# Patient Record
Sex: FEMALE | Race: BLACK OR AFRICAN AMERICAN | NOT HISPANIC OR LATINO | URBAN - METROPOLITAN AREA
[De-identification: names, ages, dates, MRNs, and addresses within clinical notes are randomized per-mention and may not be internally consistent; named-entity substitution may affect disease eponyms.]

---

## 2022-01-13 ENCOUNTER — EMERGENCY (EMERGENCY)
Facility: HOSPITAL | Age: 52
LOS: 1 days | Discharge: ANOTHER HOSPITAL | End: 2022-01-13
Attending: EMERGENCY MEDICINE | Admitting: EMERGENCY MEDICINE
Payer: COMMERCIAL

## 2022-01-13 ENCOUNTER — INPATIENT (INPATIENT)
Facility: HOSPITAL | Age: 52
LOS: 3 days | Discharge: HOME | DRG: 250 | End: 2022-01-17
Attending: HOSPITALIST | Admitting: INTERNAL MEDICINE
Payer: COMMERCIAL

## 2022-01-13 VITALS
HEART RATE: 89 BPM | WEIGHT: 259.93 LBS | DIASTOLIC BLOOD PRESSURE: 90 MMHG | SYSTOLIC BLOOD PRESSURE: 171 MMHG | TEMPERATURE: 98 F | HEIGHT: 64 IN | OXYGEN SATURATION: 99 % | RESPIRATION RATE: 16 BRPM

## 2022-01-13 VITALS
DIASTOLIC BLOOD PRESSURE: 110 MMHG | RESPIRATION RATE: 18 BRPM | SYSTOLIC BLOOD PRESSURE: 159 MMHG | TEMPERATURE: 98 F | HEART RATE: 87 BPM | OXYGEN SATURATION: 100 %

## 2022-01-13 VITALS
RESPIRATION RATE: 16 BRPM | DIASTOLIC BLOOD PRESSURE: 86 MMHG | HEIGHT: 66 IN | HEART RATE: 83 BPM | WEIGHT: 258.6 LBS | SYSTOLIC BLOOD PRESSURE: 138 MMHG | TEMPERATURE: 99 F

## 2022-01-13 DIAGNOSIS — I21.3 ST ELEVATION (STEMI) MYOCARDIAL INFARCTION OF UNSPECIFIED SITE: ICD-10-CM

## 2022-01-13 LAB
ALBUMIN SERPL ELPH-MCNC: 3.6 G/DL — SIGNIFICANT CHANGE UP (ref 3.3–5)
ALBUMIN SERPL ELPH-MCNC: 4.2 G/DL — SIGNIFICANT CHANGE UP (ref 3.3–5)
ALP SERPL-CCNC: 63 U/L — SIGNIFICANT CHANGE UP (ref 40–120)
ALP SERPL-CCNC: 64 U/L — SIGNIFICANT CHANGE UP (ref 40–120)
ALT FLD-CCNC: 25 U/L — SIGNIFICANT CHANGE UP (ref 12–78)
ALT FLD-CCNC: 42 U/L — SIGNIFICANT CHANGE UP (ref 10–45)
ANION GAP SERPL CALC-SCNC: 17 MMOL/L — SIGNIFICANT CHANGE UP (ref 5–17)
ANION GAP SERPL CALC-SCNC: 9 MMOL/L — SIGNIFICANT CHANGE UP (ref 5–17)
APTT BLD: 30.7 SEC — SIGNIFICANT CHANGE UP (ref 27.5–35.5)
APTT BLD: >200 SEC — CRITICAL HIGH (ref 27.5–35.5)
AST SERPL-CCNC: 220 U/L — HIGH (ref 10–40)
AST SERPL-CCNC: 34 U/L — SIGNIFICANT CHANGE UP (ref 15–37)
BASOPHILS # BLD AUTO: 0.02 K/UL — SIGNIFICANT CHANGE UP (ref 0–0.2)
BASOPHILS NFR BLD AUTO: 0.3 % — SIGNIFICANT CHANGE UP (ref 0–2)
BILIRUB SERPL-MCNC: 0.5 MG/DL — SIGNIFICANT CHANGE UP (ref 0.2–1.2)
BILIRUB SERPL-MCNC: 0.6 MG/DL — SIGNIFICANT CHANGE UP (ref 0.2–1.2)
BLD GP AB SCN SERPL QL: NEGATIVE — SIGNIFICANT CHANGE UP
BUN SERPL-MCNC: 6 MG/DL — LOW (ref 7–23)
BUN SERPL-MCNC: 6 MG/DL — LOW (ref 7–23)
CALCIUM SERPL-MCNC: 9.1 MG/DL — SIGNIFICANT CHANGE UP (ref 8.5–10.1)
CALCIUM SERPL-MCNC: 9.6 MG/DL — SIGNIFICANT CHANGE UP (ref 8.4–10.5)
CHLORIDE SERPL-SCNC: 105 MMOL/L — SIGNIFICANT CHANGE UP (ref 96–108)
CHLORIDE SERPL-SCNC: 99 MMOL/L — SIGNIFICANT CHANGE UP (ref 96–108)
CK MB BLD-MCNC: 8.8 % — HIGH (ref 0–3.5)
CK MB CFR SERPL CALC: 276.1 NG/ML — HIGH (ref 0–3.8)
CK SERPL-CCNC: 3140 U/L — HIGH (ref 25–170)
CO2 SERPL-SCNC: 20 MMOL/L — LOW (ref 22–31)
CO2 SERPL-SCNC: 26 MMOL/L — SIGNIFICANT CHANGE UP (ref 22–31)
CREAT SERPL-MCNC: 0.64 MG/DL — SIGNIFICANT CHANGE UP (ref 0.5–1.3)
CREAT SERPL-MCNC: 0.92 MG/DL — SIGNIFICANT CHANGE UP (ref 0.5–1.3)
EOSINOPHIL # BLD AUTO: 0 K/UL — SIGNIFICANT CHANGE UP (ref 0–0.5)
EOSINOPHIL NFR BLD AUTO: 0 % — SIGNIFICANT CHANGE UP (ref 0–6)
GLUCOSE SERPL-MCNC: 120 MG/DL — HIGH (ref 70–99)
GLUCOSE SERPL-MCNC: 132 MG/DL — HIGH (ref 70–99)
HCT VFR BLD CALC: 38.8 % — SIGNIFICANT CHANGE UP (ref 34.5–45)
HCT VFR BLD CALC: 39.9 % — SIGNIFICANT CHANGE UP (ref 34.5–45)
HGB BLD-MCNC: 12.3 G/DL — SIGNIFICANT CHANGE UP (ref 11.5–15.5)
HGB BLD-MCNC: 12.3 G/DL — SIGNIFICANT CHANGE UP (ref 11.5–15.5)
IMM GRANULOCYTES NFR BLD AUTO: 0.6 % — SIGNIFICANT CHANGE UP (ref 0–1.5)
INR BLD: 1.12 RATIO — SIGNIFICANT CHANGE UP (ref 0.88–1.16)
LYMPHOCYTES # BLD AUTO: 1.66 K/UL — SIGNIFICANT CHANGE UP (ref 1–3.3)
LYMPHOCYTES # BLD AUTO: 24.3 % — SIGNIFICANT CHANGE UP (ref 13–44)
MAGNESIUM SERPL-MCNC: 1.6 MG/DL — SIGNIFICANT CHANGE UP (ref 1.6–2.6)
MCHC RBC-ENTMCNC: 26.5 PG — LOW (ref 27–34)
MCHC RBC-ENTMCNC: 26.9 PG — LOW (ref 27–34)
MCHC RBC-ENTMCNC: 30.8 GM/DL — LOW (ref 32–36)
MCHC RBC-ENTMCNC: 31.7 GM/DL — LOW (ref 32–36)
MCV RBC AUTO: 84.9 FL — SIGNIFICANT CHANGE UP (ref 80–100)
MCV RBC AUTO: 86 FL — SIGNIFICANT CHANGE UP (ref 80–100)
MONOCYTES # BLD AUTO: 0.39 K/UL — SIGNIFICANT CHANGE UP (ref 0–0.9)
MONOCYTES NFR BLD AUTO: 5.7 % — SIGNIFICANT CHANGE UP (ref 2–14)
NEUTROPHILS # BLD AUTO: 4.73 K/UL — SIGNIFICANT CHANGE UP (ref 1.8–7.4)
NEUTROPHILS NFR BLD AUTO: 69.1 % — SIGNIFICANT CHANGE UP (ref 43–77)
NRBC # BLD: 0 /100 WBCS — SIGNIFICANT CHANGE UP (ref 0–0)
NRBC # BLD: 0 /100 WBCS — SIGNIFICANT CHANGE UP (ref 0–0)
PHOSPHATE SERPL-MCNC: 3.1 MG/DL — SIGNIFICANT CHANGE UP (ref 2.5–4.5)
PLATELET # BLD AUTO: 327 K/UL — SIGNIFICANT CHANGE UP (ref 150–400)
PLATELET # BLD AUTO: 362 K/UL — SIGNIFICANT CHANGE UP (ref 150–400)
POTASSIUM SERPL-MCNC: 3.7 MMOL/L — SIGNIFICANT CHANGE UP (ref 3.5–5.3)
POTASSIUM SERPL-MCNC: 3.9 MMOL/L — SIGNIFICANT CHANGE UP (ref 3.5–5.3)
POTASSIUM SERPL-SCNC: 3.7 MMOL/L — SIGNIFICANT CHANGE UP (ref 3.5–5.3)
POTASSIUM SERPL-SCNC: 3.9 MMOL/L — SIGNIFICANT CHANGE UP (ref 3.5–5.3)
PROT SERPL-MCNC: 8 G/DL — SIGNIFICANT CHANGE UP (ref 6–8.3)
PROT SERPL-MCNC: 8.6 G/DL — HIGH (ref 6–8.3)
PROTHROM AB SERPL-ACNC: 13 SEC — SIGNIFICANT CHANGE UP (ref 10.6–13.6)
RBC # BLD: 4.57 M/UL — SIGNIFICANT CHANGE UP (ref 3.8–5.2)
RBC # BLD: 4.64 M/UL — SIGNIFICANT CHANGE UP (ref 3.8–5.2)
RBC # FLD: 14.7 % — HIGH (ref 10.3–14.5)
RBC # FLD: 14.9 % — HIGH (ref 10.3–14.5)
RH IG SCN BLD-IMP: POSITIVE — SIGNIFICANT CHANGE UP
SARS-COV-2 RNA SPEC QL NAA+PROBE: DETECTED
SODIUM SERPL-SCNC: 136 MMOL/L — SIGNIFICANT CHANGE UP (ref 135–145)
SODIUM SERPL-SCNC: 140 MMOL/L — SIGNIFICANT CHANGE UP (ref 135–145)
TROPONIN I, HIGH SENSITIVITY RESULT: 1590.4 NG/L — HIGH
TROPONIN T, HIGH SENSITIVITY RESULT: 3085 NG/L — HIGH (ref 0–51)
WBC # BLD: 11.42 K/UL — HIGH (ref 3.8–10.5)
WBC # BLD: 6.84 K/UL — SIGNIFICANT CHANGE UP (ref 3.8–10.5)
WBC # FLD AUTO: 11.42 K/UL — HIGH (ref 3.8–10.5)
WBC # FLD AUTO: 6.84 K/UL — SIGNIFICANT CHANGE UP (ref 3.8–10.5)

## 2022-01-13 PROCEDURE — 87635 SARS-COV-2 COVID-19 AMP PRB: CPT

## 2022-01-13 PROCEDURE — 71045 X-RAY EXAM CHEST 1 VIEW: CPT | Mod: 26

## 2022-01-13 PROCEDURE — 99222 1ST HOSP IP/OBS MODERATE 55: CPT

## 2022-01-13 PROCEDURE — 85610 PROTHROMBIN TIME: CPT

## 2022-01-13 PROCEDURE — 99291 CRITICAL CARE FIRST HOUR: CPT

## 2022-01-13 PROCEDURE — 84484 ASSAY OF TROPONIN QUANT: CPT

## 2022-01-13 PROCEDURE — 92941 PRQ TRLML REVSC TOT OCCL AMI: CPT | Mod: LC

## 2022-01-13 PROCEDURE — 82553 CREATINE MB FRACTION: CPT

## 2022-01-13 PROCEDURE — 99285 EMERGENCY DEPT VISIT HI MDM: CPT

## 2022-01-13 PROCEDURE — 99152 MOD SED SAME PHYS/QHP 5/>YRS: CPT

## 2022-01-13 PROCEDURE — 93454 CORONARY ARTERY ANGIO S&I: CPT | Mod: 26,59

## 2022-01-13 PROCEDURE — 71045 X-RAY EXAM CHEST 1 VIEW: CPT

## 2022-01-13 PROCEDURE — 85730 THROMBOPLASTIN TIME PARTIAL: CPT

## 2022-01-13 PROCEDURE — 36415 COLL VENOUS BLD VENIPUNCTURE: CPT

## 2022-01-13 PROCEDURE — 93010 ELECTROCARDIOGRAM REPORT: CPT

## 2022-01-13 PROCEDURE — 93005 ELECTROCARDIOGRAM TRACING: CPT

## 2022-01-13 PROCEDURE — 96374 THER/PROPH/DIAG INJ IV PUSH: CPT

## 2022-01-13 PROCEDURE — 96375 TX/PRO/DX INJ NEW DRUG ADDON: CPT

## 2022-01-13 PROCEDURE — 99285 EMERGENCY DEPT VISIT HI MDM: CPT | Mod: 25

## 2022-01-13 PROCEDURE — 85025 COMPLETE CBC W/AUTO DIFF WBC: CPT

## 2022-01-13 PROCEDURE — 82550 ASSAY OF CK (CPK): CPT

## 2022-01-13 PROCEDURE — 80053 COMPREHEN METABOLIC PANEL: CPT

## 2022-01-13 RX ORDER — HEPARIN SODIUM 5000 [USP'U]/ML
6000 INJECTION INTRAVENOUS; SUBCUTANEOUS EVERY 6 HOURS
Refills: 0 | Status: DISCONTINUED | OUTPATIENT
Start: 2022-01-13 | End: 2022-01-16

## 2022-01-13 RX ORDER — TICAGRELOR 90 MG/1
90 TABLET ORAL EVERY 12 HOURS
Refills: 0 | Status: DISCONTINUED | OUTPATIENT
Start: 2022-01-14 | End: 2022-01-14

## 2022-01-13 RX ORDER — INFLUENZA VIRUS VACCINE 15; 15; 15; 15 UG/.5ML; UG/.5ML; UG/.5ML; UG/.5ML
0.5 SUSPENSION INTRAMUSCULAR ONCE
Refills: 0 | Status: DISCONTINUED | OUTPATIENT
Start: 2022-01-13 | End: 2022-01-17

## 2022-01-13 RX ORDER — MAGNESIUM SULFATE 500 MG/ML
2 VIAL (ML) INJECTION ONCE
Refills: 0 | Status: COMPLETED | OUTPATIENT
Start: 2022-01-13 | End: 2022-01-13

## 2022-01-13 RX ORDER — NITROGLYCERIN 6.5 MG
0.4 CAPSULE, EXTENDED RELEASE ORAL ONCE
Refills: 0 | Status: COMPLETED | OUTPATIENT
Start: 2022-01-13 | End: 2022-01-13

## 2022-01-13 RX ORDER — EPTIFIBATIDE 2 MG/ML
2.02 INJECTION, SOLUTION INTRAVENOUS
Qty: 75 | Refills: 0 | Status: DISCONTINUED | OUTPATIENT
Start: 2022-01-13 | End: 2022-01-14

## 2022-01-13 RX ORDER — ATORVASTATIN CALCIUM 80 MG/1
80 TABLET, FILM COATED ORAL AT BEDTIME
Refills: 0 | Status: DISCONTINUED | OUTPATIENT
Start: 2022-01-13 | End: 2022-01-14

## 2022-01-13 RX ORDER — ASPIRIN/CALCIUM CARB/MAGNESIUM 324 MG
81 TABLET ORAL ONCE
Refills: 0 | Status: COMPLETED | OUTPATIENT
Start: 2022-01-13 | End: 2022-01-13

## 2022-01-13 RX ORDER — POTASSIUM CHLORIDE 20 MEQ
10 PACKET (EA) ORAL ONCE
Refills: 0 | Status: DISCONTINUED | OUTPATIENT
Start: 2022-01-13 | End: 2022-01-13

## 2022-01-13 RX ORDER — HEPARIN SODIUM 5000 [USP'U]/ML
5000 INJECTION INTRAVENOUS; SUBCUTANEOUS ONCE
Refills: 0 | Status: COMPLETED | OUTPATIENT
Start: 2022-01-13 | End: 2022-01-13

## 2022-01-13 RX ORDER — CHLORHEXIDINE GLUCONATE 213 G/1000ML
1 SOLUTION TOPICAL
Refills: 0 | Status: DISCONTINUED | OUTPATIENT
Start: 2022-01-13 | End: 2022-01-16

## 2022-01-13 RX ORDER — LEVOTHYROXINE SODIUM 125 MCG
150 TABLET ORAL DAILY
Refills: 0 | Status: DISCONTINUED | OUTPATIENT
Start: 2022-01-13 | End: 2022-01-17

## 2022-01-13 RX ORDER — LEVOTHYROXINE SODIUM 125 MCG
150 TABLET ORAL DAILY
Refills: 0 | Status: DISCONTINUED | OUTPATIENT
Start: 2022-01-13 | End: 2022-01-16

## 2022-01-13 RX ORDER — ONDANSETRON 8 MG/1
4 TABLET, FILM COATED ORAL ONCE
Refills: 0 | Status: COMPLETED | OUTPATIENT
Start: 2022-01-13 | End: 2022-01-13

## 2022-01-13 RX ORDER — HEPARIN SODIUM 5000 [USP'U]/ML
5000 INJECTION INTRAVENOUS; SUBCUTANEOUS EVERY 8 HOURS
Refills: 0 | Status: DISCONTINUED | OUTPATIENT
Start: 2022-01-14 | End: 2022-01-17

## 2022-01-13 RX ORDER — TICAGRELOR 90 MG/1
180 TABLET ORAL ONCE
Refills: 0 | Status: COMPLETED | OUTPATIENT
Start: 2022-01-13 | End: 2022-01-13

## 2022-01-13 RX ORDER — MORPHINE SULFATE 50 MG/1
2 CAPSULE, EXTENDED RELEASE ORAL ONCE
Refills: 0 | Status: DISCONTINUED | OUTPATIENT
Start: 2022-01-13 | End: 2022-01-13

## 2022-01-13 RX ORDER — LIDOCAINE 4 G/100G
1 CREAM TOPICAL EVERY 24 HOURS
Refills: 0 | Status: DISCONTINUED | OUTPATIENT
Start: 2022-01-13 | End: 2022-01-17

## 2022-01-13 RX ORDER — ASPIRIN/CALCIUM CARB/MAGNESIUM 324 MG
81 TABLET ORAL DAILY
Refills: 0 | Status: DISCONTINUED | OUTPATIENT
Start: 2022-01-14 | End: 2022-01-14

## 2022-01-13 RX ORDER — ONDANSETRON 8 MG/1
4 TABLET, FILM COATED ORAL ONCE
Refills: 0 | Status: DISCONTINUED | OUTPATIENT
Start: 2022-01-13 | End: 2022-01-13

## 2022-01-13 RX ORDER — POTASSIUM CHLORIDE 20 MEQ
40 PACKET (EA) ORAL ONCE
Refills: 0 | Status: DISCONTINUED | OUTPATIENT
Start: 2022-01-13 | End: 2022-01-13

## 2022-01-13 RX ORDER — HEPARIN SODIUM 5000 [USP'U]/ML
INJECTION INTRAVENOUS; SUBCUTANEOUS
Qty: 25000 | Refills: 0 | Status: DISCONTINUED | OUTPATIENT
Start: 2022-01-13 | End: 2022-01-16

## 2022-01-13 RX ORDER — POTASSIUM CHLORIDE 20 MEQ
20 PACKET (EA) ORAL ONCE
Refills: 0 | Status: COMPLETED | OUTPATIENT
Start: 2022-01-13 | End: 2022-01-13

## 2022-01-13 RX ADMIN — Medication 25 GRAM(S): at 18:33

## 2022-01-13 RX ADMIN — EPTIFIBATIDE 19 MICROGRAM(S)/KG/MIN: 2 INJECTION, SOLUTION INTRAVENOUS at 21:34

## 2022-01-13 RX ADMIN — TICAGRELOR 180 MILLIGRAM(S): 90 TABLET ORAL at 13:28

## 2022-01-13 RX ADMIN — Medication 0.4 MILLIGRAM(S): at 11:53

## 2022-01-13 RX ADMIN — MORPHINE SULFATE 2 MILLIGRAM(S): 50 CAPSULE, EXTENDED RELEASE ORAL at 13:27

## 2022-01-13 RX ADMIN — MORPHINE SULFATE 2 MILLIGRAM(S): 50 CAPSULE, EXTENDED RELEASE ORAL at 13:42

## 2022-01-13 RX ADMIN — Medication 150 MICROGRAM(S): at 23:19

## 2022-01-13 RX ADMIN — Medication 81 MILLIGRAM(S): at 11:53

## 2022-01-13 RX ADMIN — ONDANSETRON 4 MILLIGRAM(S): 8 TABLET, FILM COATED ORAL at 17:15

## 2022-01-13 RX ADMIN — EPTIFIBATIDE 19 MICROGRAM(S)/KG/MIN: 2 INJECTION, SOLUTION INTRAVENOUS at 17:16

## 2022-01-13 RX ADMIN — HEPARIN SODIUM 5000 UNIT(S): 5000 INJECTION INTRAVENOUS; SUBCUTANEOUS at 13:27

## 2022-01-13 RX ADMIN — LIDOCAINE 1 PATCH: 4 CREAM TOPICAL at 23:46

## 2022-01-13 RX ADMIN — Medication 20 MILLIEQUIVALENT(S): at 23:19

## 2022-01-13 RX ADMIN — HEPARIN SODIUM 1000 UNIT(S)/HR: 5000 INJECTION INTRAVENOUS; SUBCUTANEOUS at 13:28

## 2022-01-13 RX ADMIN — ATORVASTATIN CALCIUM 80 MILLIGRAM(S): 80 TABLET, FILM COATED ORAL at 21:35

## 2022-01-13 NOTE — ED ADULT NURSE NOTE - OBJECTIVE STATEMENT
Pt. received alert and oriented x3 with chief complaint of sudden onset of chest pain when waking up this morning w/ diaphoresis. Pt. placed on continuous cardiac monitor with continuous pulse ox. EKG performed at bedside upon arrival.

## 2022-01-13 NOTE — H&P ADULT - NSHPLABSRESULTS_GEN_ALL_CORE
LABS:      The Labs were reviewed by me   The Radiology was reviewed by me    EKG tracing reviewed by me    01-13    140  |  105  |  6<L>  ----------------------------<  120<H>  3.9   |  26  |  0.92    Ca    9.1      13 Jan 2022 12:06    TPro  8.6<H>  /  Alb  3.6  /  TBili  0.5  /  DBili  x   /  AST  34  /  ALT  25  /  AlkPhos  63  01-13          PT/INR - ( 13 Jan 2022 13:12 )   PT: 13.0 sec;   INR: 1.12 ratio         PTT - ( 13 Jan 2022 13:12 )  PTT:30.7 sec                                        12.3   6.84  )-----------( 327      ( 13 Jan 2022 12:06 )             39.9     CAPILLARY BLOOD GLUCOSE            IMAGING: LABS:      The Labs were reviewed by me   The Radiology was reviewed by me    EKG tracing reviewed by me    01-13    140  |  105  |  6<L>  ----------------------------<  120<H>  3.9   |  26  |  0.92    Ca    9.1      13 Jan 2022 12:06    TPro  8.6<H>  /  Alb  3.6  /  TBili  0.5  /  DBili  x   /  AST  34  /  ALT  25  /  AlkPhos  63  01-13          PT/INR - ( 13 Jan 2022 13:12 )   PT: 13.0 sec;   INR: 1.12 ratio         PTT - ( 13 Jan 2022 13:12 )  PTT:30.7 sec                                        12.3   6.84  )-----------( 327      ( 13 Jan 2022 12:06 )             39.9     CAPILLARY BLOOD GLUCOSE

## 2022-01-13 NOTE — CONSULT NOTE ADULT - ATTENDING COMMENTS
Reviewed    Patient seen and examined    Agree with plan as outlined above    51 year old female with PMH of hypothyroidism presents today with chest discomfort. Cardio consulted. Her EKG is showing Sinus rhythm with fusion complexes, ST depressions in V1-V3, TWI in I and AVl and her 1st set of troponin was elevated to 1500. She was also incidentally covid positive.  No evidence of volume overload on exam. Her BP is elevated here which is likely acute on chronic. Her EKG and positive CE are suggestive of NSTEMI.    Recommendations    - STAT ASA, brillinta, heparin gtt  - consider 2mg IVP morphine for chest discomfort  - Repeat EKG now  - Repeat full set CE now and trend q4 hrs  - For transfer to [facility] for cardiac cath  - Conservative management for COVID. Consider ID consult  - monitor and replete lytes, keep K>4, Mg>2  - Other cardiovascular workup will depend on clinical course.  - All other workup per primary team  - Will follow

## 2022-01-13 NOTE — H&P ADULT - NSHPPHYSICALEXAM_GEN_ALL_CORE
T(C): 36.7 (01-13-22 @ 13:59), Max: 36.7 (01-13-22 @ 11:17)  HR: 87 (01-13-22 @ 13:59) (72 - 89)  BP: 159/110 (01-13-22 @ 13:59) (159/110 - 171/90)  RR: 18 (01-13-22 @ 13:59) (16 - 18)  SpO2: 100% (01-13-22 @ 13:59) (97% - 100%)  Wt(kg): --Vital Signs Last 24 Hrs  T(C): 36.7 (13 Jan 2022 13:59), Max: 36.7 (13 Jan 2022 11:17)  T(F): 98 (13 Jan 2022 13:59), Max: 98 (13 Jan 2022 11:17)  HR: 87 (13 Jan 2022 13:59) (72 - 89)  BP: 159/110 (13 Jan 2022 13:59) (159/110 - 171/90)  BP(mean): --  RR: 18 (13 Jan 2022 13:59) (16 - 18)  SpO2: 100% (13 Jan 2022 13:59) (97% - 100%)    PHYSICAL EXAM:  Constitutional: NAD, obese  HEENT NC/AT, moist mucous membranes  Pulmonary: Non-labored, breath sounds are clear bilaterally, no wheezing, rales or rhonchi  Cardiovascular: +S1, S2, RRR, no murmur  Gastrointestinal: Soft, nontender, nondistended, normoactive bowel sounds  Extremities: No peripheral edema   Neurological: Alert, strength and sensitivity are grossly intact  Skin: No obvious lesions/rashes  Psych: Mood & affect appropriate    Consultant(s) Notes Reviewed:  [x ] YES  [ ] NO  Care Discussed with Consultants/Other Providers [ x] YES  [ ] NO

## 2022-01-13 NOTE — H&P ADULT - ATTENDING COMMENTS
50 yo woman with HTN not on meds, hypothyroidism, fibroids, who presented with ACS to OSH s/p PCI to the     Continue Integrilin drip, check CBC for plts   DAPT  check TTE  trend CE   check TSH, lipids and HbA1C

## 2022-01-13 NOTE — PROGRESS NOTE ADULT - SUBJECTIVE AND OBJECTIVE BOX
YU RED  MRN-[id number]  Patient is a 51y old  Female who presents with a chief complaint of NSTEMI (13 Jan 2022 15:11)    HPI:  HPI: 51 year old female with PMH of hypothyroidism presents today with chest discomfort, which started at 7:30am. This morning patient woke up feeling very diaphoretic with burning sensation in throat. She then developed substernal chest pressure, 5/10 chest pain, and nausea. Denies dyspnea, PND, orthopnea, lower extremity swelling. She was brought to ED for evaluation. Prior to today. She has never had chest pain. Never had ischemic workup. She recently established care with Cardiologist Dr. Amador Gupta for HTN. She is a former smoker of 15 years.     ED Course:   VS: Afebrile, /90, HR 89, RR 16, SpO2 99% on RA  Labs: trop I 1590, , CKMB 22, COVID+, CXR possible interstitial infiltrates  hep gtt, ticagrelor 180 mg x 1   (13 Jan 2022 15:11)      Hospital Course:  1/13 Transferred to CCU for further management     24 HOUR EVENTS:    REVIEW OF SYSTEMS:  CONSTITUTIONAL: No fever, weight loss, or fatigue, (+) diaphoresis  EYES: No eye pain, visual disturbances, or discharge  ENT:  No difficulty hearing, tinnitus, vertigo; No sinus or throat pain  NECK: No pain or stiffness  BREASTS: No pain, masses, or nipple discharge  RESPIRATORY: No cough, wheezing, chills or hemoptysis; No shortness of breath  CARDIOVASCULAR: (+) chest pain. No palpitations, dizziness, or leg swelling  GASTROINTESTINAL: No abdominal or epigastric pain. (+) nausea. No vomiting, or hematemesis; No diarrhea or constipation. No melena or hematochezia.  GENITOURINARY: No dysuria, frequency, hematuria, or incontinence  NEUROLOGICAL: No headaches, memory loss, loss of strength, numbness, or tremors  SKIN: No itching, burning, rashes, or lesions   LYMPH NODES: No enlarged glands  ENDOCRINE: No heat or cold intolerance; No hair loss  MUSCULOSKELETAL: No joint pain or swelling; No muscle, back, or extremity pain  PSYCHIATRIC: No depression, anxiety, mood swings, or difficulty sleeping  HEME/LYMPH: No easy bruising, or bleeding gums  ALLERGY AND IMMUNOLOGIC: No hives or eczema      ICU Vital Signs Last 24 Hrs  T(C): 37.1 (13 Jan 2022 16:30), Max: 37.1 (13 Jan 2022 16:30)  T(F): 98.7 (13 Jan 2022 16:30), Max: 98.7 (13 Jan 2022 16:30)  HR: 75 (13 Jan 2022 18:30) (72 - 97)  BP: 100/65 (13 Jan 2022 18:30) (100/64 - 171/90)  BP(mean): 78 (13 Jan 2022 18:30) (74 - 106)  ABP: --  ABP(mean): --  RR: 20 (13 Jan 2022 18:30) (15 - 23)  SpO2: 100% (13 Jan 2022 18:30) (97% - 100%)      CVP(mm Hg): --  CO: --  CI: --  PA: --  PA(mean): --  PA(direct): --  PCWP: --  LA: --  RA: --  SVR: --  SVRI: --  PVR: --  PVRI: --  I&O's Summary    13 Jan 2022 07:01  -  13 Jan 2022 19:49  --------------------------------------------------------  IN: 366 mL / OUT: 400 mL / NET: -34 mL        CAPILLARY BLOOD GLUCOSE    CAPILLARY BLOOD GLUCOSE          PHYSICAL EXAM:  Constitutional: NAD, obese  HEENT NC/AT, moist mucous membranes  Pulmonary: Non-labored, breath sounds are clear bilaterally, no wheezing, rales or rhonchi  Cardiovascular: +S1, S2, RRR, no murmur  Gastrointestinal: Soft, nontender, nondistended, normoactive bowel sounds  Extremities: No peripheral edema   Neurological: Alert, strength and sensitivity are grossly intact  Skin: No obvious lesions/rashes  Psych: Mood & affect appropriate      ============================I/O===========================   I&O's Detail    13 Jan 2022 07:01  -  13 Jan 2022 19:49  --------------------------------------------------------  IN:    Eptifbatide: 76 mL    IV PiggyBack: 50 mL    Oral Fluid: 240 mL  Total IN: 366 mL    OUT:    Voided (mL): 400 mL  Total OUT: 400 mL    Total NET: -34 mL        ============================ LABS =========================                        12.3   11.42 )-----------( 362      ( 13 Jan 2022 17:11 )             38.8     01-13    136  |  99  |  6<L>  ----------------------------<  132<H>  3.7   |  20<L>  |  0.64    Ca    9.6      13 Jan 2022 17:11  Phos  3.1     01-13  Mg     1.6     01-13    TPro  8.0  /  Alb  4.2  /  TBili  0.6  /  DBili  x   /  AST  220<H>  /  ALT  42  /  AlkPhos  64  01-13    Troponin T, High Sensitivity Result: 3085 ng/L (01-13-22 @ 17:11)    CKMB Units: 276.1 ng/mL (01-13-22 @ 17:11)  CKMB Units: 22.0 ng/mL (01-13-22 @ 12:06)    Creatine Kinase, Serum: 3140 U/L (01-13-22 @ 17:11)  Creatine Kinase, Serum: 283 U/L (01-13-22 @ 12:06)    CPK Mass Assay %: 8.8 % (01-13-22 @ 17:11)  CPK Mass Assay %: 7.8 % (01-13-22 @ 12:06)        LIVER FUNCTIONS - ( 13 Jan 2022 17:11 )  Alb: 4.2 g/dL / Pro: 8.0 g/dL / ALK PHOS: 64 U/L / ALT: 42 U/L / AST: 220 U/L / GGT: x           PT/INR - ( 13 Jan 2022 13:12 )   PT: 13.0 sec;   INR: 1.12 ratio         PTT - ( 13 Jan 2022 17:11 )  PTT:>200.0 sec        ======================Micro/Rad/Cardio=================  Telemtry: Reviewed   EKG: Reviewed  CXR: Reviewed  Culture: Reviewed   Echo:   Cath:   ======================================================  PAST MEDICAL & SURGICAL HISTORY:  Hypothyroidism    No significant past surgical history      ====================ASSESSMENT ==============  NSTEMI  COVID-19  Hypothyroid           Plan:  ====================== NEUROLOGY=====================  - A&Ox3, no active issues      ==================== RESPIRATORY======================  Covid-19  - Satting well on RA, no active issues at this time       ====================CARDIOVASCULAR==================  NSTEMI  -s/p aspirin 81 mg, brillinta 180 mg, integralin gtt x18 hours post-cath  - s/p morphine 2 mg IVP x 1 for chest pain; pain/nausea control PRN  - Repeat EKG  - c/w lipitor   - Trend cardiac enzymes Q6h to peak  - Cardiac cath (1/13/22) s/p balloon angioplasty to OM1 and placed on integrilin drip with plans to return to cath lab to reassess clot burden tomorrow         - considering SCAD vs microvascular disease vs CAD  - Monitor and replete lytes, keep K>4 and Mg >2  - f/u lipid panel and A1c  - f/u TTE      atorvastatin 80 milliGRAM(s) Oral at bedtime    ===================HEMATOLOGIC/ONC ===================  - Trend H/H  - A/C: integralin gtt    eptifibatide Infusion 75 mg/100 mL 2.025 MICROgram(s)/kG/Min (19 mL/Hr) IV Continuous <Continuous>    ===================== RENAL =========================  - Cr 0.92  - trend BMP  -Continue monitoring urine output    ==================== GASTROINTESTINAL===================  - Tolerating PO DASH/TLC diet.     potassium chloride    Tablet ER 40 milliEquivalent(s) Oral once    =======================    ENDOCRINE  =====================  - f/u HbA1C, Lipids  - monitor glucose  - Hypothyroidism: patient takes levothyroxine 150mcg at home (skipped day of admission)    levothyroxine 150 MICROGram(s) Oral daily    ========================INFECTIOUS DISEASE================  COVID-19  - on RA; infection discovered incidentally  - holding on treatment with dexamethasone and remdesivir  - Stable WBC, afebrile  - observe off abx    Patient requires continuous monitoring with bedside rhythm monitoring, pulse ox monitoring, and intermittent blood gas analysis. Care plan discussed with ICU care team. Patient remained critical and at risk for life threatening decompensation.  Patient seen, examined and plan discussed with CCU team during rounds.     I have personally provided ____ minutes of critical care time excluding time spent on separate procedures, in addition to initial critical care time provided by the CICU Attending, Dr. Laguna.     By signing my name below, I, Yris Chavarria, attest that this documentation has been prepared under the direction and in the presence of THONY Mauricio  Electronically signed: David Sloan, 01-13-22 @ 19:49    I, THONY Mauricio, personally performed the services described in this documentation. all medical record entries made by the scribe were at my direction and in my presence. I have reviewed the chart and agree that the record reflects my personal performance and is accurate and complete  Electronically signed: THONY Mauricio        YU RED  MRN-[id number]  Patient is a 51y old  Female who presents with a chief complaint of NSTEMI (13 Jan 2022 15:11)    HPI:  HPI: 51 year old female with PMH of hypothyroidism presents today with chest discomfort, which started at 7:30am. This morning patient woke up feeling very diaphoretic with burning sensation in throat. She then developed substernal chest pressure, 5/10 chest pain, and nausea. Denies dyspnea, PND, orthopnea, lower extremity swelling. She was brought to ED for evaluation. Prior to today. She has never had chest pain. Never had ischemic workup. She recently established care with Cardiologist Dr. Amador Gupta for HTN. She is a former smoker of 15 years.     ED Course:   VS: Afebrile, /90, HR 89, RR 16, SpO2 99% on RA  Labs: trop I 1590, , CKMB 22, COVID+, CXR possible interstitial infiltrates  hep gtt, ticagrelor 180 mg x 1   (13 Jan 2022 15:11)      Hospital Course:  1/13 Transferred to CCU for further management s/p LHC w/ POBA to OM1     24 HOUR EVENTS:    REVIEW OF SYSTEMS:  CONSTITUTIONAL: No fever, weight loss, or fatigue, (+) diaphoresis  EYES: No eye pain, visual disturbances, or discharge  ENT:  No difficulty hearing, tinnitus, vertigo; No sinus or throat pain  NECK: No pain or stiffness  BREASTS: No pain, masses, or nipple discharge  RESPIRATORY: No cough, wheezing, chills or hemoptysis; No shortness of breath  CARDIOVASCULAR: (+) chest pain. No palpitations, dizziness, or leg swelling  GASTROINTESTINAL: No abdominal or epigastric pain. (+) nausea. No vomiting, or hematemesis; No diarrhea or constipation. No melena or hematochezia.  GENITOURINARY: No dysuria, frequency, hematuria, or incontinence  NEUROLOGICAL: No headaches, memory loss, loss of strength, numbness, or tremors  SKIN: No itching, burning, rashes, or lesions   LYMPH NODES: No enlarged glands  ENDOCRINE: No heat or cold intolerance; No hair loss  MUSCULOSKELETAL: No joint pain or swelling; No muscle, back, or extremity pain  PSYCHIATRIC: No depression, anxiety, mood swings, or difficulty sleeping  HEME/LYMPH: No easy bruising, or bleeding gums  ALLERGY AND IMMUNOLOGIC: No hives or eczema      ICU Vital Signs Last 24 Hrs  T(C): 37.1 (13 Jan 2022 16:30), Max: 37.1 (13 Jan 2022 16:30)  T(F): 98.7 (13 Jan 2022 16:30), Max: 98.7 (13 Jan 2022 16:30)  HR: 75 (13 Jan 2022 18:30) (72 - 97)  BP: 100/65 (13 Jan 2022 18:30) (100/64 - 171/90)  BP(mean): 78 (13 Jan 2022 18:30) (74 - 106)  ABP: --  ABP(mean): --  RR: 20 (13 Jan 2022 18:30) (15 - 23)  SpO2: 100% (13 Jan 2022 18:30) (97% - 100%)      CVP(mm Hg): --  CO: --  CI: --  PA: --  PA(mean): --  PA(direct): --  PCWP: --  LA: --  RA: --  SVR: --  SVRI: --  PVR: --  PVRI: --  I&O's Summary    13 Jan 2022 07:01  -  13 Jan 2022 19:49  --------------------------------------------------------  IN: 366 mL / OUT: 400 mL / NET: -34 mL        CAPILLARY BLOOD GLUCOSE    CAPILLARY BLOOD GLUCOSE          PHYSICAL EXAM:  Constitutional: NAD, obese  HEENT NC/AT, moist mucous membranes  Pulmonary: Non-labored, breath sounds are clear bilaterally, no wheezing, rales or rhonchi  Cardiovascular: +S1, S2, RRR, no murmur  Gastrointestinal: Soft, nontender, nondistended, normoactive bowel sounds  Extremities: No peripheral edema. L anterior/lateral wrist w/ hematoma. compressible no paresthesias, normal cap refill   Neurological: Alert, strength and sensitivity are grossly intact  Skin: No obvious lesions/rashes  Psych: Mood & affect appropriate      ============================I/O===========================   I&O's Detail    13 Jan 2022 07:01  -  13 Jan 2022 19:49  --------------------------------------------------------  IN:    Eptifbatide: 76 mL    IV PiggyBack: 50 mL    Oral Fluid: 240 mL  Total IN: 366 mL    OUT:    Voided (mL): 400 mL  Total OUT: 400 mL    Total NET: -34 mL        ============================ LABS =========================                        12.3   11.42 )-----------( 362      ( 13 Jan 2022 17:11 )             38.8     01-13    136  |  99  |  6<L>  ----------------------------<  132<H>  3.7   |  20<L>  |  0.64    Ca    9.6      13 Jan 2022 17:11  Phos  3.1     01-13  Mg     1.6     01-13    TPro  8.0  /  Alb  4.2  /  TBili  0.6  /  DBili  x   /  AST  220<H>  /  ALT  42  /  AlkPhos  64  01-13    Troponin T, High Sensitivity Result: 3085 ng/L (01-13-22 @ 17:11)    CKMB Units: 276.1 ng/mL (01-13-22 @ 17:11)  CKMB Units: 22.0 ng/mL (01-13-22 @ 12:06)    Creatine Kinase, Serum: 3140 U/L (01-13-22 @ 17:11)  Creatine Kinase, Serum: 283 U/L (01-13-22 @ 12:06)    CPK Mass Assay %: 8.8 % (01-13-22 @ 17:11)  CPK Mass Assay %: 7.8 % (01-13-22 @ 12:06)        LIVER FUNCTIONS - ( 13 Jan 2022 17:11 )  Alb: 4.2 g/dL / Pro: 8.0 g/dL / ALK PHOS: 64 U/L / ALT: 42 U/L / AST: 220 U/L / GGT: x           PT/INR - ( 13 Jan 2022 13:12 )   PT: 13.0 sec;   INR: 1.12 ratio         PTT - ( 13 Jan 2022 17:11 )  PTT:>200.0 sec        ======================Micro/Rad/Cardio=================  Telemtry: Reviewed   EKG: Reviewed  CXR: Reviewed  Culture: Reviewed   Echo:   Cath:   ======================================================  PAST MEDICAL & SURGICAL HISTORY:  Hypothyroidism    No significant past surgical history      ====================ASSESSMENT ==============  NSTEMI  COVID-19  Hypothyroid           Plan:  ====================== NEUROLOGY=====================  - A&Ox3, no active issues      ==================== RESPIRATORY======================  Covid-19  - Satting well on RA, no active issues at this time       ====================CARDIOVASCULAR==================  NSTEMI  -s/p aspirin 81 mg, brillinta 180 mg, integralin gtt x18 hours post-cath (d/c 10am 1/14)   - s/p morphine 2 mg IVP x 1 for chest pain; pain/nausea control PRN  - c/w ASA/Brilinta for DAPT   - c/w lipitor 80 high intenstiy statin   - Trend cardiac enzymes to peak  - Cardiac cath (1/13/22) s/p balloon angioplasty to OM1 and placed on integrilin drip with plans to return to cath lab to reassess clot burden tomorrow         - considering SCAD vs microvascular disease vs CAD  - GDMT as tolerated   - f/u lipid panel and A1c  - f/u TTE      atorvastatin 80 milliGRAM(s) Oral at bedtime    ===================HEMATOLOGIC/ONC ===================  -CBC Stable     - HSQ in AM for VTE ppx     eptifibatide Infusion 75 mg/100 mL 2.025 MICROgram(s)/kG/Min (19 mL/Hr) IV Continuous <Continuous>    ===================== RENAL =========================  - Cr 0.92  - trend BMP  -Continue monitoring urine output    ==================== GASTROINTESTINAL===================  - Tolerating PO DASH/TLC diet.     potassium chloride    Tablet ER 40 milliEquivalent(s) Oral once    =======================    ENDOCRINE  =====================  Hypothyroidism  - home does Synthroid 150 mcg daily, will continue  - f/u TSH      - f/u HbA1C, Lipids  - monitor glucose    levothyroxine 150 MICROGram(s) Oral daily    ========================INFECTIOUS DISEASE================  COVID-19  - on RA; infection discovered incidentally  - holding on treatment with dexamethasone and remdesivir  - Stable WBC, afebrile  - observe off abx    Patient requires continuous monitoring with bedside rhythm monitoring, pulse ox monitoring, and intermittent blood gas analysis. Care plan discussed with ICU care team. Patient remained critical and at risk for life threatening decompensation.  Patient seen, examined and plan discussed with CCU team during rounds.     I have personally provided __35__ minutes of critical care time excluding time spent on separate procedures, in addition to initial critical care time provided by the CICU Attending, Dr. Laguna.     By signing my name below, I, Yris Chavarria, attest that this documentation has been prepared under the direction and in the presence of THONY Mauricio  Electronically signed: David Sloan, 01-13-22 @ 19:49    I, THONY Mauricio, personally performed the services described in this documentation. all medical record entries made by the scribe were at my direction and in my presence. I have reviewed the chart and agree that the record reflects my personal performance and is accurate and complete  Electronically signed: THONY Mauricio

## 2022-01-13 NOTE — PATIENT PROFILE ADULT - FALL HARM RISK - UNIVERSAL INTERVENTIONS
Bed in lowest position, wheels locked, appropriate side rails in place/Call bell, personal items and telephone in reach/Instruct patient to call for assistance before getting out of bed or chair/Non-slip footwear when patient is out of bed/[place] to call system/Physically safe environment - no spills, clutter or unnecessary equipment/Purposeful Proactive Rounding/Room/bathroom lighting operational, light cord in reach

## 2022-01-13 NOTE — ED PROVIDER NOTE - CARDIAC, MLM
Routing refill request to provider for review/approval because:  Drug not on the [facility] refill protocol       Requested Prescriptions   Pending Prescriptions Disp Refills     clobetasol (TEMOVATE) 0.05 % external ointment [Pharmacy Med Name: Clobetasol Propionate External Ointment 0.05 %] 15 g 0     Sig:  Apply thin layer to effected area twice daily       There is no refill protocol information for this order          
Normal rate, regular rhythm.  Heart sounds S1, S2.  No murmurs, rubs or gallops.

## 2022-01-13 NOTE — CONSULT NOTE ADULT - ASSESSMENT
51 year old female with PMH of hypothyroidism presents today with chest discomfort. Cardio consulted. Her EKG is showing Sinus rhythm with fusion complexes, ST depressions in V1-V3, TWI in I and AVl and her 1st set of troponin was elevated to 1500. She was also incidentally covid positive.  No evidence of volume overload on exam. Her BP is elevated here which is likely acute on chronic. Her EKG and positive CE are suggestive of NSTEMI.    Recommendations    - STAT ASA, brillinta, heparin gtt  - consider 2mg IVP morphine for chest discomfort  - Repeat EKG now  - Repeat full set CE now and trend q4 hrs  - For transfer to [facility] for cardiac cath  - Conservative management for COVID. Consider ID consult  - monitor and replete lytes, keep K>4, Mg>2  - Other cardiovascular workup will depend on clinical course.  - All other workup per primary team  - Will follow

## 2022-01-13 NOTE — H&P ADULT - NSHPREVIEWOFSYSTEMS_GEN_ALL_CORE
REVIEW OF SYSTEMS:  CONSTITUTIONAL: No fever, weight loss, or fatigue, (+) diaphoresis  EYES: No eye pain, visual disturbances, or discharge  ENT:  No difficulty hearing, tinnitus, vertigo; No sinus or throat pain  NECK: No pain or stiffness  BREASTS: No pain, masses, or nipple discharge  RESPIRATORY: No cough, wheezing, chills or hemoptysis; No shortness of breath  CARDIOVASCULAR: (+) chest pain. No palpitations, dizziness, or leg swelling  GASTROINTESTINAL: No abdominal or epigastric pain. (+) nausea. No vomiting, or hematemesis; No diarrhea or constipation. No melena or hematochezia.  GENITOURINARY: No dysuria, frequency, hematuria, or incontinence  NEUROLOGICAL: No headaches, memory loss, loss of strength, numbness, or tremors  SKIN: No itching, burning, rashes, or lesions   LYMPH NODES: No enlarged glands  ENDOCRINE: No heat or cold intolerance; No hair loss  MUSCULOSKELETAL: No joint pain or swelling; No muscle, back, or extremity pain  PSYCHIATRIC: No depression, anxiety, mood swings, or difficulty sleeping  HEME/LYMPH: No easy bruising, or bleeding gums  ALLERGY AND IMMUNOLOGIC: No hives or eczema

## 2022-01-13 NOTE — ED PROVIDER NOTE - PROGRESS NOTE DETAILS
Seen here by Cardiology Dr. Santos and will be transferred to [facility] cath by Dr. Atkins.

## 2022-01-13 NOTE — H&P ADULT - HISTORY OF PRESENT ILLNESS
***************************************************************  Eunice Montano, PGY2  Internal Medicine   pager: NS: [phone] [facility]: [phone]  ***************************************************************    TEOFILO, YU  51y  Female      Patient is a 51y old  Female who presents with a chief complaint of Chest pain    HPI:  HPI: 51 year old female with PMH of hypothyroidism presents today with chest discomfort, which started at 7:30am. This morning patient woke up feeling very diaphoretic with burning sensation in throat. She then developed substernal chest pressure, 5/10 chest pain, and nausea. Denies dyspnea, PND, orthopnea, lower extremity swelling. She was brought to ED for evaluation. Prior to today. She has never had chest pain. Never had ischemic workup. She recently established care with Cardiologist Dr. Amador Gupta for HTN. She is a former smoker of 15 years.     ED Course:   hep gtt, ticagrelor 180 mg x 1   ***************************************************************  Eunice Montano, PGY2  Internal Medicine   pager: NS: [phone] [facility]: [phone]  ***************************************************************    YU RED  51y  Female      Patient is a 51y old  Female who presents with a chief complaint of Chest pain    HPI:  HPI: 51 year old female with PMH of hypothyroidism presents today with chest discomfort, which started at 7:30am. This morning patient woke up feeling very diaphoretic with burning sensation in throat. She then developed substernal chest pressure, 5/10 chest pain, and nausea. Denies dyspnea, PND, orthopnea, lower extremity swelling. She was brought to ED for evaluation. Prior to today. She has never had chest pain. Never had ischemic workup. She recently established care with Cardiologist Dr. Amador Gupta for HTN. She is a former smoker of 15 years.     ED Course:   VS: Afebrile, /90, HR 89, RR 16, SpO2 99% on RA  Labs: trop I 1590, , CKMB 22, COVID+, CXR possible interstitial infiltrates  hep gtt, ticagrelor 180 mg x 1

## 2022-01-13 NOTE — H&P ADULT - ASSESSMENT
51 year old female with PMH of hypothyroidism presents today with chest discomfort. Cardio consulted. Her EKG is showing Sinus rhythm with fusion complexes, ST depressions in V1-V3, TWI in I and AVl and her 1st set of troponin was elevated to 1500. She was also incidentally covid positive.  No evidence of volume overload on exam. Her BP is elevated here which is likely acute on chronic. Her EKG and positive CE are suggestive of NSTEMI.    ==============NEURO=============  A&Ox3, no active issues    =========CARDIOVASCULAR=========    # NSTEMI  -s/p aspirin 81 mg, brillinta 180 mg, heparin gtt  - s/p morphine 2 mg IVP x 1 for chest pain  - Repeat EKG  - Trend cardiac enzymes Q4h  - Cardiac cath (1/13/22)  - Monitor and replete lytes, keep K>4 and Mg >2  - f/u lipid panel and A1c      ============RESPIRATORY==========  - Satting well on RA, no active issues at this time    ============RENAL==============  -   - trend BMP    ============GI===============  - Diet: NPO while in cath lab    ============ENDO============  - f/u HbA1C, TSH, Lipids  - monitor glucose    ===========HEME/ONC===========  - Trend H/H  - A/C: heparin gtt    =============ID=============  - Stable WBC, afebrile  - observe off abx    # COVID-19  - dexamethasone and remdesivir    ===========Lines/Tubes/Calderón===========  - peripherals in tact  - GOC:     ***************************************************************  Eunice Montano, PGY2  Internal Medicine   pager: NS: [phone] [facility]: [phone]  ***************************************************************     51 year old female with PMH of hypothyroidism presents today with chest discomfort. Cardio consulted. Her EKG is showing Sinus rhythm with fusion complexes, ST depressions in V1-V3, TWI in I and AVl and her 1st set of troponin was elevated to 1500. She was also incidentally covid positive.  No evidence of volume overload on exam. Her BP is elevated here which is likely acute on chronic. Her EKG and positive CE are suggestive of NSTEMI.    ==============NEURO=============  A&Ox3, no active issues    =========CARDIOVASCULAR=========    # NSTEMI  -s/p aspirin 81 mg, brillinta 180 mg, heparin gtt  - s/p morphine 2 mg IVP x 1 for chest pain  - Repeat EKG  - Trend cardiac enzymes Q4h  - Cardiac cath (1/13/22)  - Monitor and replete lytes, keep K>4 and Mg >2  - f/u lipid panel and A1c      ============RESPIRATORY==========  - Satting well on RA, no active issues at this time    ============RENAL==============  -   - trend BMP    ============GI===============  - Diet: NPO while in cath lab    ============ENDO============  - f/u HbA1C, TSH, Lipids  - monitor glucose    ===========HEME/ONC===========  - Trend H/H  - A/C: heparin gtt    =============ID=============  - Stable WBC, afebrile  - observe off abx    # COVID-19  - dexamethasone and remdesivir    ===========Lines/Tubes/Calderón===========  - peripherals in tact  - GOC: FULL CODE    ***************************************************************  Eunice Montano, PGY2  Internal Medicine   pager: NS: [phone] [facility]: [phone]  ***************************************************************     51 year old female with PMH of hypothyroidism presents today with chest discomfort. Cardio consulted. Her EKG is showing Sinus rhythm with fusion complexes, ST depressions in V1-V3, TWI in I and AVl and her 1st set of troponin was elevated to 1500. She was also incidentally covid positive.  No evidence of volume overload on exam. Her BP is elevated here which is likely acute on chronic. Her EKG and positive cardiac enzymes are suggestive of NSTEMI.    ==============NEURO=============  A&Ox3, no active issues    =========CARDIOVASCULAR=========    # NSTEMI  -s/p aspirin 81 mg, brillinta 180 mg, heparin gtt  - s/p morphine 2 mg IVP x 1 for chest pain  - Repeat EKG  - Trend cardiac enzymes Q4h  - Cardiac cath (1/13/22)  - Monitor and replete lytes, keep K>4 and Mg >2  - f/u lipid panel and A1c      ============RESPIRATORY==========  - Satting well on RA, no active issues at this time    ============RENAL==============  -   - trend BMP    ============GI===============  - Diet: NPO while in cath lab    ============ENDO============  - f/u HbA1C, TSH, Lipids  - monitor glucose    ===========HEME/ONC===========  - Trend H/H  - A/C: heparin gtt    =============ID=============  - Stable WBC, afebrile  - observe off abx    # COVID-19  - dexamethasone and remdesivir    ===========Lines/Tubes/Calderón===========  - peripherals in tact  - GOC: FULL CODE    ***************************************************************  Eunice Charmaine, PGY2  Internal Medicine   pager: NS: [phone] [facility]: [phone]  ***************************************************************     51 year old female with PMH of hypothyroidism presents today with chest discomfort. Cardio consulted. Her EKG is showing Sinus rhythm with fusion complexes, ST depressions in V1-V3, TWI in I and AVl and her 1st set of troponin was elevated to 1500. She was also incidentally covid positive.  No evidence of volume overload on exam. Her BP is elevated here which is likely acute on chronic. Her EKG and positive cardiac enzymes are suggestive of NSTEMI.    ==============NEURO=============  A&Ox3, no active issues    =========CARDIOVASCULAR=========    # NSTEMI  -s/p aspirin 81 mg, brillinta 180 mg, heparin gtt  - s/p morphine 2 mg IVP x 1 for chest pain  - Repeat EKG  - Trend cardiac enzymes Q4h  - Cardiac cath (1/13/22) s/p balloon angioplasty and placed on integrilin drip with plans to return to cath lab to reassess clot burden tomorrow  - Monitor and replete lytes, keep K>4 and Mg >2  - f/u lipid panel and A1c      ============RESPIRATORY==========  - Satting well on RA, no active issues at this time    ============RENAL==============  -   - trend BMP    ============GI===============  - Diet: NPO while in cath lab    ============ENDO============  - f/u HbA1C, TSH, Lipids  - monitor glucose    ===========HEME/ONC===========  - Trend H/H  - A/C: heparin gtt    =============ID=============  - Stable WBC, afebrile  - observe off abx    # COVID-19  - dexamethasone and remdesivir    ===========Lines/Tubes/Calderón===========  - peripherals in tact  - GO: FULL CODE    ***************************************************************  Eunice Montano, PGY2  Internal Medicine   pager: NS: [phone] [facility]: [phone]  ***************************************************************     51 year old female with PMH of hypothyroidism presents today with chest discomfort. Cardio consulted. Her EKG is showing Sinus rhythm with fusion complexes, ST depressions in V1-V3, TWI in I and AVl and her 1st set of troponin was elevated to 1500. She was also incidentally covid positive.  No evidence of volume overload on exam. Her BP is elevated here which is likely acute on chronic. Her EKG and positive cardiac enzymes are suggestive of NSTEMI.    ==============NEURO=============  A&Ox3, no active issues    =========CARDIOVASCULAR=========    # NSTEMI  -s/p aspirin 81 mg, brillinta 180 mg, integralin gtt x18 hours post-cath  - s/p morphine 2 mg IVP x 1 for chest pain  - Repeat EKG  - Trend cardiac enzymes Q6h to peak  - Cardiac cath (1/13/22) s/p balloon angioplasty to OM1 and placed on integrilin drip with plans to return to cath lab to reassess clot burden tomorrow         - considering SCAD vs microvascular disease vs CAD  - Monitor and replete lytes, keep K>4 and Mg >2  - f/u lipid panel and A1c      ============RESPIRATORY==========  - Satting well on RA, no active issues at this time    ============RENAL==============  - Cr 0.92  - trend BMP    ============GI===============  - Diet: NPO while in cath lab    ============ENDO============  - f/u HbA1C, TSH, Lipids  - monitor glucose    ===========HEME/ONC===========  - Trend H/H  - A/C: integralin gtt    =============ID=============  - Stable WBC, afebrile  - observe off abx    # COVID-19  - on RA; infection discovered incidentally  - holding on treatment with dexamethasone and remdesivir    ===========Lines/Tubes/Calderón===========  - peripherals intact  - GOC: FULL CODE       51 year old female with PMH of hypothyroidism presents today with chest discomfort. Cardio consulted. Her EKG is showing Sinus rhythm with fusion complexes, ST depressions in V1-V3, TWI in I and AVl and her 1st set of troponin was elevated to 1500. She was also incidentally covid positive.  No evidence of volume overload on exam. Her BP is elevated here which is likely acute on chronic. Her EKG and positive cardiac enzymes are suggestive of NSTEMI.    ==============NEURO=============  A&Ox3, no active issues    =========CARDIOVASCULAR=========    # NSTEMI  -s/p aspirin 81 mg, brillinta 180 mg, integralin gtt x18 hours post-cath  - s/p morphine 2 mg IVP x 1 for chest pain; pain/nausea control PRN  - Repeat EKG  - Trend cardiac enzymes Q6h to peak  - Cardiac cath (1/13/22) s/p balloon angioplasty to OM1 and placed on integrilin drip with plans to return to cath lab to reassess clot burden tomorrow         - considering SCAD vs microvascular disease vs CAD  - Monitor and replete lytes, keep K>4 and Mg >2  - f/u lipid panel and A1c  - f/u TTE      ============RESPIRATORY==========  - Satting well on RA, no active issues at this time    ============RENAL==============  - Cr 0.92  - trend BMP    ============GI===============  - Diet: NPO while in cath lab    ============ENDO============  - f/u HbA1C, Lipids  - monitor glucose  - Hypothyroidism: patient takes levothyroxine 150mcg at home (skipped day of admission)    ===========HEME/ONC===========  - Trend H/H  - A/C: integralin gtt    =============ID=============  - Stable WBC, afebrile  - observe off abx    # COVID-19  - on RA; infection discovered incidentally  - holding on treatment with dexamethasone and remdesivir    ===========Lines/Tubes/Calderón===========  - peripherals intact  - GOC: FULL CODE

## 2022-01-13 NOTE — ED PROVIDER NOTE - OBJECTIVE STATEMENT
50 yo black female with H/O Hypothyroidism who was well up until this morning at 0730 when she noted the onset of burning anterior neck and chest pain with marked diaphoresis and mild nausea. No fever, chills, cough, SOB or abdominal pains. Never had chest pains before.

## 2022-01-13 NOTE — CONSULT NOTE ADULT - SUBJECTIVE AND OBJECTIVE BOX
[facility]         Liz Dockery, Danielle, Albert, Radha, Kit, Frankie        [phone] (office)    Reason for Consult: Chest pain     HPI: 51 year old female with PMH of hypothyroidism presents today with chest discomfort. This morning patient woke up 7:30 am feeling very diaphoretic with burning sensation in throat. She then developed substernal chest pressure and nausea. Denies dyspnea, PND, orthopnea, le swelling. She was brought to ED for evaluation. Prior to today. She has never had chest pain. Never had ischemic workup. She recently established care with Cardiologist Dr. Amador Gupta for HTN. She is a former smoker of 15 years.       PAST MEDICAL & SURGICAL HISTORY:  Hypothyroidism    No significant past surgical history        SOCIAL HISTORY: No active tobacco, alcohol or illicit drug use. Former smoker of 12 years.    FAMILY HISTORY: Mom HTN, HLD, DM.       Home Medications:      MEDICATIONS  (STANDING):  heparin   Injectable 5000 Unit(s) IV Push once  heparin  Infusion.  Unit(s)/Hr (10 mL/Hr) IV Continuous <Continuous>  ticagrelor 180 milliGRAM(s) Oral Once    MEDICATIONS  (PRN):  heparin   Injectable 6000 Unit(s) IV Push every 6 hours PRN For aPTT less than 40      Allergies    No Known Allergies    Intolerances        REVIEW OF SYSTEMS: Negative except as per HPI.    VITAL SIGNS:   Vital Signs Last 24 Hrs  T(C): 36.7 (13 Jan 2022 11:17), Max: 36.7 (13 Jan 2022 11:17)  T(F): 98 (13 Jan 2022 11:17), Max: 98 (13 Jan 2022 11:17)  HR: 72 (13 Jan 2022 11:54) (72 - 89)  BP: 162/92 (13 Jan 2022 11:54) (162/92 - 171/90)  BP(mean): --  RR: 17 (13 Jan 2022 11:54) (16 - 17)  SpO2: 97% (13 Jan 2022 11:54) (97% - 99%)    I&O's Summary      PHYSICAL EXAM:  Constitutional: NAD, obese  HEENT NC/AT, moist mucous membranes  Pulmonary: Non-labored, breath sounds are clear bilaterally, no wheezing, rales or rhonchi  Cardiovascular: +S1, S2, RRR, no murmur  Gastrointestinal: Soft, nontender, nondistended, normoactive bowel sounds  Extremities: No peripheral edema   Neurological: Alert, strength and sensitivity are grossly intact  Skin: No obvious lesions/rashes  Psych: Mood & affect appropriate    LABS: All Labs Reviewed:                        12.3   6.84  )-----------( 327      ( 13 Jan 2022 12:06 )             39.9     13 Jan 2022 12:06    140    |  105    |  6      ----------------------------<  120    3.9     |  26     |  0.92     Ca    9.1        13 Jan 2022 12:06    TPro  8.6    /  Alb  3.6    /  TBili  0.5    /  DBili  x      /  AST  34     /  ALT  25     /  AlkPhos  63     13 Jan 2022 12:06          Blood Culture:         EKG: Sinus rhythm with fusion complexes, ST depressions in V1-V3, TWI in I and AVl    RADIOLOGY:    CXR: lungs clear (wet read)

## 2022-01-14 LAB
A1C WITH ESTIMATED AVERAGE GLUCOSE RESULT: 5.9 % — HIGH (ref 4–5.6)
ALBUMIN SERPL ELPH-MCNC: 4.2 G/DL — SIGNIFICANT CHANGE UP (ref 3.3–5)
ALP SERPL-CCNC: 59 U/L — SIGNIFICANT CHANGE UP (ref 40–120)
ALT FLD-CCNC: 82 U/L — HIGH (ref 10–45)
ANION GAP SERPL CALC-SCNC: 17 MMOL/L — SIGNIFICANT CHANGE UP (ref 5–17)
APTT BLD: 25.3 SEC — LOW (ref 27.5–35.5)
AST SERPL-CCNC: 483 U/L — HIGH (ref 10–40)
BILIRUB SERPL-MCNC: 0.7 MG/DL — SIGNIFICANT CHANGE UP (ref 0.2–1.2)
BUN SERPL-MCNC: 8 MG/DL — SIGNIFICANT CHANGE UP (ref 7–23)
CALCIUM SERPL-MCNC: 9.8 MG/DL — SIGNIFICANT CHANGE UP (ref 8.4–10.5)
CHLORIDE SERPL-SCNC: 100 MMOL/L — SIGNIFICANT CHANGE UP (ref 96–108)
CHOLEST SERPL-MCNC: 159 MG/DL — SIGNIFICANT CHANGE UP
CK MB BLD-MCNC: 6.4 % — HIGH (ref 0–3.5)
CK MB BLD-MCNC: 7.7 % — HIGH (ref 0–3.5)
CK MB CFR SERPL CALC: 290.1 NG/ML — HIGH (ref 0–3.8)
CK MB CFR SERPL CALC: 372.6 NG/ML — HIGH (ref 0–3.8)
CK SERPL-CCNC: 4567 U/L — HIGH (ref 25–170)
CK SERPL-CCNC: 4849 U/L — HIGH (ref 25–170)
CO2 SERPL-SCNC: 22 MMOL/L — SIGNIFICANT CHANGE UP (ref 22–31)
CREAT SERPL-MCNC: 0.73 MG/DL — SIGNIFICANT CHANGE UP (ref 0.5–1.3)
ESTIMATED AVERAGE GLUCOSE: 123 MG/DL — HIGH (ref 68–114)
GLUCOSE SERPL-MCNC: 143 MG/DL — HIGH (ref 70–99)
HCT VFR BLD CALC: 36.9 % — SIGNIFICANT CHANGE UP (ref 34.5–45)
HDLC SERPL-MCNC: 58 MG/DL — SIGNIFICANT CHANGE UP
HGB BLD-MCNC: 11.4 G/DL — LOW (ref 11.5–15.5)
LIPID PNL WITH DIRECT LDL SERPL: 89 MG/DL — SIGNIFICANT CHANGE UP
MAGNESIUM SERPL-MCNC: 2 MG/DL — SIGNIFICANT CHANGE UP (ref 1.6–2.6)
MCHC RBC-ENTMCNC: 26.8 PG — LOW (ref 27–34)
MCHC RBC-ENTMCNC: 30.9 GM/DL — LOW (ref 32–36)
MCV RBC AUTO: 86.8 FL — SIGNIFICANT CHANGE UP (ref 80–100)
NON HDL CHOLESTEROL: 101 MG/DL — SIGNIFICANT CHANGE UP
NRBC # BLD: 0 /100 WBCS — SIGNIFICANT CHANGE UP (ref 0–0)
PHOSPHATE SERPL-MCNC: 4.1 MG/DL — SIGNIFICANT CHANGE UP (ref 2.5–4.5)
PLATELET # BLD AUTO: 317 K/UL — SIGNIFICANT CHANGE UP (ref 150–400)
POTASSIUM SERPL-MCNC: 3.8 MMOL/L — SIGNIFICANT CHANGE UP (ref 3.5–5.3)
POTASSIUM SERPL-SCNC: 3.8 MMOL/L — SIGNIFICANT CHANGE UP (ref 3.5–5.3)
PROT SERPL-MCNC: 7.6 G/DL — SIGNIFICANT CHANGE UP (ref 6–8.3)
RBC # BLD: 4.25 M/UL — SIGNIFICANT CHANGE UP (ref 3.8–5.2)
RBC # FLD: 14.9 % — HIGH (ref 10.3–14.5)
SODIUM SERPL-SCNC: 139 MMOL/L — SIGNIFICANT CHANGE UP (ref 135–145)
TRIGL SERPL-MCNC: 61 MG/DL — SIGNIFICANT CHANGE UP
TROPONIN T, HIGH SENSITIVITY RESULT: 3973 NG/L — HIGH (ref 0–51)
TSH SERPL-MCNC: 0.27 UIU/ML — SIGNIFICANT CHANGE UP (ref 0.27–4.2)
WBC # BLD: 12.9 K/UL — HIGH (ref 3.8–10.5)
WBC # FLD AUTO: 12.9 K/UL — HIGH (ref 3.8–10.5)

## 2022-01-14 PROCEDURE — 99232 SBSQ HOSP IP/OBS MODERATE 35: CPT

## 2022-01-14 PROCEDURE — 93454 CORONARY ARTERY ANGIO S&I: CPT | Mod: 26

## 2022-01-14 PROCEDURE — 99291 CRITICAL CARE FIRST HOUR: CPT

## 2022-01-14 PROCEDURE — 93010 ELECTROCARDIOGRAM REPORT: CPT

## 2022-01-14 PROCEDURE — 93306 TTE W/DOPPLER COMPLETE: CPT | Mod: 26

## 2022-01-14 PROCEDURE — 99152 MOD SED SAME PHYS/QHP 5/>YRS: CPT

## 2022-01-14 RX ORDER — POTASSIUM CHLORIDE 20 MEQ
20 PACKET (EA) ORAL ONCE
Refills: 0 | Status: COMPLETED | OUTPATIENT
Start: 2022-01-14 | End: 2022-01-14

## 2022-01-14 RX ORDER — METOPROLOL TARTRATE 50 MG
12.5 TABLET ORAL EVERY 12 HOURS
Refills: 0 | Status: DISCONTINUED | OUTPATIENT
Start: 2022-01-14 | End: 2022-01-14

## 2022-01-14 RX ORDER — ASPIRIN/CALCIUM CARB/MAGNESIUM 324 MG
81 TABLET ORAL DAILY
Refills: 0 | Status: DISCONTINUED | OUTPATIENT
Start: 2022-01-14 | End: 2022-01-17

## 2022-01-14 RX ORDER — METOPROLOL TARTRATE 50 MG
25 TABLET ORAL DAILY
Refills: 0 | Status: DISCONTINUED | OUTPATIENT
Start: 2022-01-15 | End: 2022-01-17

## 2022-01-14 RX ORDER — ACETAMINOPHEN 500 MG
650 TABLET ORAL ONCE
Refills: 0 | Status: COMPLETED | OUTPATIENT
Start: 2022-01-14 | End: 2022-01-14

## 2022-01-14 RX ADMIN — Medication 150 MICROGRAM(S): at 07:45

## 2022-01-14 RX ADMIN — LIDOCAINE 1 PATCH: 4 CREAM TOPICAL at 12:25

## 2022-01-14 RX ADMIN — Medication 12.5 MILLIGRAM(S): at 17:16

## 2022-01-14 RX ADMIN — TICAGRELOR 90 MILLIGRAM(S): 90 TABLET ORAL at 05:11

## 2022-01-14 RX ADMIN — Medication 20 MILLIEQUIVALENT(S): at 05:10

## 2022-01-14 RX ADMIN — LIDOCAINE 1 PATCH: 4 CREAM TOPICAL at 22:31

## 2022-01-14 RX ADMIN — Medication 650 MILLIGRAM(S): at 20:43

## 2022-01-14 RX ADMIN — LIDOCAINE 1 PATCH: 4 CREAM TOPICAL at 07:29

## 2022-01-14 RX ADMIN — HEPARIN SODIUM 5000 UNIT(S): 5000 INJECTION INTRAVENOUS; SUBCUTANEOUS at 21:09

## 2022-01-14 RX ADMIN — HEPARIN SODIUM 5000 UNIT(S): 5000 INJECTION INTRAVENOUS; SUBCUTANEOUS at 14:28

## 2022-01-14 RX ADMIN — Medication 650 MILLIGRAM(S): at 21:42

## 2022-01-14 RX ADMIN — Medication 12.5 MILLIGRAM(S): at 05:11

## 2022-01-14 RX ADMIN — Medication 81 MILLIGRAM(S): at 11:10

## 2022-01-14 RX ADMIN — HEPARIN SODIUM 5000 UNIT(S): 5000 INJECTION INTRAVENOUS; SUBCUTANEOUS at 05:11

## 2022-01-14 NOTE — PROGRESS NOTE ADULT - SUBJECTIVE AND OBJECTIVE BOX
YU RED  MRN-[id number]  Patient is a 51y old  Female who presents with a chief complaint of NSTEMI (13 Jan 2022 19:49)    HPI:  ***************************************************************  Eunice Montano, PGY2  Internal Medicine   pager: NS: [phone] [facility]: [id number]  ***************************************************************    YU RED  51y  Female      Patient is a 51y old  Female who presents with a chief complaint of Chest pain    HPI:  HPI: 51 year old female with PMH of hypothyroidism presents today with chest discomfort, which started at 7:30am. This morning patient woke up feeling very diaphoretic with burning sensation in throat. She then developed substernal chest pressure, 5/10 chest pain, and nausea. Denies dyspnea, PND, orthopnea, lower extremity swelling. She was brought to ED for evaluation. Prior to today. She has never had chest pain. Never had ischemic workup. She recently established care with Cardiologist Dr. Amador Gupta for HTN. She is a former smoker of 15 years.     ED Course:   VS: Afebrile, /90, HR 89, RR 16, SpO2 99% on RA  Labs: trop I 1590, , CKMB 22, COVID+, CXR possible interstitial infiltrates  hep gtt, ticagrelor 180 mg x 1   (13 Jan 2022 15:11)      24 HOUR EVENTS:    REVIEW OF SYSTEMS:    CONSTITUTIONAL: No weakness, fevers or chills  EYES/ENT: No visual changes;  No vertigo or throat pain   NECK: No pain or stiffness  RESPIRATORY: No cough, wheezing, hemoptysis; No shortness of breath  CARDIOVASCULAR: No chest pain or palpitations  GASTROINTESTINAL: No abdominal or epigastric pain. No nausea, vomiting, or hematemesis; No diarrhea or constipation. No melena or hematochezia.  GENITOURINARY: No dysuria, frequency or hematuria  NEUROLOGICAL: No numbness or weakness  SKIN: No itching, rashes      ICU Vital Signs Last 24 Hrs  T(C): 37.3 (14 Jan 2022 04:00), Max: 37.3 (14 Jan 2022 04:00)  T(F): 99.1 (14 Jan 2022 04:00), Max: 99.1 (14 Jan 2022 04:00)  HR: 91 (14 Jan 2022 06:00) (72 - 98)  BP: 105/65 (14 Jan 2022 06:00) (100/64 - 171/90)  BP(mean): 78 (14 Jan 2022 06:00) (74 - 106)  ABP: --  ABP(mean): --  RR: 14 (14 Jan 2022 05:00) (12 - 23)  SpO2: 97% (14 Jan 2022 06:00) (94% - 100%)      CVP(mm Hg): --  CO: --  CI: --  PA: --  PA(mean): --  PA(direct): --  PCWP: --  LA: --  RA: --  SVR: --  SVRI: --  PVR: --  PVRI: --  I&O's Summary    13 Jan 2022 07:01  -  14 Jan 2022 07:00  --------------------------------------------------------  IN: 815 mL / OUT: 550 mL / NET: 265 mL        CAPILLARY BLOOD GLUCOSE    CAPILLARY BLOOD GLUCOSE          PHYSICAL EXAM:  GENERAL: No acute distress, well-developed  HEAD:  Atraumatic, Normocephalic  EYES: EOMI, PERRLA, conjunctiva and sclera clear  NECK: Supple, no lymphadenopathy, no JVD  CHEST/LUNG: CTAB; No wheezes, rales, or rhonchi  HEART: Regular rate and rhythm. Normal S1/S2. No murmurs, rubs, or gallops  ABDOMEN: Soft, non-tender, non-distended; normal bowel sounds, no organomegaly  EXTREMITIES:  2+ peripheral pulses b/l, No clubbing, cyanosis, or edema  NEUROLOGY: A&O x 3, no focal deficits  SKIN: No rashes or lesions    ============================I/O===========================   I&O's Detail    13 Jan 2022 07:01  -  14 Jan 2022 07:00  --------------------------------------------------------  IN:    Eptifbatide: 285 mL    IV PiggyBack: 50 mL    Oral Fluid: 480 mL  Total IN: 815 mL    OUT:    Voided (mL): 550 mL  Total OUT: 550 mL    Total NET: 265 mL        ============================ LABS =========================                        11.4   12.90 )-----------( 317      ( 14 Jan 2022 01:23 )             36.9     01-14    139  |  100  |  8   ----------------------------<  143<H>  3.8   |  22  |  0.73    Ca    9.8      14 Jan 2022 01:23  Phos  4.1     01-14  Mg     2.0     01-14    TPro  7.6  /  Alb  4.2  /  TBili  0.7  /  DBili  x   /  AST  483<H>  /  ALT  82<H>  /  AlkPhos  59  01-14    Troponin T, High Sensitivity Result: 3973 ng/L (01-14-22 @ 01:23)  Troponin T, High Sensitivity Result: 3085 ng/L (01-13-22 @ 17:11)    CKMB Units: 372.6 ng/mL (01-14-22 @ 01:23)  CKMB Units: 276.1 ng/mL (01-13-22 @ 17:11)  CKMB Units: 22.0 ng/mL (01-13-22 @ 12:06)    Creatine Kinase, Serum: 4849 U/L (01-14-22 @ 01:23)  Creatine Kinase, Serum: 3140 U/L (01-13-22 @ 17:11)  Creatine Kinase, Serum: 283 U/L (01-13-22 @ 12:06)    CPK Mass Assay %: 7.7 % (01-14-22 @ 01:23)  CPK Mass Assay %: 8.8 % (01-13-22 @ 17:11)  CPK Mass Assay %: 7.8 % (01-13-22 @ 12:06)        LIVER FUNCTIONS - ( 14 Jan 2022 01:23 )  Alb: 4.2 g/dL / Pro: 7.6 g/dL / ALK PHOS: 59 U/L / ALT: 82 U/L / AST: 483 U/L / GGT: x           PT/INR - ( 13 Jan 2022 13:12 )   PT: 13.0 sec;   INR: 1.12 ratio         PTT - ( 14 Jan 2022 01:24 )  PTT:25.3 sec        ======================Micro/Rad/Cardio=================  Telemetry: Reviewed   EKG: Reviewed  CXR: Reviewed  Culture: Reviewed   Echo:   Cath:   ======================================================  PAST MEDICAL & SURGICAL HISTORY:  Hypothyroidism    No significant past surgical history     YU RED  MRN-[id number]  Patient is a 51y old  Female who presents with a chief complaint of NSTEMI (13 Jan 2022 19:49)    HPI:  ***************************************************************  Eunice Montano, PGY2  Internal Medicine   pager: NS: [phone] [facility]: [phone]  ***************************************************************    YU RED  51y  Female      Patient is a 51y old  Female who presents with a chief complaint of Chest pain    HPI:  HPI: 51 year old female with PMH of hypothyroidism presents today with chest discomfort, which started at 7:30am. This morning patient woke up feeling very diaphoretic with burning sensation in throat. She then developed substernal chest pressure, 5/10 chest pain, and nausea. Denies dyspnea, PND, orthopnea, lower extremity swelling. She was brought to ED for evaluation. Prior to today. She has never had chest pain. Never had ischemic workup. She recently established care with Cardiologist Dr. Amador Gupta for HTN. She is a former smoker of 15 years.     ED Course:   VS: Afebrile, /90, HR 89, RR 16, SpO2 99% on RA  Labs: trop I 1590, , CKMB 22, COVID+, CXR possible interstitial infiltrates  hep gtt, ticagrelor 180 mg x 1   (13 Jan 2022 15:11)      24 HOUR EVENTS:  Patient overnight had no acute events. She continues on ticagrelor 90 mg BID after being loaded yesterday and aspirin 81 mg qd. A lidocaine patch was placed for pain. She has pain at the right groin site and was uncomfortable generally but denies CP, SOB, headache, abdominal pain, edema. She is also continued on integrilin gtt @19 cc/hr. Lopressor 12.5 mg PO BID was started overnight. The angiogram was performed yesterday and 100% thrombosis seen with MABEL flow 0 in the second obtuse marginal artery. Balloon angioplasty was performed and there was 30% residual stenosis after intervention.     REVIEW OF SYSTEMS:    CONSTITUTIONAL: No weakness, fevers or chills  EYES/ENT: No visual changes;  No vertigo or throat pain   NECK: No pain or stiffness  RESPIRATORY: No cough, wheezing, hemoptysis; No shortness of breath  CARDIOVASCULAR: No chest pain or palpitations  GASTROINTESTINAL: No abdominal or epigastric pain. No nausea, vomiting, or hematemesis; No diarrhea or constipation. No melena or hematochezia.  GENITOURINARY: No dysuria, frequency or hematuria  NEUROLOGICAL: No numbness or weakness  SKIN: No itching, rashes (+) pain at Right groin site      ICU Vital Signs Last 24 Hrs  T(C): 37.3 (14 Jan 2022 04:00), Max: 37.3 (14 Jan 2022 04:00)  T(F): 99.1 (14 Jan 2022 04:00), Max: 99.1 (14 Jan 2022 04:00)  HR: 91 (14 Jan 2022 06:00) (72 - 98)  BP: 105/65 (14 Jan 2022 06:00) (100/64 - 171/90)  BP(mean): 78 (14 Jan 2022 06:00) (74 - 106)  ABP: --  ABP(mean): --  RR: 14 (14 Jan 2022 05:00) (12 - 23)  SpO2: 97% (14 Jan 2022 06:00) (94% - 100%)      CVP(mm Hg): --  CO: --  CI: --  PA: --  PA(mean): --  PA(direct): --  PCWP: --  LA: --  RA: --  SVR: --  SVRI: --  PVR: --  PVRI: --  I&O's Summary    13 Jan 2022 07:01  -  14 Jan 2022 07:00  --------------------------------------------------------  IN: 815 mL / OUT: 550 mL / NET: 265 mL        CAPILLARY BLOOD GLUCOSE    CAPILLARY BLOOD GLUCOSE          PHYSICAL EXAM:  GENERAL: No acute distress, well-developed  HEAD:  Atraumatic, Normocephalic  EYES: EOMI, PERRLA, conjunctiva and sclera clear  NECK: Supple, no lymphadenopathy, no JVD  CHEST/LUNG: CTAB; No wheezes, rales, or rhonchi  HEART: Regular rate and rhythm. Normal S1/S2. No murmurs, rubs, or gallops  ABDOMEN: Soft, non-tender, non-distended; normal bowel sounds, no organomegaly  EXTREMITIES:  2+ peripheral pulses b/l, No clubbing, cyanosis, or edema  NEUROLOGY: A&O x 3, no focal deficits  SKIN: No rashes or lesions    ============================I/O===========================   I&O's Detail    13 Jan 2022 07:01  -  14 Jan 2022 07:00  --------------------------------------------------------  IN:    Eptifbatide: 285 mL    IV PiggyBack: 50 mL    Oral Fluid: 480 mL  Total IN: 815 mL    OUT:    Voided (mL): 550 mL  Total OUT: 550 mL    Total NET: 265 mL        ============================ LABS =========================                        11.4   12.90 )-----------( 317      ( 14 Jan 2022 01:23 )             36.9     01-14    139  |  100  |  8   ----------------------------<  143<H>  3.8   |  22  |  0.73    Ca    9.8      14 Jan 2022 01:23  Phos  4.1     01-14  Mg     2.0     01-14    TPro  7.6  /  Alb  4.2  /  TBili  0.7  /  DBili  x   /  AST  483<H>  /  ALT  82<H>  /  AlkPhos  59  01-14    Troponin T, High Sensitivity Result: 3973 ng/L (01-14-22 @ 01:23)  Troponin T, High Sensitivity Result: 3085 ng/L (01-13-22 @ 17:11)    CKMB Units: 372.6 ng/mL (01-14-22 @ 01:23)  CKMB Units: 276.1 ng/mL (01-13-22 @ 17:11)  CKMB Units: 22.0 ng/mL (01-13-22 @ 12:06)    Creatine Kinase, Serum: 4849 U/L (01-14-22 @ 01:23)  Creatine Kinase, Serum: 3140 U/L (01-13-22 @ 17:11)  Creatine Kinase, Serum: 283 U/L (01-13-22 @ 12:06)    CPK Mass Assay %: 7.7 % (01-14-22 @ 01:23)  CPK Mass Assay %: 8.8 % (01-13-22 @ 17:11)  CPK Mass Assay %: 7.8 % (01-13-22 @ 12:06)        LIVER FUNCTIONS - ( 14 Jan 2022 01:23 )  Alb: 4.2 g/dL / Pro: 7.6 g/dL / ALK PHOS: 59 U/L / ALT: 82 U/L / AST: 483 U/L / GGT: x           PT/INR - ( 13 Jan 2022 13:12 )   PT: 13.0 sec;   INR: 1.12 ratio         PTT - ( 14 Jan 2022 01:24 )  PTT:25.3 sec        ======================Micro/Rad/Cardio=================  Telemetry: Reviewed   EKG: Reviewed  CXR: Reviewed  Culture: Reviewed   Echo:   Cath:   ======================================================  PAST MEDICAL & SURGICAL HISTORY:  Hypothyroidism    No significant past surgical history

## 2022-01-14 NOTE — CONSULT NOTE ADULT - ASSESSMENT
51 year old female with PMH of hypothyroidism p/w NSTEMI with thrombotic lesion in OM1 s/p POBA    - Now with Integrilin gtt. will dc today  - plan for repeat angio today to assess if pt truly had SCAD or if there is a plaque possibly requiring PCI.  - Cont DAPT  - cont lipitor 80mg Qday  - started on metoprolol 12.5mg Q12  - check echo   - eventual ace/arb as bp tolerated  - Appears compensated from HF POV.   - COVID +. no signs of hypoxia. Supplemental o2 as needed.     - Further cardiac workup will depend on clinical course.   - Upon my evaluation, this patient is at high risk for imminent or life threatening deterioration due to NSTEMI  and other active medical issues which require my direct attention, intervention, and personal management.  I have personally spent >30 minutes  of critical care time exclusive of time spent on separate billing procedures. This includes review of laboratory data, radiology results, discussion with primary team\patient, and monitoring for potential decompensation. Interventions were performed as documented above.

## 2022-01-14 NOTE — CHART NOTE - NSCHARTNOTEFT_GEN_A_CORE
CICU Transfer Note    Transfer from: CICU    Transfer to: (  ) Medicine    ( x ) Telemetry     (   ) RCU        (    ) Palliative         (   ) Stroke Unit          (   ) __________________    Accepting Physician:      GENE/SUJIT COURSE:          ASSESSMENT & PLAN:     51 year old female with PMH of hypothyroidism presents today with chest discomfort, which started at 7:30am. This morning patient woke up feeling very diaphoretic with burning sensation in throat. She then developed substernal chest pressure, 5/10 chest pain, and nausea. Denies dyspnea, PND, orthopnea, lower extremity swelling. She was brought to ED for evaluation. Prior to today. She has never had chest pain. Never had ischemic workup. She recently established care with Cardiologist Dr. Amador Gupta for HTN. She is a former smoker of 15 years. In the ED, patient was afebrile, /90, HR 89, RR 16, SpO2 99% on RA. Trop 1590, , CKMB 22, COVID+, CXR possible interstitial infiltrates. She was placed on an integrilin gtt@ 19cc/hr and was started on aspirin and loaded with ticagrelor 180 mg x 1 and placed on standing ticagrelor 90 mg BID. She did not receive treatment for COVID-19 since she remained asymptomatic and was saturating well on RA. Integrilin gtt was stopped at 10am today. She is also continued on integrilin gtt @19 cc/hr. Lopressor 12.5 mg PO BID was started overnight. The angiogram was performed yesterday and 100% thrombosis seen with MABEL flow 0 in the second obtuse marginal artery. Balloon angioplasty was performed and there was 30% residual stenosis after intervention. Today, patient had another angiogram and the interventionalist found 100% stenosis in the first and second obtuse marginal artery.         FOR FOLLOW UP: CICU Transfer Note    Transfer from: CICU    Transfer to: (  ) Medicine    ( x ) Telemetry     (   ) RCU        (    ) Palliative         (   ) Stroke Unit          (   ) __________________    Accepting Physician:      GENE/CICU COURSE:    51 year old female with PMH of hypothyroidism presents today with chest discomfort, which started at 7:30am. This morning patient woke up feeling very diaphoretic with burning sensation in throat. She then developed substernal chest pressure, 5/10 chest pain, and nausea. Denies dyspnea, PND, orthopnea, lower extremity swelling. She was brought to ED for evaluation. Prior to today. She has never had chest pain. Never had ischemic workup. She recently established care with Cardiologist Dr. Amador Gupta for HTN. She is a former smoker of 15 years. In the ED, patient was afebrile, /90, HR 89, RR 16, SpO2 99% on RA. Trop 1590, , CKMB 22, COVID+, CXR possible interstitial infiltrates. She was placed on an integrilin gtt@ 19cc/hr and was started on aspirin and loaded with ticagrelor 180 mg x 1 and placed on standing ticagrelor 90 mg BID. She did not receive treatment for COVID-19 since she remained asymptomatic and was saturating well on RA.     Lopressor 12.5 mg PO BID was started overnight.  Integrilin gtt was stopped at 10am today. The angiogram was performed yesterday and 100% thrombosis seen with MABEL flow 0 in the second obtuse marginal artery. Balloon angioplasty was performed and there was 30% residual stenosis after intervention. Today, patient had another angiogram and the interventionalist found 100% stenosis in the first and second obtuse marginal artery. Ticagrelor was stopped and aspirin was continued. ASCVD 1% and statin was stopped. She is HD stable and ready to be transferred to the floors.      ASSESSMENT & PLAN:     51 year old female with PMH of hypothyroidism presents today with chest discomfort. Cardio consulted. Her EKG is showing Sinus rhythm with fusion complexes, ST depressions in V1-V3, TWI in I and AVl and her 1st set of troponin was elevated to 1500. She was also incidentally Covid positive.  No evidence of volume overload on exam. Her EKG and positive cardiac enzymes are suggestive of NSTEMI. Now, s/p balloon angioplasty after 100% stenosis second obtuse marginal artery, with continued stenosis 100% in first and second obtuse marginal artery.    ==============NEURO=============  A&Ox3, no active issues    =========CARDIOVASCULAR=========    # NSTEMI  -s/p aspirin 81 mg, brillinta 180 mg, integralin gtt x18 hours post-cath  - c/w aspirin 81 mg qd  - s/p morphine 2 mg IVP x 1 for chest pain; pain/nausea control PRN  - Trend cardiac enzymes Q6h to peak  - Cardiac cath (1/13/22) s/p balloon angioplasty to OM1 and then again taken for angiography 1/14/22 with no intervention and continued 100% stenosis first and second obtuse marginal artery.  - Monitor and replete lytes, keep K>4 and Mg >2  - A1c 5.9, lipid panel wnl  - f/u TTE      ============RESPIRATORY==========  - Satting well on RA, no active issues at this time    ============RENAL==============  - Cr 0.92  - trend BMP    ============GI===============  - Diet: DASH/TLC diet    ============ENDO============  - lipids wnl, A1c 6.9  - Diabetic teaching ordered   - monitor glucose  - Hypothyroidism: patient takes levothyroxine 150mcg at home (skipped day of admission)    ===========HEME/ONC===========  - Trend H/H  - A/C: s/p integralin gtt, ticagrelor, atorvastatin  - c/w aspirin  - DVT ppx: SQH    =============ID=============  - Stable WBC, afebrile  - observe off abx    # COVID-19  - on RA; infection discovered incidentally  - holding on treatment with dexamethasone and remdesivir    ===========Lines/Tubes/Calderón===========  - peripherals intact  - GOC: FULL CODE        FOR FOLLOW UP:  [] monitor on telemetry  [] cardiology recommendations  [] pain management  [] TTE ordered - please get in am CICU Transfer Note    Transfer from: CICU    Transfer to: (  ) Medicine    ( x ) Telemetry     (   ) RCU        (    ) Palliative         (   ) Stroke Unit          (   ) __________________    Accepting Physician: Dr. Atkins      [facility]/CICU COURSE:    51 year old female with PMH of hypothyroidism presents today with chest discomfort, which started at 7:30am. This morning patient woke up feeling very diaphoretic with burning sensation in throat. She then developed substernal chest pressure, 5/10 chest pain, and nausea. Denies dyspnea, PND, orthopnea, lower extremity swelling. She was brought to ED for evaluation. Prior to today. She has never had chest pain. Never had ischemic workup. She recently established care with Cardiologist Dr. Amador Gupta for HTN. She is a former smoker of 15 years. In the ED, patient was afebrile, /90, HR 89, RR 16, SpO2 99% on RA. Trop 1590, , CKMB 22, COVID+, CXR possible interstitial infiltrates. She was placed on an integrilin gtt@ 19cc/hr and was started on aspirin and loaded with ticagrelor 180 mg x 1 and placed on standing ticagrelor 90 mg BID. She did not receive treatment for COVID-19 since she remained asymptomatic and was saturating well on RA.     Lopressor 12.5 mg PO BID was started overnight.  Integrilin gtt was stopped at 10am today. The angiogram was performed yesterday and 100% thrombosis seen with MABEL flow 0 in the second obtuse marginal artery. Balloon angioplasty was performed and there was 30% residual stenosis after intervention. Today, patient had another angiogram and the interventionalist found 100% stenosis in the first and second obtuse marginal artery. Ticagrelor was stopped and aspirin was continued. ASCVD 1% and statin was stopped. She is HD stable and ready to be transferred to the floors.      ASSESSMENT & PLAN:     51 year old female with PMH of hypothyroidism presents today with chest discomfort. Cardio consulted. Her EKG is showing Sinus rhythm with fusion complexes, ST depressions in V1-V3, TWI in I and AVl and her 1st set of troponin was elevated to 1500. She was also incidentally Covid positive.  No evidence of volume overload on exam. Her EKG and positive cardiac enzymes are suggestive of NSTEMI. Now, s/p balloon angioplasty after 100% stenosis second obtuse marginal artery, with continued stenosis 100% in first and second obtuse marginal artery.    ==============NEURO=============  A&Ox3, no active issues    =========CARDIOVASCULAR=========    # NSTEMI  -s/p aspirin 81 mg, brillinta 180 mg, integralin gtt x18 hours post-cath  - c/w aspirin 81 mg qd  - s/p morphine 2 mg IVP x 1 for chest pain; pain/nausea control PRN  - Trend cardiac enzymes Q6h to peak  - Cardiac cath (1/13/22) s/p balloon angioplasty to OM1 and then again taken for angiography 1/14/22 with no intervention and continued 100% stenosis first and second obtuse marginal artery.  - Monitor and replete lytes, keep K>4 and Mg >2  - A1c 5.9, lipid panel wnl  - f/u TTE      ============RESPIRATORY==========  - Satting well on RA, no active issues at this time    ============RENAL==============  - Cr 0.92  - trend BMP    ============GI===============  - Diet: DASH/TLC diet    ============ENDO============  - lipids wnl, A1c 6.9  - Diabetic teaching ordered   - monitor glucose  - Hypothyroidism: patient takes levothyroxine 150mcg at home (skipped day of admission)    ===========HEME/ONC===========  - Trend H/H  - A/C: s/p integralin gtt, ticagrelor, atorvastatin  - c/w aspirin  - DVT ppx: SQH    =============ID=============  - Stable WBC, afebrile  - observe off abx    # COVID-19  - on RA; infection discovered incidentally  - holding on treatment with dexamethasone and remdesivir    ===========Lines/Tubes/Calderón===========  - peripherals intact  - GOC: FULL CODE        FOR FOLLOW UP:  [] monitor on telemetry  [] cardiology recommendations  [] pain management  [] TTE ordered - please get in am

## 2022-01-14 NOTE — PROGRESS NOTE ADULT - SUBJECTIVE AND OBJECTIVE BOX
====================  CCU MIDNIGHT ROUNDS  ====================    YU RED  [id number]  Patient is a 51y old  Female who presents with a chief complaint of NSTEMI (14 Jan 2022 10:44)      ====================  SUMMARY: 51 year old female with PMH of hypothyroidism presents today with chest discomfort, which started at 7:30am. This morning patient woke up feeling very diaphoretic with burning sensation in throat. She then developed substernal chest pressure, 5/10 chest pain, and nausea. Denies dyspnea, PND, orthopnea, lower extremity swelling. She was brought to ED for evaluation. Prior to today. She has never had chest pain. Never had ischemic workup. She recently established care with Cardiologist Dr. Amador Gupta for HTN. She is a former smoker of 15 years.     ED Course:   VS: Afebrile, /90, HR 89, RR 16, SpO2 99% on RA  Labs: trop I 1590, , CKMB 22, COVID+, CXR possible interstitial infiltrates  hep gtt, ticagrelor 180 mg x 1   (13 Jan 2022 15:11)      Hospital Course:  1/13 Transferred to CCU for further management s/p LHC w/ POBA to OM1       ====================  VITALS (Last 12 hrs):  ====================    T(C): 37.7 (01-14-22 @ 19:00), Max: 37.7 (01-14-22 @ 19:00)  T(F): 99.8 (01-14-22 @ 19:00), Max: 99.8 (01-14-22 @ 19:00)  HR: 92 (01-14-22 @ 21:00) (84 - 104)  BP: 104/62 (01-14-22 @ 21:00) (99/68 - 119/61)  BP(mean): 77 (01-14-22 @ 21:00) (77 - 91)  ABP: --  ABP(mean): --  RR: 24 (01-14-22 @ 21:00) (18 - 27)  SpO2: 100% (01-14-22 @ 21:00) (96% - 100%)  Wt(kg): --  CVP(mm Hg): --  CVP(cm H2O): --  CO: --  CI: --  PA: --  PA(mean): --  PCWP: --  SVR: --  PVR: --    I&O's Summary    13 Jan 2022 07:01  -  14 Jan 2022 07:00  --------------------------------------------------------  IN: 834 mL / OUT: 550 mL / NET: 284 mL    14 Jan 2022 07:01  -  14 Jan 2022 21:26  --------------------------------------------------------  IN: 427 mL / OUT: 400 mL / NET: 27 mL            ====================  NEW LABS:  ====================                          11.4   12.90 )-----------( 317      ( 14 Jan 2022 01:23 )             36.9     01-14    139  |  100  |  8   ----------------------------<  143<H>  3.8   |  22  |  0.73    Ca    9.8      14 Jan 2022 01:23  Phos  4.1     01-14  Mg     2.0     01-14    TPro  7.6  /  Alb  4.2  /  TBili  0.7  /  DBili  x   /  AST  483<H>  /  ALT  82<H>  /  AlkPhos  59  01-14    PT/INR - ( 13 Jan 2022 13:12 )   PT: 13.0 sec;   INR: 1.12 ratio         PTT - ( 14 Jan 2022 01:24 )  PTT:25.3 sec  Creatine Kinase, Serum: 4567 U/L *H* (01-14-22 @ 06:23)  Creatine Kinase, Serum: 4849 U/L *H* (01-14-22 @ 01:23)    CKMB Units: 290.1 ng/mL (01-14 @ 06:23)  CKMB Units: 372.6 ng/mL (01-14 @ 01:23)          ====================  PLAN:  ====================== NEUROLOGY=====================  - A&Ox3, no active issues      ==================== RESPIRATORY======================  Covid-19  - Satting well on RA, no active issues at this time       ====================CARDIOVASCULAR==================  NSTEMI  -s/p aspirin 81 mg, brillinta 180 mg, integralin gtt x18 hours post-cath (d/c 10am 1/14)   - c/w ASA/Brilinta for DAPT   - c/w lipitor 80 high intenstiy statin   - Cardiac cath (1/13/22) s/p balloon angioplasty to OM1 and placed on integrilin drip with plans to return to cath lab to reassess clot burden tomorrow         - considering SCAD vs microvascular disease vs CAD  - GDMT as tolerated: BB w/ Toprol 25 add ACEi in AM       atorvastatin 80 milliGRAM(s) Oral at bedtime    ===================HEMATOLOGIC/ONC ===================  -CBC Stable     - HSQ in AM for VTE ppx     ===================== RENAL =========================  - Cr 0.92  - trend BMP  - Continue monitoring urine output    ==================== GASTROINTESTINAL===================  - Tolerating PO DASH/TLC diet.     potassium chloride    Tablet ER 40 milliEquivalent(s) Oral once    =======================    ENDOCRINE  =====================  Hypothyroidism  - home does Synthroid 150 mcg daily, will continue  - f/u TSH      - f/u HbA1C, Lipids  - monitor glucose    levothyroxine 150 MICROGram(s) Oral daily    ========================INFECTIOUS DISEASE================  COVID-19  - on RA; infection discovered incidentally  - holding on treatment with dexamethasone and remdesivir  - Stable WBC, afebrile  - observe off abx      Kyle Munoz PA-C CICU  ====================  CCU MIDNIGHT ROUNDS  ====================    YU RED  [id number]  Patient is a 51y old  Female who presents with a chief complaint of NSTEMI (14 Jan 2022 10:44)      ====================  SUMMARY: 51 year old female with PMH of hypothyroidism presents today with chest discomfort, which started at 7:30am. This morning patient woke up feeling very diaphoretic with burning sensation in throat. She then developed substernal chest pressure, 5/10 chest pain, and nausea. Denies dyspnea, PND, orthopnea, lower extremity swelling. She was brought to ED for evaluation. Prior to today. She has never had chest pain. Never had ischemic workup. She recently established care with Cardiologist Dr. Amador Gupta for HTN. She is a former smoker of 15 years.     ED Course:   VS: Afebrile, /90, HR 89, RR 16, SpO2 99% on RA  Labs: trop I 1590, , CKMB 22, COVID+, CXR possible interstitial infiltrates  hep gtt, ticagrelor 180 mg x 1   (13 Jan 2022 15:11)      Hospital Course:  1/13 Transferred to CCU for further management s/p LHC w/ POBA to OM1       ====================  VITALS (Last 12 hrs):  ====================    T(C): 37.7 (01-14-22 @ 19:00), Max: 37.7 (01-14-22 @ 19:00)  T(F): 99.8 (01-14-22 @ 19:00), Max: 99.8 (01-14-22 @ 19:00)  HR: 92 (01-14-22 @ 21:00) (84 - 104)  BP: 104/62 (01-14-22 @ 21:00) (99/68 - 119/61)  BP(mean): 77 (01-14-22 @ 21:00) (77 - 91)  ABP: --  ABP(mean): --  RR: 24 (01-14-22 @ 21:00) (18 - 27)  SpO2: 100% (01-14-22 @ 21:00) (96% - 100%)  Wt(kg): --  CVP(mm Hg): --  CVP(cm H2O): --  CO: --  CI: --  PA: --  PA(mean): --  PCWP: --  SVR: --  PVR: --    I&O's Summary    13 Jan 2022 07:01  -  14 Jan 2022 07:00  --------------------------------------------------------  IN: 834 mL / OUT: 550 mL / NET: 284 mL    14 Jan 2022 07:01  -  14 Jan 2022 21:26  --------------------------------------------------------  IN: 427 mL / OUT: 400 mL / NET: 27 mL            ====================  NEW LABS:  ====================                          11.4   12.90 )-----------( 317      ( 14 Jan 2022 01:23 )             36.9     01-14    139  |  100  |  8   ----------------------------<  143<H>  3.8   |  22  |  0.73    Ca    9.8      14 Jan 2022 01:23  Phos  4.1     01-14  Mg     2.0     01-14    TPro  7.6  /  Alb  4.2  /  TBili  0.7  /  DBili  x   /  AST  483<H>  /  ALT  82<H>  /  AlkPhos  59  01-14    PT/INR - ( 13 Jan 2022 13:12 )   PT: 13.0 sec;   INR: 1.12 ratio         PTT - ( 14 Jan 2022 01:24 )  PTT:25.3 sec  Creatine Kinase, Serum: 4567 U/L *H* (01-14-22 @ 06:23)  Creatine Kinase, Serum: 4849 U/L *H* (01-14-22 @ 01:23)    CKMB Units: 290.1 ng/mL (01-14 @ 06:23)  CKMB Units: 372.6 ng/mL (01-14 @ 01:23)          ====================  PLAN:  ====================== NEUROLOGY=====================  - A&Ox3, no active issues      ==================== RESPIRATORY======================  Covid-19  - Satting well on RA, no active issues at this time       ====================CARDIOVASCULAR==================  NSTEMI  - Cardiac cath (1/13/22) s/p balloon angioplasty to OM1 and placed on integrilin drip   - Staged PCI 1/14 displaying resolution of clot burden, clean cors assuming SCAD   -s/p aspirin 81 mg, brillinta 180 mg, integralin gtt x18 hours post-cath (d/c 10am 1/14)   - Holding DAPT, continued on ASA  - unclear benefit of beta blocker without CAD and AMI     atorvastatin 80 milliGRAM(s) Oral at bedtime    ===================HEMATOLOGIC/ONC ===================  -CBC Stable     - HSQ in AM for VTE ppx     ===================== RENAL =========================  - Cr 0.92  - trend BMP  - Continue monitoring urine output    ==================== GASTROINTESTINAL===================  - Tolerating PO DASH/TLC diet.     potassium chloride    Tablet ER 40 milliEquivalent(s) Oral once    =======================    ENDOCRINE  =====================  Hypothyroidism  - home does Synthroid 150 mcg daily, will continue  - f/u TSH      - f/u HbA1C, Lipids  - monitor glucose    levothyroxine 150 MICROGram(s) Oral daily    ========================INFECTIOUS DISEASE================  COVID-19  - on RA; infection discovered incidentally  - holding on treatment with dexamethasone and remdesivir  - Stable WBC, afebrile  - observe off abx      Kyle Munoz PA-C CICU

## 2022-01-14 NOTE — PROGRESS NOTE ADULT - ASSESSMENT
51 year old female with PMH of hypothyroidism presents today with chest discomfort. Cardio consulted. Her EKG is showing Sinus rhythm with fusion complexes, ST depressions in V1-V3, TWI in I and AVl and her 1st set of troponin was elevated to 1500. She was also incidentally covid positive.  No evidence of volume overload on exam. Her BP is elevated here which is likely acute on chronic. Her EKG and positive cardiac enzymes are suggestive of NSTEMI.    ==============NEURO=============  A&Ox3, no active issues    =========CARDIOVASCULAR=========    # NSTEMI  -s/p aspirin 81 mg, brillinta 180 mg, integralin gtt x18 hours post-cath  - s/p morphine 2 mg IVP x 1 for chest pain; pain/nausea control PRN  - Repeat EKG  - Trend cardiac enzymes Q6h to peak  - Cardiac cath (1/13/22) s/p balloon angioplasty to OM1 and placed on integrilin drip with plans to return to cath lab to reassess clot burden tomorrow         - considering SCAD vs microvascular disease vs CAD  - Monitor and replete lytes, keep K>4 and Mg >2  - f/u lipid panel and A1c  - f/u TTE      ============RESPIRATORY==========  - Satting well on RA, no active issues at this time    ============RENAL==============  - Cr 0.92  - trend BMP    ============GI===============  - Diet: NPO while in cath lab    ============ENDO============  - f/u HbA1C, Lipids  - monitor glucose  - Hypothyroidism: patient takes levothyroxine 150mcg at home (skipped day of admission)    ===========HEME/ONC===========  - Trend H/H  - A/C: integralin gtt    =============ID=============  - Stable WBC, afebrile  - observe off abx    # COVID-19  - on RA; infection discovered incidentally  - holding on treatment with dexamethasone and remdesivir    ===========Lines/Tubes/Calderón===========  - peripherals intact  - GOC: FULL CODE           51 year old female with PMH of hypothyroidism presents today with chest discomfort. Cardio consulted. Her EKG is showing Sinus rhythm with fusion complexes, ST depressions in V1-V3, TWI in I and AVl and her 1st set of troponin was elevated to 1500. She was also incidentally covid positive.  No evidence of volume overload on exam. Her BP is elevated here which is likely acute on chronic. Her EKG and positive cardiac enzymes are suggestive of NSTEMI. Now, s/p balloon angioplasty after 100% stenosis second obtuse marginal artery, with 30% residual stenosis, pending cath lab today.    ==============NEURO=============  A&Ox3, no active issues    =========CARDIOVASCULAR=========    # NSTEMI  -s/p aspirin 81 mg, brillinta 180 mg, integralin gtt x18 hours post-cath  - s/p morphine 2 mg IVP x 1 for chest pain; pain/nausea control PRN  - Repeat EKG  - Trend cardiac enzymes Q6h to peak  - Cardiac cath (1/13/22) s/p balloon angioplasty to OM1 and placed on integrilin drip with plans to return to cath lab to reassess clot burden tomorrow         - considering SCAD vs microvascular disease vs CAD  - Monitor and replete lytes, keep K>4 and Mg >2  - f/u lipid panel and A1c  - f/u TTE      ============RESPIRATORY==========  - Satting well on RA, no active issues at this time    ============RENAL==============  - Cr 0.92  - trend BMP    ============GI===============  - Diet: DASH/TLC diet    ============ENDO============  - f/u HbA1C, Lipids  - monitor glucose  - Hypothyroidism: patient takes levothyroxine 150mcg at home (skipped day of admission)    ===========HEME/ONC===========  - Trend H/H  - A/C: s/p integralin gtt  - c/w aspirin, ticagrelor and atorvastatin  - DVT ppx: SQH    =============ID=============  - Stable WBC, afebrile  - observe off abx    # COVID-19  - on RA; infection discovered incidentally  - holding on treatment with dexamethasone and remdesivir    ===========Lines/Tubes/Calderón===========  - peripherals intact  - GOC: FULL CODE           51 year old female with PMH of hypothyroidism presents today with chest discomfort. Cardio consulted. Her EKG is showing Sinus rhythm with fusion complexes, ST depressions in V1-V3, TWI in I and AVl and her 1st set of troponin was elevated to 1500. She was also incidentally covid positive.  No evidence of volume overload on exam. Her BP is elevated here which is likely acute on chronic. Her EKG and positive cardiac enzymes are suggestive of NSTEMI. Now, s/p balloon angioplasty after 100% stenosis second obtuse marginal artery, with 30% residual stenosis, pending cath lab today.    ==============NEURO=============  A&Ox3, no active issues    =========CARDIOVASCULAR=========    # NSTEMI  -s/p aspirin 81 mg, brillinta 180 mg, integralin gtt x18 hours post-cath  - s/p morphine 2 mg IVP x 1 for chest pain; pain/nausea control PRN  - Repeat EKG  - Trend cardiac enzymes Q6h to peak  - Cardiac cath (1/13/22) s/p balloon angioplasty to OM1 and placed on integrilin drip with plans to return to cath lab to reassess clot burden tomorrow         - considering SCAD vs microvascular disease vs CAD  - Monitor and replete lytes, keep K>4 and Mg >2  - A1c 5.9, lipid panel wnl  - f/u TTE      ============RESPIRATORY==========  - Satting well on RA, no active issues at this time    ============RENAL==============  - Cr 0.92  - trend BMP    ============GI===============  - Diet: DASH/TLC diet    ============ENDO============  - lipids wnl, A1c 6.9  - Diabetic teaching ordered   - monitor glucose  - Hypothyroidism: patient takes levothyroxine 150mcg at home (skipped day of admission)    ===========HEME/ONC===========  - Trend H/H  - A/C: s/p integralin gtt  - c/w aspirin, ticagrelor and atorvastatin  - DVT ppx: SQH    =============ID=============  - Stable WBC, afebrile  - observe off abx    # COVID-19  - on RA; infection discovered incidentally  - holding on treatment with dexamethasone and remdesivir    ===========Lines/Tubes/Calderón===========  - peripherals intact  - GOC: FULL CODE           51 year old female with PMH of hypothyroidism presents today with chest discomfort. Cardio consulted. Her EKG is showing Sinus rhythm with fusion complexes, ST depressions in V1-V3, TWI in I and AVl and her 1st set of troponin was elevated to 1500. She was also incidentally covid positive.  No evidence of volume overload on exam. Her BP is elevated here which is likely acute on chronic. Her EKG and positive cardiac enzymes are suggestive of NSTEMI. Now, s/p balloon angioplasty after 100% stenosis second obtuse marginal artery, with 30% residual stenosis, pending cath lab today.    ==============NEURO=============  A&Ox3, no active issues    =========CARDIOVASCULAR=========    # NSTEMI  -s/p aspirin 81 mg, brillinta 180 mg, integralin gtt x18 hours post-cath  - s/p morphine 2 mg IVP x 1 for chest pain; pain/nausea control PRN  - Trend cardiac enzymes Q6h to peak  - Cardiac cath (1/13/22) s/p balloon angioplasty to OM1 and placed on integrilin drip with plan to repeat cath 1/14  - Monitor and replete lytes, keep K>4 and Mg >2  - A1c 5.9, lipid panel wnl  - f/u TTE      ============RESPIRATORY==========  - Satting well on RA, no active issues at this time    ============RENAL==============  - Cr 0.92  - trend BMP    ============GI===============  - Diet: DASH/TLC diet    ============ENDO============  - lipids wnl, A1c 6.9  - Diabetic teaching ordered   - monitor glucose  - Hypothyroidism: patient takes levothyroxine 150mcg at home (skipped day of admission)    ===========HEME/ONC===========  - Trend H/H  - A/C: s/p integralin gtt  - c/w aspirin, ticagrelor and atorvastatin  - DVT ppx: SQH    =============ID=============  - Stable WBC, afebrile  - observe off abx    # COVID-19  - on RA; infection discovered incidentally  - holding on treatment with dexamethasone and remdesivir    ===========Lines/Tubes/Calderón===========  - peripherals intact  - GOC: FULL CODE

## 2022-01-14 NOTE — CHART NOTE - NSCHARTNOTEFT_GEN_A_CORE
Removal of Radial Band    Pulses in the right upper extremity are palpable. The patient was placed in the supine position. The insertion site was identified and the band deflated per protocol. The radial band was removed slowly. Direct pressure was applied for  ___0___ minutes    Monitoring of the right wrists and both upper extremities including neuro-vascular checks and vital signs every 15 minutes x 4.    Complications: None    Kyle Munoz PA-C CICU

## 2022-01-14 NOTE — PROGRESS NOTE ADULT - ATTENDING COMMENTS
52 yo woman with HTN not on meds, hypothyroidism, fibroids, who presented with ACS to OSH s/p PCI to the OM    s/p Integrilin drip for repeat cath today  DAPT  check TTE  trend CE   DVTY ppx  H/H stable   pre DM, needs diabetic education

## 2022-01-14 NOTE — CONSULT NOTE ADULT - SUBJECTIVE AND OBJECTIVE BOX
CHIEF COMPLAINT: Patient is a 51y old  Female who presents with a chief complaint of NSTEMI (14 Jan 2022 07:04)    Patient is a 51y old  Female who presents with a chief complaint of Chest pain    HPI:  HPI: 51 year old female with PMH of hypothyroidism presents today with chest discomfort, which started at 7:30am. This morning patient woke up feeling very diaphoretic with burning sensation in throat. She then developed substernal chest pressure, 5/10 chest pain, and nausea. Denies dyspnea, PND, orthopnea, lower extremity swelling. She was brought to ED for evaluation. Prior to today. She has never had chest pain. Never had ischemic workup. She recently established care with Cardiologist Dr. Amador Gupta for HTN. She is a former smoker of 15 years.     ED Course:   VS: Afebrile, /90, HR 89, RR 16, SpO2 99% on RA  Labs: trop I 1590, , CKMB 22, COVID+, CXR possible interstitial infiltrates  hep gtt, ticagrelor 180 mg x 1   (13 Jan 2022 15:11)    Internal Hx: On integrillin gtt. Denies any  dyspnea, palpitations, PND, orthopnea, near syncope, syncope, lower extremity edema, stroke like symptoms. still with mild CP    EKG: SR with TWI in I and avl    REVIEW OF SYSTEMS:   All other review of systems are negative unless indicated above    PAST MEDICAL & SURGICAL HISTORY:  Hypothyroidism    No significant past surgical history        SOCIAL HISTORY:  No tobacco, ethanol, or drug abuse.    FAMILY HISTORY:  FH: type 2 diabetes (Mother)    FH: HTN (hypertension) (Mother)    FH: hyperlipidemia (Mother)      No family history of acute MI or sudden cardiac death.    MEDICATIONS  (STANDING):  aspirin  chewable 81 milliGRAM(s) Oral daily  atorvastatin 80 milliGRAM(s) Oral at bedtime  heparin   Injectable 5000 Unit(s) SubCutaneous every 8 hours  influenza   Vaccine 0.5 milliLiter(s) IntraMuscular once  levothyroxine 150 MICROGram(s) Oral daily  lidocaine   4% Patch 1 Patch Transdermal every 24 hours  metoprolol tartrate 12.5 milliGRAM(s) Oral every 12 hours  ticagrelor 90 milliGRAM(s) Oral every 12 hours    MEDICATIONS  (PRN):      Allergies    No Known Allergies    Intolerances        Home meds:  Home Medications:  ergocalciferol 1.25 mg (50,000 intl units) oral capsule: 1 cap(s) orally once a week (13 Jan 2022 17:56)  levothyroxine 150 mcg (0.15 mg) oral tablet: 1 tab(s) orally once a day (13 Jan 2022 17:56)        VITAL SIGNS:   Vital Signs Last 24 Hrs  T(C): 37.4 (14 Jan 2022 08:00), Max: 37.4 (14 Jan 2022 08:00)  T(F): 99.3 (14 Jan 2022 08:00), Max: 99.3 (14 Jan 2022 08:00)  HR: 91 (14 Jan 2022 10:00) (72 - 98)  BP: 115/69 (14 Jan 2022 10:00) (100/64 - 171/90)  BP(mean): 84 (14 Jan 2022 10:00) (74 - 106)  RR: 92 (14 Jan 2022 10:00) (12 - 94)  SpO2: 100% (14 Jan 2022 10:00) (92% - 100%)    I&O's Summary    13 Jan 2022 07:01  -  14 Jan 2022 07:00  --------------------------------------------------------  IN: 834 mL / OUT: 550 mL / NET: 284 mL    14 Jan 2022 07:01  -  14 Jan 2022 10:44  --------------------------------------------------------  IN: 107 mL / OUT: 0 mL / NET: 107 mL        On Exam:  TELE: SR  Constitutional: NAD, awake and alert, well-developed  HEENT: Moist Mucous Membranes, Anicteric  Pulmonary: Non-labored, breath sounds are clear bilaterally, No wheezing, rales or rhonchi  Cardiovascular: Regular, S1 and S2, No murmurs, rubs, gallops or clicks  Gastrointestinal: Bowel Sounds present, soft, nontender.   Lymph: No peripheral edema. No lymphadenopathy.  Skin: No visible rashes or ulcers.  Psych:  Mood & affect appropriate    LABS: All Labs Reviewed:                        11.4   12.90 )-----------( 317      ( 14 Jan 2022 01:23 )             36.9                         12.3   11.42 )-----------( 362      ( 13 Jan 2022 17:11 )             38.8                         12.3   6.84  )-----------( 327      ( 13 Jan 2022 12:06 )             39.9     14 Jan 2022 01:23    139    |  100    |  8      ----------------------------<  143    3.8     |  22     |  0.73   13 Jan 2022 17:11    136    |  99     |  6      ----------------------------<  132    3.7     |  20     |  0.64   13 Jan 2022 12:06    140    |  105    |  6      ----------------------------<  120    3.9     |  26     |  0.92     Ca    9.8        14 Jan 2022 01:23  Ca    9.6        13 Jan 2022 17:11  Ca    9.1        13 Jan 2022 12:06  Phos  4.1       14 Jan 2022 01:23  Phos  3.1       13 Jan 2022 17:11  Mg     2.0       14 Jan 2022 01:23  Mg     1.6       13 Jan 2022 17:11    TPro  7.6    /  Alb  4.2    /  TBili  0.7    /  DBili  x      /  AST  483    /  ALT  82     /  AlkPhos  59     14 Jan 2022 01:23  TPro  8.0    /  Alb  4.2    /  TBili  0.6    /  DBili  x      /  AST  220    /  ALT  42     /  AlkPhos  64     13 Jan 2022 17:11  TPro  8.6    /  Alb  3.6    /  TBili  0.5    /  DBili  x      /  AST  34     /  ALT  25     /  AlkPhos  63     13 Jan 2022 12:06    PT/INR - ( 13 Jan 2022 13:12 )   PT: 13.0 sec;   INR: 1.12 ratio         PTT - ( 14 Jan 2022 01:24 )  PTT:25.3 sec  CARDIAC MARKERS ( 14 Jan 2022 06:23 )  x     / x     / 4567 U/L / x     / 290.1 ng/mL  CARDIAC MARKERS ( 14 Jan 2022 01:23 )  x     / x     / 4849 U/L / x     / 372.6 ng/mL  CARDIAC MARKERS ( 13 Jan 2022 17:11 )  x     / x     / 3140 U/L / x     / 276.1 ng/mL  CARDIAC MARKERS ( 13 Jan 2022 12:06 )  x     / x     / 283 U/L / x     / 22.0 ng/mL      Blood Culture:         RADIOLOGY:    Coronary Angiography   The coronary circulation is right dominant.      LM   Left main artery: The segment is visually normal in size and  structure.    LAD   Left anterior descending artery: The segment is visually normal in  size and structure.    CX   First obtuse marginal: Thrombosis is present. There is a 100 %  stenosis. MABEL Flow 0. Second obtuse marginal: Thrombosis is  present. There is a 100 % stenosis.      RCA   Right coronary artery: The segment is visually normal in size and  structure. 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

## 2022-01-14 NOTE — CHART NOTE - NSCHARTNOTEFT_GEN_A_CORE
MAR Accept Note  Transfer to: Medicine  Accepting Attending Physician: Dr. Atkins     Assigned Room: 4MON 415    HPI/CICU COURSE:   Please refer to CICU transfer note for full details. Briefly, this is a 51 year old female with PMH of hypothyroidism presents today with chest discomfort, which started at 7:30am. This morning patient woke up feeling very diaphoretic with burning sensation in throat. She then developed substernal chest pressure, 5/10 chest pain, and nausea. Denies dyspnea, PND, orthopnea, lower extremity swelling. She was brought to ED for evaluation. Prior to today. She has never had chest pain. Never had ischemic workup. She recently established care with Cardiologist Dr. Amador Gupta for HTN. She is a former smoker of 15 years. In the ED, patient was afebrile, /90, HR 89, RR 16, SpO2 99% on RA. Trop 1590, , CKMB 22, COVID+, CXR possible interstitial infiltrates. She was placed on an integrilin gtt@ 19cc/hr and was started on aspirin and loaded with ticagrelor 180 mg x 1 and placed on standing ticagrelor 90 mg BID. She did not receive treatment for COVID-19 since she remained asymptomatic and was saturating well on RA.     Lopressor 12.5 mg PO BID was started overnight.  Integrilin gtt was stopped at 10am today. The angiogram was performed yesterday and 100% thrombosis seen with MABEL flow 0 in the second obtuse marginal artery. Balloon angioplasty was performed and there was 30% residual stenosis after intervention. Today, patient had another angiogram and the interventionalist found 100% stenosis in the first and second obtuse marginal artery. Ticagrelor was stopped and aspirin was continued. ASCVD 1% and statin was stopped. She is HD stable and ready to be transferred to the floors.      FOR FOLLOW-UP:  [] monitor on telemetry  [] cardiology recommendations  [] pain management  [] TTE performed - f/u final read in the AM     Dolores Ross MD  PGY-3, Internal Medicine  MAR Spectra: [id number]

## 2022-01-15 DIAGNOSIS — Z02.9 ENCOUNTER FOR ADMINISTRATIVE EXAMINATIONS, UNSPECIFIED: ICD-10-CM

## 2022-01-15 DIAGNOSIS — E03.9 HYPOTHYROIDISM, UNSPECIFIED: ICD-10-CM

## 2022-01-15 DIAGNOSIS — I21.4 NON-ST ELEVATION (NSTEMI) MYOCARDIAL INFARCTION: ICD-10-CM

## 2022-01-15 DIAGNOSIS — U07.1 COVID-19: ICD-10-CM

## 2022-01-15 LAB
ALBUMIN SERPL ELPH-MCNC: 3.3 G/DL — SIGNIFICANT CHANGE UP (ref 3.3–5)
ALP SERPL-CCNC: 73 U/L — SIGNIFICANT CHANGE UP (ref 40–120)
ALT FLD-CCNC: 60 U/L — HIGH (ref 10–45)
ANION GAP SERPL CALC-SCNC: 15 MMOL/L — SIGNIFICANT CHANGE UP (ref 5–17)
AST SERPL-CCNC: 186 U/L — HIGH (ref 10–40)
BILIRUB SERPL-MCNC: 1.2 MG/DL — SIGNIFICANT CHANGE UP (ref 0.2–1.2)
BUN SERPL-MCNC: 9 MG/DL — SIGNIFICANT CHANGE UP (ref 7–23)
CALCIUM SERPL-MCNC: 8.9 MG/DL — SIGNIFICANT CHANGE UP (ref 8.4–10.5)
CHLORIDE SERPL-SCNC: 99 MMOL/L — SIGNIFICANT CHANGE UP (ref 96–108)
CO2 SERPL-SCNC: 16 MMOL/L — LOW (ref 22–31)
CREAT SERPL-MCNC: 0.74 MG/DL — SIGNIFICANT CHANGE UP (ref 0.5–1.3)
GLUCOSE BLDC GLUCOMTR-MCNC: 125 MG/DL — HIGH (ref 70–99)
GLUCOSE SERPL-MCNC: 103 MG/DL — HIGH (ref 70–99)
HCT VFR BLD CALC: 33.4 % — LOW (ref 34.5–45)
HGB BLD-MCNC: 10.4 G/DL — LOW (ref 11.5–15.5)
MAGNESIUM SERPL-MCNC: 1.9 MG/DL — SIGNIFICANT CHANGE UP (ref 1.6–2.6)
MCHC RBC-ENTMCNC: 26.9 PG — LOW (ref 27–34)
MCHC RBC-ENTMCNC: 31.1 GM/DL — LOW (ref 32–36)
MCV RBC AUTO: 86.3 FL — SIGNIFICANT CHANGE UP (ref 80–100)
NRBC # BLD: 0 /100 WBCS — SIGNIFICANT CHANGE UP (ref 0–0)
PHOSPHATE SERPL-MCNC: 3.4 MG/DL — SIGNIFICANT CHANGE UP (ref 2.5–4.5)
PLATELET # BLD AUTO: 259 K/UL — SIGNIFICANT CHANGE UP (ref 150–400)
POTASSIUM SERPL-MCNC: 4.7 MMOL/L — SIGNIFICANT CHANGE UP (ref 3.5–5.3)
POTASSIUM SERPL-SCNC: 4.7 MMOL/L — SIGNIFICANT CHANGE UP (ref 3.5–5.3)
PROT SERPL-MCNC: 7.5 G/DL — SIGNIFICANT CHANGE UP (ref 6–8.3)
RBC # BLD: 3.87 M/UL — SIGNIFICANT CHANGE UP (ref 3.8–5.2)
RBC # FLD: 14.8 % — HIGH (ref 10.3–14.5)
SODIUM SERPL-SCNC: 130 MMOL/L — LOW (ref 135–145)
WBC # BLD: 15.87 K/UL — HIGH (ref 3.8–10.5)
WBC # FLD AUTO: 15.87 K/UL — HIGH (ref 3.8–10.5)

## 2022-01-15 PROCEDURE — 99232 SBSQ HOSP IP/OBS MODERATE 35: CPT

## 2022-01-15 RX ORDER — ACETAMINOPHEN 500 MG
650 TABLET ORAL ONCE
Refills: 0 | Status: COMPLETED | OUTPATIENT
Start: 2022-01-15 | End: 2022-01-15

## 2022-01-15 RX ADMIN — Medication 150 MICROGRAM(S): at 05:14

## 2022-01-15 RX ADMIN — HEPARIN SODIUM 5000 UNIT(S): 5000 INJECTION INTRAVENOUS; SUBCUTANEOUS at 13:45

## 2022-01-15 RX ADMIN — LIDOCAINE 1 PATCH: 4 CREAM TOPICAL at 22:45

## 2022-01-15 RX ADMIN — HEPARIN SODIUM 5000 UNIT(S): 5000 INJECTION INTRAVENOUS; SUBCUTANEOUS at 05:14

## 2022-01-15 RX ADMIN — Medication 650 MILLIGRAM(S): at 21:45

## 2022-01-15 RX ADMIN — Medication 25 MILLIGRAM(S): at 05:14

## 2022-01-15 RX ADMIN — HEPARIN SODIUM 5000 UNIT(S): 5000 INJECTION INTRAVENOUS; SUBCUTANEOUS at 20:37

## 2022-01-15 RX ADMIN — Medication 81 MILLIGRAM(S): at 13:45

## 2022-01-15 RX ADMIN — Medication 650 MILLIGRAM(S): at 20:54

## 2022-01-15 NOTE — PROGRESS NOTE ADULT - SUBJECTIVE AND OBJECTIVE BOX
Eva Segal  Internal Medicine   [employer] or [phone]    Patient is a 51y old  Female who presents with a chief complaint of NSTEMI (15 Rashi 2022 10:39)      SUBJECTIVE / OVERNIGHT EVENTS: Patient seen and examined at bedside. Patient denies SOB, N/V, or diaphoresis. She states that her chest pain is significantly improved since her rpesentation, but she still notices it at the top of her chest. Has been ambulating, has good appetite. On tele, noted to be sinus w/ HR  overnight    ADDITIONAL REVIEW OF SYSTEMS:    MEDICATIONS  (STANDING):  aspirin  chewable 81 milliGRAM(s) Oral daily  heparin   Injectable 5000 Unit(s) SubCutaneous every 8 hours  influenza   Vaccine 0.5 milliLiter(s) IntraMuscular once  levothyroxine 150 MICROGram(s) Oral daily  lidocaine   4% Patch 1 Patch Transdermal every 24 hours  metoprolol succinate ER 25 milliGRAM(s) Oral daily    MEDICATIONS  (PRN):      CAPILLARY BLOOD GLUCOSE        I&O's Summary    14 Jan 2022 07:01  -  15 Rashi 2022 07:00  --------------------------------------------------------  IN: 427 mL / OUT: 400 mL / NET: 27 mL        PHYSICAL EXAM:    Vital Signs Last 24 Hrs  T(C): 37.4 (15 Rashi 2022 11:41), Max: 37.7 (14 Jan 2022 19:00)  T(F): 99.4 (15 Rashi 2022 11:41), Max: 99.8 (14 Jan 2022 19:00)  HR: 99 (15 Rashi 2022 11:41) (87 - 104)  BP: 104/67 (15 Rashi 2022 11:41) (99/68 - 137/80)  BP(mean): 82 (14 Jan 2022 23:00) (77 - 90)  RR: 18 (15 Rashi 2022 11:41) (17 - 27)  SpO2: 98% (15 Rashi 2022 11:41) (96% - 100%)    CONSTITUTIONAL: NAD, well-developed, well-groomed  EYES: Conjunctiva and sclera clear  ENMT: Moist oral mucosa, no pharyngeal injection or exudates; normal dentition  NECK: Supple, no palpable masses; no thyromegaly  RESPIRATORY: Normal respiratory effort; lungs are clear to auscultation bilaterally  CARDIOVASCULAR: Regular rate and rhythm, normal S1 and S2, no murmur/rub/gallop; No lower extremity edema; Peripheral pulses are 2+ bilaterally  ABDOMEN: Nontender to palpation, normoactive bowel sounds, no rebound/guarding  MUSCULOSKELETAL: No clubbing or cyanosis of digits; no joint swelling or tenderness to palpation  PSYCH: A+O to person, place, and time; affect appropriate  NEUROLOGY: CN 2-12 are intact and symmetric; no gross sensory deficits   SKIN: No rashes; no palpable lesions    LABS:                        10.4   15.87 )-----------( 259      ( 15 Rashi 2022 05:51 )             33.4     01-15    130<L>  |  99  |  9   ----------------------------<  103<H>  4.7   |  16<L>  |  0.74    Ca    8.9      15 Rashi 2022 05:51  Phos  3.4     01-15  Mg     1.9     01-15    TPro  7.5  /  Alb  3.3  /  TBili  1.2  /  DBili  x   /  AST  186<H>  /  ALT  60<H>  /  AlkPhos  73  01-15    PTT - ( 14 Jan 2022 01:24 )  PTT:25.3 sec  CARDIAC MARKERS ( 14 Jan 2022 06:23 )  x     / x     / 4567 U/L / x     / 290.1 ng/mL  CARDIAC MARKERS ( 14 Jan 2022 01:23 )  x     / x     / 4849 U/L / x     / 372.6 ng/mL  CARDIAC MARKERS ( 13 Jan 2022 17:11 )  x     / x     / 3140 U/L / x     / 276.1 ng/mL            RADIOLOGY & ADDITIONAL TESTS: No new imaging  Results Reviewed: Yes  Imaging Personally Reviewed:  Electrocardiogram Personally Reviewed:    COORDINATION OF CARE:  Care Discussed with Consultants/Other Providers [Y/N]:  Prior or Outpatient Records Reviewed [Y/N]:

## 2022-01-15 NOTE — PROGRESS NOTE ADULT - PROBLEM SELECTOR PLAN 1
COVID + on 1/13  -no respiratory symptoms P/W CP, elevated trops concerning for NSTEMI; s/p cardiac cath 1/13 s/p balloon angioplasty to OM1 and integrillin gtt, staged PCI 1/14 w/ clean coronaries; concern for SCAD  -CE peaked on 1/14  -start lipitor 10 qhs  -c/w toprol xL 25 daily   -Cardiology following; appreciate recs  - Monitor and replete lytes, keep K>4 and Mg >2  - A1c 5.9, lipid panel wnl  - f/u TTE

## 2022-01-15 NOTE — PROGRESS NOTE ADULT - ASSESSMENT
51 year old female with PMH of hypothyroidism presented today with chest discomfort. Cardio consulted. Her EKG is showing Sinus rhythm with fusion complexes, ST depressions in V1-V3, TWI in I and AVl and her 1st set of troponin was elevated to 1500. She was also incidentally covid positive.  No evidence of volume overload on exam. Her BP is elevated here which is likely acute on chronic. Her EKG and positive cardiac enzymes are suggestive of NSTEMI. Now, s/p balloon angioplasty after 100% stenosis second obtuse marginal artery, with 30% residual stenosis, pending cath lab today.

## 2022-01-15 NOTE — PROGRESS NOTE ADULT - ASSESSMENT
51 year old female with PMH of hypothyroidism p/w NSTEMI with thrombotic lesion in OM1 s/p POBA    - Integrillin gtt now off  - Repeat angio shows distal OM lesions likely related to SCAD  - Continue aspirin  - start lipitor 10 qhs for non obs CAD  - Continue Toprol  - check echo   - eventual ace/arb as bp tolerated  - Appears compensated from HF POV.   - COVID +. no signs of hypoxia. Supplemental o2 as needed.     - Further cardiac workup will depend on clinical course.

## 2022-01-15 NOTE — PROGRESS NOTE ADULT - PROBLEM SELECTOR PLAN 2
COVID + on 1/13  -no respiratory symptoms, saturating well on RA  -not a candidate for monoclonal ab

## 2022-01-15 NOTE — PROGRESS NOTE ADULT - PROBLEM SELECTOR PLAN 4
P/W CP, elevated trops concerning for NSTEMI; s/p cardiac cath 1/13 s/p balloon angioplasty to OM1 and integrillin gtt, staged PCI 1/14 w/ clean coronaries; concern for SCAD  -CE peaked on 1/14  -start lipitor 10 qhs  -Cardiology following; appreciate recs  - Monitor and replete lytes, keep K>4 and Mg >2  - A1c 5.9, lipid panel wnl  - f/u TTE DVT: HSQ q8h   -Dispo: pending TTE

## 2022-01-15 NOTE — PROGRESS NOTE ADULT - SUBJECTIVE AND OBJECTIVE BOX
[facility] - Liz Dockery, Danielle, Albert, Radha, Frankie Pantoja  Office Number:  [phone]    Patient resting comfortably in bed in NAD.  Laying flat with no respiratory distress.  No complaints of chest pain, dyspnea, palpitations, PND, or orthopnea.    F/U for:  SCAD    Telemetry:  Sr. NO VT    MEDICATIONS  (STANDING):  aspirin  chewable 81 milliGRAM(s) Oral daily  heparin   Injectable 5000 Unit(s) SubCutaneous every 8 hours  influenza   Vaccine 0.5 milliLiter(s) IntraMuscular once  levothyroxine 150 MICROGram(s) Oral daily  lidocaine   4% Patch 1 Patch Transdermal every 24 hours  metoprolol succinate ER 25 milliGRAM(s) Oral daily        Allergies    No Known Allergies          Vital Signs Last 24 Hrs  T(C): 37.3 (15 Rashi 2022 04:30), Max: 37.7 (14 Jan 2022 19:00)  T(F): 99.2 (15 Rashi 2022 04:30), Max: 99.8 (14 Jan 2022 19:00)  HR: 102 (15 Rashi 2022 04:30) (84 - 104)  BP: 114/74 (15 Rashi 2022 04:30) (99/68 - 137/80)  BP(mean): 82 (14 Jan 2022 23:00) (77 - 91)  RR: 18 (15 Rashi 2022 04:30) (17 - 27)  SpO2: 97% (15 Rashi 2022 04:30) (96% - 100%)    I&O's Summary    14 Jan 2022 07:01  -  15 Rashi 2022 07:00  --------------------------------------------------------  IN: 427 mL / OUT: 400 mL / NET: 27 mL        ON EXAM:    PE deferred secondary to COVID    LABS: All Labs Reviewed:                        10.4   15.87 )-----------( 259      ( 15 Rashi 2022 05:51 )             33.4                         11.4   12.90 )-----------( 317      ( 14 Jan 2022 01:23 )             36.9                         12.3   11.42 )-----------( 362      ( 13 Jan 2022 17:11 )             38.8     15 Jan 2022 05:51    130    |  99     |  9      ----------------------------<  103    4.7     |  16     |  0.74   14 Jan 2022 01:23    139    |  100    |  8      ----------------------------<  143    3.8     |  22     |  0.73   13 Jan 2022 17:11    136    |  99     |  6      ----------------------------<  132    3.7     |  20     |  0.64     Ca    8.9        15 Jan 2022 05:51  Ca    9.8        14 Jan 2022 01:23  Ca    9.6        13 Jan 2022 17:11  Phos  3.4       15 Rashi 2022 05:51  Phos  4.1       14 Jan 2022 01:23  Phos  3.1       13 Jan 2022 17:11  Mg     1.9       15 Jan 2022 05:51  Mg     2.0       14 Jan 2022 01:23  Mg     1.6       13 Jan 2022 17:11    TPro  7.5    /  Alb  3.3    /  TBili  1.2    /  DBili  x      /  AST  186    /  ALT  60     /  AlkPhos  73     15 Rashi 2022 05:51  TPro  7.6    /  Alb  4.2    /  TBili  0.7    /  DBili  x      /  AST  483    /  ALT  82     /  AlkPhos  59     14 Jan 2022 01:23  TPro  8.0    /  Alb  4.2    /  TBili  0.6    /  DBili  x      /  AST  220    /  ALT  42     /  AlkPhos  64     13 Jan 2022 17:11    PT/INR - ( 13 Jan 2022 13:12 )   PT: 13.0 sec;   INR: 1.12 ratio         PTT - ( 14 Jan 2022 01:24 )  PTT:25.3 sec  CARDIAC MARKERS ( 14 Jan 2022 06:23 )  x     / x     / 4567 U/L / x     / 290.1 ng/mL  CARDIAC MARKERS ( 14 Jan 2022 01:23 )  x     / x     / 4849 U/L / x     / 372.6 ng/mL  CARDIAC MARKERS ( 13 Jan 2022 17:11 )  x     / x     / 3140 U/L / x     / 276.1 ng/mL  CARDIAC MARKERS ( 13 Jan 2022 12:06 )  x     / x     / 283 U/L / x     / 22.0 ng/mL      Blood Culture:     01-14 @ 12:36  TSH: 0.27

## 2022-01-16 DIAGNOSIS — D72.829 ELEVATED WHITE BLOOD CELL COUNT, UNSPECIFIED: ICD-10-CM

## 2022-01-16 LAB
ANION GAP SERPL CALC-SCNC: 14 MMOL/L — SIGNIFICANT CHANGE UP (ref 5–17)
BUN SERPL-MCNC: 10 MG/DL — SIGNIFICANT CHANGE UP (ref 7–23)
CALCIUM SERPL-MCNC: 8.9 MG/DL — SIGNIFICANT CHANGE UP (ref 8.4–10.5)
CHLORIDE SERPL-SCNC: 96 MMOL/L — SIGNIFICANT CHANGE UP (ref 96–108)
CK MB BLD-MCNC: 1 % — SIGNIFICANT CHANGE UP (ref 0–3.5)
CK MB CFR SERPL CALC: 7 NG/ML — HIGH (ref 0–3.8)
CK SERPL-CCNC: 691 U/L — HIGH (ref 25–170)
CO2 SERPL-SCNC: 26 MMOL/L — SIGNIFICANT CHANGE UP (ref 22–31)
CREAT SERPL-MCNC: 0.94 MG/DL — SIGNIFICANT CHANGE UP (ref 0.5–1.3)
GLUCOSE SERPL-MCNC: 119 MG/DL — HIGH (ref 70–99)
HCT VFR BLD CALC: 29.8 % — LOW (ref 34.5–45)
HGB BLD-MCNC: 9.4 G/DL — LOW (ref 11.5–15.5)
MAGNESIUM SERPL-MCNC: 2 MG/DL — SIGNIFICANT CHANGE UP (ref 1.6–2.6)
MCHC RBC-ENTMCNC: 26.8 PG — LOW (ref 27–34)
MCHC RBC-ENTMCNC: 31.5 GM/DL — LOW (ref 32–36)
MCV RBC AUTO: 84.9 FL — SIGNIFICANT CHANGE UP (ref 80–100)
NRBC # BLD: 0 /100 WBCS — SIGNIFICANT CHANGE UP (ref 0–0)
PLATELET # BLD AUTO: 252 K/UL — SIGNIFICANT CHANGE UP (ref 150–400)
POTASSIUM SERPL-MCNC: 3.8 MMOL/L — SIGNIFICANT CHANGE UP (ref 3.5–5.3)
POTASSIUM SERPL-SCNC: 3.8 MMOL/L — SIGNIFICANT CHANGE UP (ref 3.5–5.3)
RBC # BLD: 3.51 M/UL — LOW (ref 3.8–5.2)
RBC # FLD: 14.8 % — HIGH (ref 10.3–14.5)
SODIUM SERPL-SCNC: 136 MMOL/L — SIGNIFICANT CHANGE UP (ref 135–145)
TROPONIN T, HIGH SENSITIVITY RESULT: 2253 NG/L — HIGH (ref 0–51)
WBC # BLD: 16.41 K/UL — HIGH (ref 3.8–10.5)
WBC # FLD AUTO: 16.41 K/UL — HIGH (ref 3.8–10.5)

## 2022-01-16 PROCEDURE — 93010 ELECTROCARDIOGRAM REPORT: CPT

## 2022-01-16 PROCEDURE — 73000 X-RAY EXAM OF COLLAR BONE: CPT | Mod: 26,LT

## 2022-01-16 PROCEDURE — 99232 SBSQ HOSP IP/OBS MODERATE 35: CPT

## 2022-01-16 PROCEDURE — 73030 X-RAY EXAM OF SHOULDER: CPT | Mod: 26,LT

## 2022-01-16 RX ORDER — ACETAMINOPHEN 500 MG
650 TABLET ORAL EVERY 6 HOURS
Refills: 0 | Status: DISCONTINUED | OUTPATIENT
Start: 2022-01-16 | End: 2022-01-17

## 2022-01-16 RX ORDER — ATORVASTATIN CALCIUM 80 MG/1
10 TABLET, FILM COATED ORAL AT BEDTIME
Refills: 0 | Status: DISCONTINUED | OUTPATIENT
Start: 2022-01-16 | End: 2022-01-17

## 2022-01-16 RX ORDER — LISINOPRIL 2.5 MG/1
2.5 TABLET ORAL ONCE
Refills: 0 | Status: COMPLETED | OUTPATIENT
Start: 2022-01-16 | End: 2022-01-16

## 2022-01-16 RX ORDER — LISINOPRIL 2.5 MG/1
2.5 TABLET ORAL DAILY
Refills: 0 | Status: DISCONTINUED | OUTPATIENT
Start: 2022-01-16 | End: 2022-01-17

## 2022-01-16 RX ADMIN — HEPARIN SODIUM 5000 UNIT(S): 5000 INJECTION INTRAVENOUS; SUBCUTANEOUS at 13:07

## 2022-01-16 RX ADMIN — ATORVASTATIN CALCIUM 10 MILLIGRAM(S): 80 TABLET, FILM COATED ORAL at 22:16

## 2022-01-16 RX ADMIN — Medication 150 MICROGRAM(S): at 05:48

## 2022-01-16 RX ADMIN — HEPARIN SODIUM 5000 UNIT(S): 5000 INJECTION INTRAVENOUS; SUBCUTANEOUS at 05:48

## 2022-01-16 RX ADMIN — Medication 650 MILLIGRAM(S): at 10:00

## 2022-01-16 RX ADMIN — Medication 81 MILLIGRAM(S): at 11:33

## 2022-01-16 RX ADMIN — Medication 650 MILLIGRAM(S): at 09:33

## 2022-01-16 RX ADMIN — LIDOCAINE 1 PATCH: 4 CREAM TOPICAL at 10:07

## 2022-01-16 RX ADMIN — HEPARIN SODIUM 5000 UNIT(S): 5000 INJECTION INTRAVENOUS; SUBCUTANEOUS at 22:16

## 2022-01-16 RX ADMIN — LISINOPRIL 2.5 MILLIGRAM(S): 2.5 TABLET ORAL at 11:33

## 2022-01-16 RX ADMIN — Medication 25 MILLIGRAM(S): at 05:48

## 2022-01-16 RX ADMIN — LIDOCAINE 1 PATCH: 4 CREAM TOPICAL at 07:20

## 2022-01-16 NOTE — PROGRESS NOTE ADULT - PROBLEM SELECTOR PLAN 2
COVID + on 1/13  -no respiratory symptoms, saturating well on RA  -not a candidate for monoclonal ab P/W CP, elevated trops concerning for NSTEMI; s/p cardiac cath 1/13 s/p balloon angioplasty to OM1 and integrillin gtt, staged PCI 1/14 w/ clean coronaries; concern for SCAD  -CE peaked on 1/14  -c/w lipitor 10 qhs  -c/w toprol xL 25 daily   -started on lisinopril 2.5 daily   -Cardiology following; appreciate recs  - Monitor and replete lytes, keep K>4 and Mg >2  - A1c 5.9, lipid panel wnl  - TTE 1/14: Reveals EF 45-50%, Mild segmental left ventricular systolic dysfunction. The basal to mid anterolateral wall and the basal to mid inferolateral wall are akinetic; borderline aneurysmal.

## 2022-01-16 NOTE — PROGRESS NOTE ADULT - SUBJECTIVE AND OBJECTIVE BOX
[facility] - Liz Dockery, Danielle, Albert, Radha, Frankie Pantoja  Office Number:  [phone]    Patient resting comfortably in bed in NAD.  Laying flat with no respiratory distress.  No complaints of chest pain, dyspnea, palpitations, PND, or orthopnea.  Had chest pain when pulling herself up in bed.     F/U for:  SCAD    Telemetry:  SR. NO events.     MEDICATIONS  (STANDING):  aspirin  chewable 81 milliGRAM(s) Oral daily  atorvastatin 10 milliGRAM(s) Oral at bedtime  heparin   Injectable 5000 Unit(s) SubCutaneous every 8 hours  influenza   Vaccine 0.5 milliLiter(s) IntraMuscular once  levothyroxine 150 MICROGram(s) Oral daily  lidocaine   4% Patch 1 Patch Transdermal every 24 hours  lisinopril 2.5 milliGRAM(s) Oral daily  lisinopril 2.5 milliGRAM(s) Oral once  metoprolol succinate ER 25 milliGRAM(s) Oral daily    MEDICATIONS  (PRN):  acetaminophen     Tablet .. 650 milliGRAM(s) Oral every 6 hours PRN Moderate Pain (4 - 6)      Allergies    No Known Allergies          Vital Signs Last 24 Hrs  T(C): 37.1 (16 Jan 2022 04:30), Max: 37.9 (15 Rashi 2022 20:16)  T(F): 98.7 (16 Jan 2022 04:30), Max: 100.2 (15 Rashi 2022 20:16)  HR: 92 (16 Jan 2022 04:30) (92 - 101)  BP: 112/74 (16 Jan 2022 04:30) (91/64 - 114/78)  BP(mean): --  RR: 18 (16 Jan 2022 04:30) (18 - 19)  SpO2: 99% (16 Jan 2022 04:30) (98% - 100%)    I&O's Summary    15 Rashi 2022 07:01  -  16 Jan 2022 07:00  --------------------------------------------------------  IN: 942 mL / OUT: 0 mL / NET: 942 mL    16 Jan 2022 07:01  -  16 Jan 2022 10:57  --------------------------------------------------------  IN: 120 mL / OUT: 0 mL / NET: 120 mL        ON EXAM:    PE deferred secondary to COVID    LABS: All Labs Reviewed:                        9.4    16.41 )-----------( 252      ( 16 Jan 2022 02:56 )             29.8                         10.4   15.87 )-----------( 259      ( 15 Rashi 2022 05:51 )             33.4                         11.4   12.90 )-----------( 317      ( 14 Jan 2022 01:23 )             36.9     16 Jan 2022 02:58    136    |  96     |  10     ----------------------------<  119    3.8     |  26     |  0.94   15 Rashi 2022 05:51    130    |  99     |  9      ----------------------------<  103    4.7     |  16     |  0.74   14 Jan 2022 01:23    139    |  100    |  8      ----------------------------<  143    3.8     |  22     |  0.73     Ca    8.9        16 Jan 2022 02:58  Ca    8.9        15 Rashi 2022 05:51  Ca    9.8        14 Jan 2022 01:23  Phos  3.4       15 Rashi 2022 05:51  Phos  4.1       14 Jan 2022 01:23  Phos  3.1       13 Jan 2022 17:11  Mg     2.0       16 Jan 2022 02:58  Mg     1.9       15 Rashi 2022 05:51  Mg     2.0       14 Jan 2022 01:23    TPro  7.5    /  Alb  3.3    /  TBili  1.2    /  DBili  x      /  AST  186    /  ALT  60     /  AlkPhos  73     15 Rashi 2022 05:51  TPro  7.6    /  Alb  4.2    /  TBili  0.7    /  DBili  x      /  AST  483    /  ALT  82     /  AlkPhos  59     14 Jan 2022 01:23  TPro  8.0    /  Alb  4.2    /  TBili  0.6    /  DBili  x      /  AST  220    /  ALT  42     /  AlkPhos  64     13 Jan 2022 17:11      CARDIAC MARKERS ( 16 Jan 2022 02:58 )  x     / x     / 691 U/L / x     / 7.0 ng/mL      Blood Culture:     01-14 @ 12:36  TSH: 0.27

## 2022-01-16 NOTE — PROGRESS NOTE ADULT - PROBLEM SELECTOR PLAN 3
PMH hypothyroidism  -c/w synthroid 150 mcg daily COVID + on 1/13  -no respiratory symptoms, saturating well on RA  -not a candidate for monoclonal ab

## 2022-01-16 NOTE — PROGRESS NOTE ADULT - PROBLEM SELECTOR PLAN 1
P/W CP, elevated trops concerning for NSTEMI; s/p cardiac cath 1/13 s/p balloon angioplasty to OM1 and integrillin gtt, staged PCI 1/14 w/ clean coronaries; concern for SCAD  -CE peaked on 1/14  -start lipitor 10 qhs  -c/w toprol xL 25 daily   -Cardiology following; appreciate recs  - Monitor and replete lytes, keep K>4 and Mg >2  - A1c 5.9, lipid panel wnl  - TTE 1/14: Reveals EF 45-50%, Mild segmental left ventricular systolic dysfunction. The basal to mid anterolateral wall and the basal to mid inferolateral wall are akinetic; borderline aneurysmal. Noted to have uptrending leukocytosis on labs  -today WBC 16.4 from 6.84 on 1/13; will trend  -likely reactive leukocytosis in the setting of NSTEMI and COVID 19 infection  -clinically appears non-toxic, remains afebrile, no localizing symptoms that can point to an infection   -Regarding COVID 19, remains on RA and CXR clear from 1/13

## 2022-01-16 NOTE — PROVIDER CONTACT NOTE (OTHER) - SITUATION
patient complaint of L shoulder pain; pain occurred when patient was pulling herself up in the bed
patient complaint of chest pain

## 2022-01-16 NOTE — PROGRESS NOTE ADULT - SUBJECTIVE AND OBJECTIVE BOX
Eva Philipp  Internal Medicine   Pager # [phone]    *THIS NOTE IS INCOMPLETE**    Patient is a 51y old  Female who presents with a chief complaint of NSTEMI (15 Rashi 2022 14:28)      SUBJECTIVE / OVERNIGHT EVENTS: Overnight, patient had complained of L sided supraclavicular area.   Patient seen and examined at bedside.     ADDITIONAL REVIEW OF SYSTEMS:    MEDICATIONS  (STANDING):  aspirin  chewable 81 milliGRAM(s) Oral daily  atorvastatin 10 milliGRAM(s) Oral at bedtime  heparin   Injectable 5000 Unit(s) SubCutaneous every 8 hours  influenza   Vaccine 0.5 milliLiter(s) IntraMuscular once  levothyroxine 150 MICROGram(s) Oral daily  lidocaine   4% Patch 1 Patch Transdermal every 24 hours  lisinopril 2.5 milliGRAM(s) Oral daily  metoprolol succinate ER 25 milliGRAM(s) Oral daily    MEDICATIONS  (PRN):      CAPILLARY BLOOD GLUCOSE      POCT Blood Glucose.: 125 mg/dL (15 Arshi 2022 16:32)    I&O's Summary    15 Rashi 2022 07:01  -  16 Jan 2022 07:00  --------------------------------------------------------  IN: 942 mL / OUT: 0 mL / NET: 942 mL    16 Jan 2022 07:01  -  16 Jan 2022 09:16  --------------------------------------------------------  IN: 120 mL / OUT: 0 mL / NET: 120 mL        PHYSICAL EXAM:    Vital Signs Last 24 Hrs  T(C): 37.1 (16 Jan 2022 04:30), Max: 37.9 (15 Rashi 2022 20:16)  T(F): 98.7 (16 Jan 2022 04:30), Max: 100.2 (15 Rashi 2022 20:16)  HR: 92 (16 Jan 2022 04:30) (92 - 101)  BP: 112/74 (16 Jan 2022 04:30) (91/64 - 114/78)  BP(mean): --  RR: 18 (16 Jan 2022 04:30) (18 - 19)  SpO2: 99% (16 Jan 2022 04:30) (98% - 100%)    CONSTITUTIONAL: NAD, well-developed, well-groomed  EYES: Conjunctiva and sclera clear  ENMT: Moist oral mucosa, no pharyngeal injection or exudates; normal dentition  NECK: Supple, no palpable masses; no thyromegaly  RESPIRATORY: Normal respiratory effort; lungs are clear to auscultation bilaterally  CARDIOVASCULAR: Regular rate and rhythm, normal S1 and S2, no murmur/rub/gallop; No lower extremity edema; Peripheral pulses are 2+ bilaterally  ABDOMEN: Nontender to palpation, normoactive bowel sounds, no rebound/guarding  MUSCULOSKELETAL: No clubbing or cyanosis of digits; no joint swelling or tenderness to palpation  PSYCH: A+O to person, place, and time; affect appropriate  NEUROLOGY: CN 2-12 are intact and symmetric; no gross sensory deficits   SKIN: No rashes; no palpable lesions    LABS:                        9.4    16.41 )-----------( 252      ( 16 Jan 2022 02:56 )             29.8     01-16    136  |  96  |  10  ----------------------------<  119<H>  3.8   |  26  |  0.94    Ca    8.9      16 Jan 2022 02:58  Phos  3.4     01-15  Mg     2.0     01-16    TPro  7.5  /  Alb  3.3  /  TBili  1.2  /  DBili  x   /  AST  186<H>  /  ALT  60<H>  /  AlkPhos  73  01-15      CARDIAC MARKERS ( 16 Jan 2022 02:58 )  x     / x     / 691 U/L / x     / 7.0 ng/mL            RADIOLOGY & ADDITIONAL TESTS: No new imaging  Results Reviewed: Yes  Imaging Personally Reviewed:  Electrocardiogram Personally Reviewed:    COORDINATION OF CARE:  Care Discussed with Consultants/Other Providers [Y/N]:  Prior or Outpatient Records Reviewed [Y/N]:   Eva Segal  Internal Medicine   Pager # [phone]      Patient is a 51y old  Female who presents with a chief complaint of NSTEMI (15 Rashi 2022 14:28)      SUBJECTIVE / OVERNIGHT EVENTS: Overnight, patient had complained of L sided supraclavicular area which she describes as sharp in nature. Denies associated N/V, SOB, or radiation. She believes that it is 2/2 using her left arm to pull herself up in bed. States the pain has improved since receiving tylenol. This AM, denies CP, SOB, abd pain, or dizziness. States was unable to sleep well last night and so felt tired. On tele, remains sinus in 80s w/ some PVCs    ADDITIONAL REVIEW OF SYSTEMS:    MEDICATIONS  (STANDING):  aspirin  chewable 81 milliGRAM(s) Oral daily  atorvastatin 10 milliGRAM(s) Oral at bedtime  heparin   Injectable 5000 Unit(s) SubCutaneous every 8 hours  influenza   Vaccine 0.5 milliLiter(s) IntraMuscular once  levothyroxine 150 MICROGram(s) Oral daily  lidocaine   4% Patch 1 Patch Transdermal every 24 hours  lisinopril 2.5 milliGRAM(s) Oral daily  metoprolol succinate ER 25 milliGRAM(s) Oral daily    MEDICATIONS  (PRN):      CAPILLARY BLOOD GLUCOSE      POCT Blood Glucose.: 125 mg/dL (15 Rashi 2022 16:32)    I&O's Summary    15 Rashi 2022 07:01  -  16 Jan 2022 07:00  --------------------------------------------------------  IN: 942 mL / OUT: 0 mL / NET: 942 mL    16 Jan 2022 07:01  -  16 Jan 2022 09:16  --------------------------------------------------------  IN: 120 mL / OUT: 0 mL / NET: 120 mL        PHYSICAL EXAM:    Vital Signs Last 24 Hrs  T(C): 37.1 (16 Jan 2022 04:30), Max: 37.9 (15 Rashi 2022 20:16)  T(F): 98.7 (16 Jan 2022 04:30), Max: 100.2 (15 Rashi 2022 20:16)  HR: 92 (16 Jan 2022 04:30) (92 - 101)  BP: 112/74 (16 Jan 2022 04:30) (91/64 - 114/78)  BP(mean): --  RR: 18 (16 Jan 2022 04:30) (18 - 19)  SpO2: 99% (16 Jan 2022 04:30) (98% - 100%)    CONSTITUTIONAL: NAD, well-developed, well-groomed  EYES: Conjunctiva and sclera clear  ENMT: Moist oral mucosa, no pharyngeal injection or exudates; normal dentition  NECK: Supple, no palpable masses; no thyromegaly  RESPIRATORY: Normal respiratory effort; lungs are clear to auscultation bilaterally  CARDIOVASCULAR: Regular rate and rhythm, normal S1 and S2, no murmur/rub/gallop; No lower extremity edema; Peripheral pulses are 2+ bilaterally  ABDOMEN: Nontender to palpation, normoactive bowel sounds, no rebound/guarding  MUSCULOSKELETAL: No clubbing or cyanosis of digits; no joint swelling or tenderness to palpation  PSYCH: A+O to person, place, and time; affect appropriate  NEUROLOGY: CN 2-12 are intact and symmetric; no gross sensory deficits   SKIN: No rashes; no palpable lesions    LABS:                        9.4    16.41 )-----------( 252      ( 16 Jan 2022 02:56 )             29.8     01-16    136  |  96  |  10  ----------------------------<  119<H>  3.8   |  26  |  0.94    Ca    8.9      16 Jan 2022 02:58  Phos  3.4     01-15  Mg     2.0     01-16    TPro  7.5  /  Alb  3.3  /  TBili  1.2  /  DBili  x   /  AST  186<H>  /  ALT  60<H>  /  AlkPhos  73  01-15      CARDIAC MARKERS ( 16 Jan 2022 02:58 )  x     / x     / 691 U/L / x     / 7.0 ng/mL            RADIOLOGY & ADDITIONAL TESTS: No new imaging  Results Reviewed: Yes  Imaging Personally Reviewed:  Electrocardiogram Personally Reviewed:    COORDINATION OF CARE:  Care Discussed with Consultants/Other Providers [Y/N]:  Prior or Outpatient Records Reviewed [Y/N]:

## 2022-01-16 NOTE — PROVIDER CONTACT NOTE (OTHER) - ASSESSMENT
patient complaint of pain to left shoulder and back
patient complaint 7/10 chest pain localized to left clavicular area

## 2022-01-16 NOTE — CHART NOTE - NSCHARTNOTEFT_GEN_A_CORE
Events over night    Earlier in evening patient reports that when she attempted to pull herself up in bed, she feels as though she pulled a muscle and she began to have pain in her left shoulder area supraclavicular region aching 5/10 patient was given Tylenol with some relief. She then c/o left clavicular pain in the same region of previous pain complaint. Pain is not reproducible. Denies chest pain, sob, blurred vision. dizziness, weakness    Vital Signs Last 24 Hrs  T(C): 37.1 (16 Jan 2022 04:30), Max: 37.9 (15 Rashi 2022 20:16)  T(F): 98.7 (16 Jan 2022 04:30), Max: 100.2 (15 Rashi 2022 20:16)  HR: 92 (16 Jan 2022 04:30) (92 - 101)  BP: 112/74 (16 Jan 2022 04:30) (91/64 - 114/78)  BP(mean): --  RR: 18 (16 Jan 2022 04:30) (18 - 19)  SpO2: 99% (16 Jan 2022 04:30) (98% - 100%)    Awake alert and oriented, no acute distress sitting at bedside no acute distress   non focal deficits  resp cta no accessory muscle use speaking in full sentences  cards: rrr no edema  abdomen: obese bs x4    CARDIAC MARKERS ( 16 Jan 2022 02:58 )  x     / x     / 691 U/L / x     / 7.0 ng/mL    Troponin T, High Sensitivity (01.16.22 @ 02:58)   Troponin T, High Sensitivity Result: 2253: Specimen not hemolyzed     51 year old female with PMH of hypothyroidism presented  with chest discomfort. She was also incidentally covid positive.   NSTEMI. Now, s/p balloon angioplasty after 100% stenosis second obtuse marginal artery, with 30% residual stenosis, s/p stents. now with left supraclavicular chest pain    cardiac enzymes down trending  Least like ACS  likely musculoskeletal source      Plan   EKG no acute changes  Cardiac enzymes  Left shoulder and clavicle chest x-ray    Endorsed to primary day team, attending to follow    Cande Burns NP  spectralink [phone]

## 2022-01-16 NOTE — PROGRESS NOTE ADULT - ASSESSMENT
51 year old female with PMH of hypothyroidism p/w NSTEMI with thrombotic lesion in OM1 s/p POBA    - s/p Integrillin gtt  - Repeat angio shows distal OM lesions likely related to SCAD  - Continue aspirin  - start lipitor 10 qhs for non obs CAD  - Continue Toprol  - Echo with mild seg LV dysfunction  - start low dose ACEI  - Appears compensated from HF POV.   - COVID +. no signs of hypoxia. Supplemental o2 as needed.     - Further cardiac workup will depend on clinical course.

## 2022-01-17 ENCOUNTER — TRANSCRIPTION ENCOUNTER (OUTPATIENT)
Age: 52
End: 2022-01-17

## 2022-01-17 VITALS
SYSTOLIC BLOOD PRESSURE: 101 MMHG | HEART RATE: 90 BPM | DIASTOLIC BLOOD PRESSURE: 64 MMHG | RESPIRATION RATE: 18 BRPM | OXYGEN SATURATION: 100 %

## 2022-01-17 LAB
ANION GAP SERPL CALC-SCNC: 15 MMOL/L — SIGNIFICANT CHANGE UP (ref 5–17)
BASOPHILS # BLD AUTO: 0.03 K/UL — SIGNIFICANT CHANGE UP (ref 0–0.2)
BASOPHILS NFR BLD AUTO: 0.2 % — SIGNIFICANT CHANGE UP (ref 0–2)
BUN SERPL-MCNC: 17 MG/DL — SIGNIFICANT CHANGE UP (ref 7–23)
CALCIUM SERPL-MCNC: 9 MG/DL — SIGNIFICANT CHANGE UP (ref 8.4–10.5)
CHLORIDE SERPL-SCNC: 96 MMOL/L — SIGNIFICANT CHANGE UP (ref 96–108)
CO2 SERPL-SCNC: 22 MMOL/L — SIGNIFICANT CHANGE UP (ref 22–31)
CREAT SERPL-MCNC: 0.96 MG/DL — SIGNIFICANT CHANGE UP (ref 0.5–1.3)
EOSINOPHIL # BLD AUTO: 0.03 K/UL — SIGNIFICANT CHANGE UP (ref 0–0.5)
EOSINOPHIL NFR BLD AUTO: 0.2 % — SIGNIFICANT CHANGE UP (ref 0–6)
GLUCOSE SERPL-MCNC: 106 MG/DL — HIGH (ref 70–99)
HCT VFR BLD CALC: 28.3 % — LOW (ref 34.5–45)
HGB BLD-MCNC: 8.9 G/DL — LOW (ref 11.5–15.5)
IMM GRANULOCYTES NFR BLD AUTO: 0.6 % — SIGNIFICANT CHANGE UP (ref 0–1.5)
LYMPHOCYTES # BLD AUTO: 2.8 K/UL — SIGNIFICANT CHANGE UP (ref 1–3.3)
LYMPHOCYTES # BLD AUTO: 20.2 % — SIGNIFICANT CHANGE UP (ref 13–44)
MAGNESIUM SERPL-MCNC: 2.1 MG/DL — SIGNIFICANT CHANGE UP (ref 1.6–2.6)
MCHC RBC-ENTMCNC: 27.1 PG — SIGNIFICANT CHANGE UP (ref 27–34)
MCHC RBC-ENTMCNC: 31.4 GM/DL — LOW (ref 32–36)
MCV RBC AUTO: 86 FL — SIGNIFICANT CHANGE UP (ref 80–100)
MONOCYTES # BLD AUTO: 1.54 K/UL — HIGH (ref 0–0.9)
MONOCYTES NFR BLD AUTO: 11.1 % — SIGNIFICANT CHANGE UP (ref 2–14)
NEUTROPHILS # BLD AUTO: 9.35 K/UL — HIGH (ref 1.8–7.4)
NEUTROPHILS NFR BLD AUTO: 67.7 % — SIGNIFICANT CHANGE UP (ref 43–77)
NRBC # BLD: 0 /100 WBCS — SIGNIFICANT CHANGE UP (ref 0–0)
PHOSPHATE SERPL-MCNC: 3.7 MG/DL — SIGNIFICANT CHANGE UP (ref 2.5–4.5)
PLATELET # BLD AUTO: 262 K/UL — SIGNIFICANT CHANGE UP (ref 150–400)
POTASSIUM SERPL-MCNC: 3.3 MMOL/L — LOW (ref 3.5–5.3)
POTASSIUM SERPL-SCNC: 3.3 MMOL/L — LOW (ref 3.5–5.3)
RBC # BLD: 3.29 M/UL — LOW (ref 3.8–5.2)
RBC # FLD: 14.6 % — HIGH (ref 10.3–14.5)
SODIUM SERPL-SCNC: 133 MMOL/L — LOW (ref 135–145)
WBC # BLD: 13.83 K/UL — HIGH (ref 3.8–10.5)
WBC # FLD AUTO: 13.83 K/UL — HIGH (ref 3.8–10.5)

## 2022-01-17 PROCEDURE — 86901 BLOOD TYPING SEROLOGIC RH(D): CPT

## 2022-01-17 PROCEDURE — 84443 ASSAY THYROID STIM HORMONE: CPT

## 2022-01-17 PROCEDURE — 97161 PT EVAL LOW COMPLEX 20 MIN: CPT

## 2022-01-17 PROCEDURE — C1887: CPT

## 2022-01-17 PROCEDURE — C1725: CPT

## 2022-01-17 PROCEDURE — 80061 LIPID PANEL: CPT

## 2022-01-17 PROCEDURE — C1894: CPT

## 2022-01-17 PROCEDURE — 84100 ASSAY OF PHOSPHORUS: CPT

## 2022-01-17 PROCEDURE — 73030 X-RAY EXAM OF SHOULDER: CPT

## 2022-01-17 PROCEDURE — C8929: CPT

## 2022-01-17 PROCEDURE — 93454 CORONARY ARTERY ANGIO S&I: CPT

## 2022-01-17 PROCEDURE — 86850 RBC ANTIBODY SCREEN: CPT

## 2022-01-17 PROCEDURE — 85730 THROMBOPLASTIN TIME PARTIAL: CPT

## 2022-01-17 PROCEDURE — 82553 CREATINE MB FRACTION: CPT

## 2022-01-17 PROCEDURE — C9606: CPT | Mod: LC

## 2022-01-17 PROCEDURE — 86900 BLOOD TYPING SEROLOGIC ABO: CPT

## 2022-01-17 PROCEDURE — 83735 ASSAY OF MAGNESIUM: CPT

## 2022-01-17 PROCEDURE — 83036 HEMOGLOBIN GLYCOSYLATED A1C: CPT

## 2022-01-17 PROCEDURE — 36415 COLL VENOUS BLD VENIPUNCTURE: CPT

## 2022-01-17 PROCEDURE — C1769: CPT

## 2022-01-17 PROCEDURE — 85025 COMPLETE CBC W/AUTO DIFF WBC: CPT

## 2022-01-17 PROCEDURE — 82962 GLUCOSE BLOOD TEST: CPT

## 2022-01-17 PROCEDURE — 80048 BASIC METABOLIC PNL TOTAL CA: CPT

## 2022-01-17 PROCEDURE — 85027 COMPLETE CBC AUTOMATED: CPT

## 2022-01-17 PROCEDURE — 84484 ASSAY OF TROPONIN QUANT: CPT

## 2022-01-17 PROCEDURE — 80053 COMPREHEN METABOLIC PANEL: CPT

## 2022-01-17 PROCEDURE — 82550 ASSAY OF CK (CPK): CPT

## 2022-01-17 PROCEDURE — 93005 ELECTROCARDIOGRAM TRACING: CPT

## 2022-01-17 PROCEDURE — 99153 MOD SED SAME PHYS/QHP EA: CPT

## 2022-01-17 PROCEDURE — 99152 MOD SED SAME PHYS/QHP 5/>YRS: CPT

## 2022-01-17 PROCEDURE — C1760: CPT

## 2022-01-17 PROCEDURE — 73000 X-RAY EXAM OF COLLAR BONE: CPT

## 2022-01-17 PROCEDURE — 99239 HOSP IP/OBS DSCHRG MGMT >30: CPT

## 2022-01-17 PROCEDURE — 99232 SBSQ HOSP IP/OBS MODERATE 35: CPT

## 2022-01-17 RX ORDER — ASPIRIN/CALCIUM CARB/MAGNESIUM 324 MG
1 TABLET ORAL
Qty: 30 | Refills: 0
Start: 2022-01-17 | End: 2022-02-15

## 2022-01-17 RX ORDER — METOPROLOL TARTRATE 50 MG
1 TABLET ORAL
Qty: 30 | Refills: 0
Start: 2022-01-17 | End: 2022-02-15

## 2022-01-17 RX ORDER — POTASSIUM CHLORIDE 20 MEQ
40 PACKET (EA) ORAL ONCE
Refills: 0 | Status: COMPLETED | OUTPATIENT
Start: 2022-01-17 | End: 2022-01-17

## 2022-01-17 RX ORDER — ATORVASTATIN CALCIUM 80 MG/1
1 TABLET, FILM COATED ORAL
Qty: 30 | Refills: 0
Start: 2022-01-17 | End: 2022-02-15

## 2022-01-17 RX ORDER — LISINOPRIL 2.5 MG/1
1 TABLET ORAL
Qty: 30 | Refills: 0
Start: 2022-01-17 | End: 2022-02-15

## 2022-01-17 RX ORDER — POTASSIUM CHLORIDE 20 MEQ
40 PACKET (EA) ORAL ONCE
Refills: 0 | Status: DISCONTINUED | OUTPATIENT
Start: 2022-01-17 | End: 2022-01-17

## 2022-01-17 RX ORDER — ACETAMINOPHEN 500 MG
2 TABLET ORAL
Qty: 0 | Refills: 0 | DISCHARGE
Start: 2022-01-17

## 2022-01-17 RX ADMIN — Medication 150 MICROGRAM(S): at 05:55

## 2022-01-17 RX ADMIN — LIDOCAINE 1 PATCH: 4 CREAM TOPICAL at 05:53

## 2022-01-17 RX ADMIN — Medication 650 MILLIGRAM(S): at 02:19

## 2022-01-17 RX ADMIN — Medication 650 MILLIGRAM(S): at 02:50

## 2022-01-17 RX ADMIN — Medication 40 MILLIEQUIVALENT(S): at 06:47

## 2022-01-17 RX ADMIN — Medication 81 MILLIGRAM(S): at 11:09

## 2022-01-17 RX ADMIN — Medication 25 MILLIGRAM(S): at 11:38

## 2022-01-17 RX ADMIN — LISINOPRIL 2.5 MILLIGRAM(S): 2.5 TABLET ORAL at 11:38

## 2022-01-17 RX ADMIN — HEPARIN SODIUM 5000 UNIT(S): 5000 INJECTION INTRAVENOUS; SUBCUTANEOUS at 05:55

## 2022-01-17 RX ADMIN — Medication 40 MILLIEQUIVALENT(S): at 11:09

## 2022-01-17 NOTE — DISCHARGE NOTE NURSING/CASE MANAGEMENT/SOCIAL WORK - NSDCPEFALRISK_GEN_ALL_CORE
For information on Fall & Injury Prevention, visit: [email or web address] OR  [email or web address] OR  https://www.cdc.gov/steadi/patient.html

## 2022-01-17 NOTE — DISCHARGE NOTE NURSING/CASE MANAGEMENT/SOCIAL WORK - PATIENT PORTAL LINK FT
You can access the FollowMyHealth Patient Portal offered by [facility] by registering at the following website: [email or web address]. By joining [facility]’s FollowMyHealth portal, you will also be able to view your health information using other applications (apps) compatible with our system.

## 2022-01-17 NOTE — DISCHARGE NOTE PROVIDER - ATTENDING DISCHARGE PHYSICAL EXAMINATION:
CONSTITUTIONAL: NAD, obese  EYES: Conjunctiva and sclera clear  ENMT: Moist oral mucosa, no pharyngeal injection or exudates; normal dentition  NECK: Supple, no palpable masses; no thyromegaly  RESPIRATORY: Normal respiratory effort; lungs are clear to auscultation bilaterally  CARDIOVASCULAR: Regular rate and rhythm, normal S1 and S2, no murmur/rub/gallop; No lower extremity edema; Peripheral pulses are 2+ bilaterally  ABDOMEN: Nontender to palpation, normoactive bowel sounds, no rebound/guarding  MUSCULOSKELETAL: No clubbing or cyanosis of digits; no joint swelling or tenderness to palpation  PSYCH: A+O to person, place, and time; affect appropriate  NEUROLOGY: CN 2-12 are intact and symmetric; no gross sensory deficits   SKIN: No rashes; no palpable lesions

## 2022-01-17 NOTE — PHYSICAL THERAPY INITIAL EVALUATION ADULT - ADDITIONAL COMMENTS
lives alone lives alone in 2 family home / mother lives upstairs, pt does not use assistive device, reading glasses, hearing good, pt is NEYMAR terrell

## 2022-01-17 NOTE — PHYSICAL THERAPY INITIAL EVALUATION ADULT - PRECAUTIONS/LIMITATIONS, REHAB EVAL
who presented with ACS to OSH s/p PCI to the OM who presented with ACS to OSH s/p PCI to the OM   Now, s/p balloon angioplasty after 100% stenosis second obtuse marginal artery, with 30% residual stenosis, pending cath lab today. who presented with ACS to OSH s/p PCI to the OM   Now, s/p balloon angioplasty after 100% stenosis second obtuse marginal artery, with 30% residual stenosis, pending cath lab today. contact and droplet precautions/cardiac precautions/fall precautions/isolation precautions

## 2022-01-17 NOTE — DISCHARGE NOTE PROVIDER - NSDCCPCAREPLAN_GEN_ALL_CORE_FT
PRINCIPAL DISCHARGE DIAGNOSIS  Diagnosis: NSTEMI (non-ST elevation myocardial infarction)  Assessment and Plan of Treatment: You were diagnosed with an NSTEMI and underwent a cardiac cath on 1/13/22 with Balloon Angioplasty to OM1 followed by staged PCI on 1/14 which demonstrayed resolution of clot burden and clean coronaries. Continue aspirin 81 mg daily and medications as prescribed including beta blocker (Metoprolol), and Lipitor.   Follow up with Dr. Alonso English within 2 weeks of discharge.      SECONDARY DISCHARGE DIAGNOSES  Diagnosis: COVID-19  Assessment and Plan of Treatment: You were diagnosed with COVID-19 on 1/13. You did not recieve inpatient treatment as you remained asymptomatic without respiratory distress or oxygen supplementation. Continue with supportive care, take cough medicine as needed, Tylenol for pain or fevers, and drink plenty of fluids to keep hydrated. Follow up with your medical doctor within 2 weeks of discharge. Continue to quarantine as directed, wash your hands frequently, and wear your mask around others. If you experience worsening shortness of breath, difficulty breathing, chest pain, lower extremity pain or swelling, coughing up blood, or persistent fevers, contact your medical doctor or return to the emergency room.      Diagnosis: Leukocytosis  Assessment and Plan of Treatment: Your white blood cell count was elevated this admission,  likely in the setting of NSTEMI and COVID infection. You have remained fever free. No signs or symptoms of infection. Your XR showed no signs of pneumonia.    If you develop fever that does not respond to Tylenol and Motrin please call your doctor or return to the emergency room.    Diagnosis: Hypothyroidism  Assessment and Plan of Treatment: Continue medications as prescribed. Take your Levothyroxine (Synthroid) in the morning on an empty stomach at least 30 minutes before eating any food. Alternatively, you can take it at night3 to 4 hours after your last meal.    Diagnosis: Left shoulder pain  Assessment and Plan of Treatment: Your Shoulder Xray was negative for fractures or dislocation. It showed degenerative changes. Follow up with your primary care physician within 2 weeks of discharge.     PRINCIPAL DISCHARGE DIAGNOSIS  Diagnosis: NSTEMI (non-ST elevation myocardial infarction)  Assessment and Plan of Treatment: You were diagnosed with an NSTEMI and underwent a cardiac cath on 1/13/22 with Balloon Angioplasty to OM1 followed by staged PCI on 1/14 which demonstrayed resolution of clot burden and clean coronaries. Continue aspirin 81 mg daily and medications as prescribed including beta blocker (Metoprolol), and Lipitor.   Follow up with Dr. Alonso English within 2 weeks of discharge.      SECONDARY DISCHARGE DIAGNOSES  Diagnosis: COVID-19  Assessment and Plan of Treatment: You were diagnosed with COVID-19 on 1/13. You did not recieve inpatient treatment as you remained asymptomatic without respiratory distress or oxygen supplementation. Continue with supportive care, take cough medicine as needed, Tylenol for pain or fevers, and drink plenty of fluids to keep hydrated. Follow up with your medical doctor within 2 weeks of discharge. Continue to quarantine as directed, wash your hands frequently, and wear your mask around others. If you experience worsening shortness of breath, difficulty breathing, chest pain, lower extremity pain or swelling, coughing up blood, or persistent fevers, contact your medical doctor or return to the emergency room.      Diagnosis: Leukocytosis  Assessment and Plan of Treatment: Your white blood cell count was elevated this admission,  likely in the setting of NSTEMI and COVID infection. You have remained fever free. No signs or symptoms of infection. Your XR showed no signs of pneumonia.    If you develop fever that does not respond to Tylenol and Motrin please call your doctor or return to the emergency room.    Diagnosis: Hypothyroidism  Assessment and Plan of Treatment: Continue medications as prescribed. Take your Levothyroxine (Synthroid) in the morning on an empty stomach at least 30 minutes before eating any food. Alternatively, you can take it at night 3 to 4 hours after your last meal. Have your thyroid levels monitored by an endocrinologist or your primary care physician routinely every 3-6 months.    Diagnosis: Left shoulder pain  Assessment and Plan of Treatment: Your Shoulder Xray was negative for fractures or dislocation. It showed degenerative changes. Follow up with your primary care physician within 2 weeks of discharge.

## 2022-01-17 NOTE — PROGRESS NOTE ADULT - PROBLEM SELECTOR PLAN 2
P/W CP, elevated trops concerning for NSTEMI; s/p cardiac cath 1/13 s/p balloon angioplasty to OM1 and integrillin gtt, staged PCI 1/14 w/ clean coronaries; concern for SCAD  -CE peaked on 1/14  -c/w lipitor 10 qhs  -c/w toprol xL 25 daily   -c/w lisinopril 2.5 daily   -Cardiology following; appreciate recs  - Monitor and replete lytes, keep K>4 and Mg >2  - A1c 5.9, lipid panel wnl  - TTE 1/14: Reveals EF 45-50%, Mild segmental left ventricular systolic dysfunction. The basal to mid anterolateral wall and the basal to mid inferolateral wall are akinetic; borderline aneurysmal.

## 2022-01-17 NOTE — PROGRESS NOTE ADULT - PROVIDER SPECIALTY LIST ADULT
SUJIT
Cardiology
SUJIT
SUJIT
Internal Medicine

## 2022-01-17 NOTE — DISCHARGE NOTE PROVIDER - NSDCMRMEDTOKEN_GEN_ALL_CORE_FT
acetaminophen 325 mg oral tablet: 2 tab(s) orally every 6 hours, As needed, Moderate Pain (4 - 6)  aspirin 81 mg oral tablet, chewable: 1 tab(s) orally once a day  atorvastatin 10 mg oral tablet: 1 tab(s) orally once a day (at bedtime)  ergocalciferol 1.25 mg (50,000 intl units) oral capsule: 1 cap(s) orally once a week  levothyroxine 150 mcg (0.15 mg) oral tablet: 1 tab(s) orally once a day  lisinopril 2.5 mg oral tablet: 1 tab(s) orally once a day  metoprolol succinate 25 mg oral tablet, extended release: 1 tab(s) orally once a day

## 2022-01-17 NOTE — DISCHARGE NOTE PROVIDER - CARE PROVIDER_API CALL
Amador Gupta  CARDIOVASCULAR DISEASE  [address] [place] [address]  Phone: [phone]  Fax: [phone]  Follow Up Time: 2 weeks

## 2022-01-17 NOTE — PROGRESS NOTE ADULT - SUBJECTIVE AND OBJECTIVE BOX
Eva Segal  Internal Medicine   Pager # [phone]    **THIS NOTE IS INCOMPLETE**    Patient is a 51y old  Female who presents with a chief complaint of NSTEMI (16 Jan 2022 10:56)      SUBJECTIVE / OVERNIGHT EVENTS: Patient seen and examined at bedside.     ADDITIONAL REVIEW OF SYSTEMS:    MEDICATIONS  (STANDING):  aspirin  chewable 81 milliGRAM(s) Oral daily  atorvastatin 10 milliGRAM(s) Oral at bedtime  heparin   Injectable 5000 Unit(s) SubCutaneous every 8 hours  influenza   Vaccine 0.5 milliLiter(s) IntraMuscular once  levothyroxine 150 MICROGram(s) Oral daily  lidocaine   4% Patch 1 Patch Transdermal every 24 hours  lisinopril 2.5 milliGRAM(s) Oral daily  metoprolol succinate ER 25 milliGRAM(s) Oral daily    MEDICATIONS  (PRN):  acetaminophen     Tablet .. 650 milliGRAM(s) Oral every 6 hours PRN Moderate Pain (4 - 6)      CAPILLARY BLOOD GLUCOSE        I&O's Summary    16 Jan 2022 07:01  -  17 Jan 2022 07:00  --------------------------------------------------------  IN: 240 mL / OUT: 3 mL / NET: 237 mL    17 Jan 2022 07:01  -  17 Jan 2022 09:05  --------------------------------------------------------  IN: 120 mL / OUT: 0 mL / NET: 120 mL        PHYSICAL EXAM:    Vital Signs Last 24 Hrs  T(C): 36.7 (17 Jan 2022 04:00), Max: 37.3 (16 Jan 2022 21:31)  T(F): 98.1 (17 Jan 2022 04:00), Max: 99.2 (16 Jan 2022 21:31)  HR: 86 (17 Jan 2022 04:00) (86 - 93)  BP: 95/69 (17 Jan 2022 04:00) (90/57 - 110/67)  BP(mean): --  RR: 18 (17 Jan 2022 04:00) (17 - 18)  SpO2: 99% (17 Jan 2022 04:00) (99% - 99%)    CONSTITUTIONAL: NAD, well-developed, well-groomed  EYES: Conjunctiva and sclera clear  ENMT: Moist oral mucosa, no pharyngeal injection or exudates; normal dentition  NECK: Supple, no palpable masses; no thyromegaly  RESPIRATORY: Normal respiratory effort; lungs are clear to auscultation bilaterally  CARDIOVASCULAR: Regular rate and rhythm, normal S1 and S2, no murmur/rub/gallop; No lower extremity edema; Peripheral pulses are 2+ bilaterally  ABDOMEN: Nontender to palpation, normoactive bowel sounds, no rebound/guarding  MUSCULOSKELETAL: No clubbing or cyanosis of digits; no joint swelling or tenderness to palpation  PSYCH: A+O to person, place, and time; affect appropriate  NEUROLOGY: CN 2-12 are intact and symmetric; no gross sensory deficits   SKIN: No rashes; no palpable lesions    LABS:                        8.9    13.83 )-----------( 262      ( 17 Jan 2022 05:05 )             28.3     01-17    133<L>  |  96  |  17  ----------------------------<  106<H>  3.3<L>   |  22  |  0.96    Ca    9.0      17 Jan 2022 05:05  Phos  3.7     01-17  Mg     2.1     01-17        CARDIAC MARKERS ( 16 Jan 2022 02:58 )  x     / x     / 691 U/L / x     / 7.0 ng/mL            RADIOLOGY & ADDITIONAL TESTS: No new imaging  Results Reviewed: Yes  Imaging Personally Reviewed:  Electrocardiogram Personally Reviewed:    COORDINATION OF CARE:  Care Discussed with Consultants/Other Providers [Y/N]:  Prior or Outpatient Records Reviewed [Y/N]:   Eva Segal  Internal Medicine   Pager # [phone]      Patient is a 51y old  Female who presents with a chief complaint of NSTEMI (16 Jan 2022 10:56)      SUBJECTIVE / OVERNIGHT EVENTS: Patient seen and examined at bedside. Denies CP, SOB, abd pain, LE swelling, or palpitations. She is eager to go home, request a work letter. O/N on tele, NSR .     ADDITIONAL REVIEW OF SYSTEMS:    MEDICATIONS  (STANDING):  aspirin  chewable 81 milliGRAM(s) Oral daily  atorvastatin 10 milliGRAM(s) Oral at bedtime  heparin   Injectable 5000 Unit(s) SubCutaneous every 8 hours  influenza   Vaccine 0.5 milliLiter(s) IntraMuscular once  levothyroxine 150 MICROGram(s) Oral daily  lidocaine   4% Patch 1 Patch Transdermal every 24 hours  lisinopril 2.5 milliGRAM(s) Oral daily  metoprolol succinate ER 25 milliGRAM(s) Oral daily    MEDICATIONS  (PRN):  acetaminophen     Tablet .. 650 milliGRAM(s) Oral every 6 hours PRN Moderate Pain (4 - 6)      CAPILLARY BLOOD GLUCOSE        I&O's Summary    16 Jan 2022 07:01  -  17 Jan 2022 07:00  --------------------------------------------------------  IN: 240 mL / OUT: 3 mL / NET: 237 mL    17 Jan 2022 07:01  -  17 Jan 2022 09:05  --------------------------------------------------------  IN: 120 mL / OUT: 0 mL / NET: 120 mL        PHYSICAL EXAM:    Vital Signs Last 24 Hrs  T(C): 36.7 (17 Jan 2022 04:00), Max: 37.3 (16 Jan 2022 21:31)  T(F): 98.1 (17 Jan 2022 04:00), Max: 99.2 (16 Jan 2022 21:31)  HR: 86 (17 Jan 2022 04:00) (86 - 93)  BP: 95/69 (17 Jan 2022 04:00) (90/57 - 110/67)  BP(mean): --  RR: 18 (17 Jan 2022 04:00) (17 - 18)  SpO2: 99% (17 Jan 2022 04:00) (99% - 99%)    CONSTITUTIONAL: NAD, obese  EYES: Conjunctiva and sclera clear  ENMT: Moist oral mucosa, no pharyngeal injection or exudates; normal dentition  NECK: Supple, no palpable masses; no thyromegaly  RESPIRATORY: Normal respiratory effort; lungs are clear to auscultation bilaterally  CARDIOVASCULAR: Regular rate and rhythm, normal S1 and S2, no murmur/rub/gallop; No lower extremity edema; Peripheral pulses are 2+ bilaterally  ABDOMEN: Nontender to palpation, normoactive bowel sounds, no rebound/guarding  MUSCULOSKELETAL: No clubbing or cyanosis of digits; no joint swelling or tenderness to palpation  PSYCH: A+O to person, place, and time; affect appropriate  NEUROLOGY: CN 2-12 are intact and symmetric; no gross sensory deficits   SKIN: No rashes; no palpable lesions    LABS:                        8.9    13.83 )-----------( 262      ( 17 Jan 2022 05:05 )             28.3     01-17    133<L>  |  96  |  17  ----------------------------<  106<H>  3.3<L>   |  22  |  0.96    Ca    9.0      17 Jan 2022 05:05  Phos  3.7     01-17  Mg     2.1     01-17        CARDIAC MARKERS ( 16 Jan 2022 02:58 )  x     / x     / 691 U/L / x     / 7.0 ng/mL            RADIOLOGY & ADDITIONAL TESTS: No new imaging  Results Reviewed: Yes  Imaging Personally Reviewed:  Electrocardiogram Personally Reviewed:    COORDINATION OF CARE:  Care Discussed with Consultants/Other Providers [Y/N]:  Prior or Outpatient Records Reviewed [Y/N]:

## 2022-01-17 NOTE — PROGRESS NOTE ADULT - PROBLEM SELECTOR PLAN 1
Leukocytosis now down trending, likely reactive in setting of NSTEMI and COVID 19 infection  -WBC 13.83  -clinically appears non-toxic, remains afebrile, no localizing symptoms that can point to an infection   -Regarding COVID 19, remains on RA and CXR clear from 1/13

## 2022-01-17 NOTE — PHYSICAL THERAPY INITIAL EVALUATION ADULT - CRITERIA FOR SKILLED THERAPEUTIC INTERVENTIONS
home no skilled PT needs/anticipated equipment needs at discharge/anticipated discharge recommendation

## 2022-01-17 NOTE — PROGRESS NOTE ADULT - ASSESSMENT
51 year old female with PMH of hypothyroidism p/w NSTEMI with thrombotic lesion in OM1 s/p POBA    - s/p Integrillin gtt  - Repeat angio shows distal OM lesions likely related to SCAD  - Continue aspirin  - Continue lipitor 10 qhs for non obs CAD  - Continue Toprol  - Echo with mild seg LV dysfunction  - Continue low dose acei  - Appears compensated from HF POV.   - COVID +. no signs of hypoxia. Supplemental o2 as needed.     - ok to dc cardiac wise.  will follow with me in two weeks. she knows to make appt.

## 2022-01-17 NOTE — PHYSICAL THERAPY INITIAL EVALUATION ADULT - PERTINENT HX OF CURRENT PROBLEM, REHAB EVAL
50 y/o female admitted to [facility] on 1/13/22 w/ PMH of hypothyroidism presents today with chest discomfort. Cardio consulted. Her EKG is showing Sinus rhythm with fusion complexes, ST depressions in V1-V3, TWI in I and AVl and her 1st set of troponin was elevated to 1500. She was also incidentally covid positive.  No evidence of volume overload on exam. Her BP is elevated here which is likely acute on chronic. Her EKG and positive cardiac enzymes are suggestive of NSTEMI.

## 2022-01-17 NOTE — DISCHARGE NOTE PROVIDER - HOSPITAL COURSE
52 y/o F with PMHx of hypothyroidism presents today with chest discomfort, which started at 7:30am. This morning patient woke up feeling very diaphoretic with burning sensation in throat. She then developed substernal chest pressure, 5/10 chest pain, and nausea. Denies dyspnea, PND, orthopnea, lower extremity swelling. She was brought to ED for evaluation. Prior to today. She has never had chest pain. Never had ischemic workup. She recently established care with Cardiologist Dr. Amador Gupta for HTN. She is a former smoker of 15 years.     Admitted with NSTEMI. Cardio consulted as EKG showing Sinus rhythm with fusion complexes, ST depressions in V1-V3, TWI in I and AVl and her 1st set of troponin was elevated to 1500. She was also incidentally covid positive.  No evidence of volume overload on exam. Her BP is elevated here which is likely acute on chronic. Her EKG and positive cardiac enzymes are suggestive of NSTEMI. Now, s/p balloon angioplasty to OM1 (1/13) after 100% stenosis second obtuse marginal artery, with 30% residual stenosis, s/p Integrillin gtt s/p Staged PCI (1/14) with clean cors, concern for SCAD.   CE peakes on 1/14. TTE revealed EF: 45-50%. Mild segmental LV systolic dysfunction. The basal to mid anterolateral wall and the basal to mid inferolateral wall are akinetic, borderline aneurysmal.   Pt to be discharged with Lipitor 10mg qHS, Toprol XL 25 QD, Lisinopril 2.5 QD.     Incidentally COVID + on 1/13 with no respiratory symptoms and without oxygen supplementation requirement. Not a candidate for MAB inpatient. CXR Clear lungs.   Noted leukocytosis this admission, likely reactive in setting of NSTEMI and COVID infection. Remains afebrile without acute infectious symptoms.   Pt c/o L Shoulder pain after pulling herself up and straining.   L Shoulder/Clavicle XR negative for fracture or dislocation.   Visualized left ribs are unremarkable. Mild degenerative changes are seen in the AC joint. No left pneumothorax.    PT no PT Needs.   Patient is medically cleared for discharge with outpatient cardiology follow up.

## 2022-01-17 NOTE — PROGRESS NOTE ADULT - SUBJECTIVE AND OBJECTIVE BOX
[facility] - Liz Dockery, Danielle, Albert, Radha, Frankie Pantoja  Office Number:  [phone]    Patient resting comfortably in bed in NAD.  Laying flat with no respiratory distress.  No complaints of chest pain, dyspnea, palpitations, PND, or orthopnea.    F/U for:  SCAD    Telemetry:  SR    MEDICATIONS  (STANDING):  aspirin  chewable 81 milliGRAM(s) Oral daily  atorvastatin 10 milliGRAM(s) Oral at bedtime  heparin   Injectable 5000 Unit(s) SubCutaneous every 8 hours  influenza   Vaccine 0.5 milliLiter(s) IntraMuscular once  levothyroxine 150 MICROGram(s) Oral daily  lidocaine   4% Patch 1 Patch Transdermal every 24 hours  lisinopril 2.5 milliGRAM(s) Oral daily  metoprolol succinate ER 25 milliGRAM(s) Oral daily    MEDICATIONS  (PRN):  acetaminophen     Tablet .. 650 milliGRAM(s) Oral every 6 hours PRN Moderate Pain (4 - 6)      Allergies    No Known Allergies             Vital Signs Last 24 Hrs  T(C): 37.3 (17 Jan 2022 11:12), Max: 37.3 (16 Jan 2022 21:31)  T(F): 99.1 (17 Jan 2022 11:12), Max: 99.2 (16 Jan 2022 21:31)  HR: 90 (17 Jan 2022 12:01) (86 - 93)  BP: 101/64 (17 Jan 2022 12:01) (90/57 - 116/74)  BP(mean): --  RR: 18 (17 Jan 2022 12:01) (18 - 18)  SpO2: 100% (17 Jan 2022 12:01) (99% - 100%)    I&O's Summary    16 Jan 2022 07:01  -  17 Jan 2022 07:00  --------------------------------------------------------  IN: 240 mL / OUT: 3 mL / NET: 237 mL    17 Jan 2022 07:01  -  17 Jan 2022 12:03  --------------------------------------------------------  IN: 120 mL / OUT: 0 mL / NET: 120 mL        ON EXAM:    PE deferred secondary to  COVID  LABS: All Labs Reviewed:                        8.9    13.83 )-----------( 262      ( 17 Jan 2022 05:05 )             28.3                         9.4    16.41 )-----------( 252      ( 16 Jan 2022 02:56 )             29.8                         10.4   15.87 )-----------( 259      ( 15 Rashi 2022 05:51 )             33.4     17 Jan 2022 05:05    133    |  96     |  17     ----------------------------<  106    3.3     |  22     |  0.96   16 Jan 2022 02:58    136    |  96     |  10     ----------------------------<  119    3.8     |  26     |  0.94   15 Rashi 2022 05:51    130    |  99     |  9      ----------------------------<  103    4.7     |  16     |  0.74     Ca    9.0        17 Jan 2022 05:05  Ca    8.9        16 Jan 2022 02:58  Ca    8.9        15 Rashi 2022 05:51  Phos  3.7       17 Jan 2022 05:05  Phos  3.4       15 Rashi 2022 05:51  Mg     2.1       17 Jan 2022 05:05  Mg     2.0       16 Jan 2022 02:58  Mg     1.9       15 Rashi 2022 05:51    TPro  7.5    /  Alb  3.3    /  TBili  1.2    /  DBili  x      /  AST  186    /  ALT  60     /  AlkPhos  73     15 Rashi 2022 05:51      CARDIAC MARKERS ( 16 Jan 2022 02:58 )  x     / x     / 691 U/L / x     / 7.0 ng/mL      Blood Culture:     01-14 @ 12:36  TSH: 0.27

## 2022-01-20 PROBLEM — E03.9 HYPOTHYROIDISM, UNSPECIFIED: Chronic | Status: ACTIVE | Noted: 2022-01-13

## 2022-01-26 RX ORDER — LEVOTHYROXINE SODIUM 0.15 MG/1
150 TABLET ORAL DAILY
Refills: 0 | Status: ACTIVE | COMMUNITY

## 2022-01-26 RX ORDER — ERGOCALCIFEROL 1.25 MG/1
1.25 MG CAPSULE ORAL
Refills: 0 | Status: ACTIVE | COMMUNITY

## 2022-01-30 ENCOUNTER — INPATIENT (INPATIENT)
Facility: HOSPITAL | Age: 52
LOS: 5 days | Discharge: HOME | DRG: 280 | End: 2022-02-05
Attending: STUDENT IN AN ORGANIZED HEALTH CARE EDUCATION/TRAINING PROGRAM | Admitting: STUDENT IN AN ORGANIZED HEALTH CARE EDUCATION/TRAINING PROGRAM
Payer: COMMERCIAL

## 2022-01-30 VITALS
SYSTOLIC BLOOD PRESSURE: 138 MMHG | RESPIRATION RATE: 18 BRPM | WEIGHT: 250 LBS | HEIGHT: 66 IN | OXYGEN SATURATION: 100 % | HEART RATE: 102 BPM | TEMPERATURE: 103 F | DIASTOLIC BLOOD PRESSURE: 80 MMHG

## 2022-01-30 DIAGNOSIS — I80.10 PHLEBITIS AND THROMBOPHLEBITIS OF UNSPECIFIED FEMORAL VEIN: ICD-10-CM

## 2022-01-30 DIAGNOSIS — E03.9 HYPOTHYROIDISM, UNSPECIFIED: ICD-10-CM

## 2022-01-30 DIAGNOSIS — U07.1 COVID-19: ICD-10-CM

## 2022-01-30 DIAGNOSIS — D64.9 ANEMIA, UNSPECIFIED: ICD-10-CM

## 2022-01-30 DIAGNOSIS — I82.409 ACUTE EMBOLISM AND THROMBOSIS OF UNSPECIFIED DEEP VEINS OF UNSPECIFIED LOWER EXTREMITY: ICD-10-CM

## 2022-01-30 DIAGNOSIS — I25.10 ATHEROSCLEROTIC HEART DISEASE OF NATIVE CORONARY ARTERY WITHOUT ANGINA PECTORIS: ICD-10-CM

## 2022-01-30 DIAGNOSIS — Z29.9 ENCOUNTER FOR PROPHYLACTIC MEASURES, UNSPECIFIED: ICD-10-CM

## 2022-01-30 DIAGNOSIS — Z98.890 OTHER SPECIFIED POSTPROCEDURAL STATES: Chronic | ICD-10-CM

## 2022-01-30 DIAGNOSIS — R58 HEMORRHAGE, NOT ELSEWHERE CLASSIFIED: ICD-10-CM

## 2022-01-30 DIAGNOSIS — M25.512 PAIN IN LEFT SHOULDER: ICD-10-CM

## 2022-01-30 LAB
ANION GAP SERPL CALC-SCNC: 4 MMOL/L — LOW (ref 5–17)
APPEARANCE UR: ABNORMAL
APTT BLD: 30.4 SEC — SIGNIFICANT CHANGE UP (ref 27.5–35.5)
BACTERIA # UR AUTO: ABNORMAL
BASOPHILS # BLD AUTO: 0.02 K/UL — SIGNIFICANT CHANGE UP (ref 0–0.2)
BASOPHILS NFR BLD AUTO: 0.2 % — SIGNIFICANT CHANGE UP (ref 0–2)
BILIRUB UR-MCNC: NEGATIVE — SIGNIFICANT CHANGE UP
BLD GP AB SCN SERPL QL: SIGNIFICANT CHANGE UP
BUN SERPL-MCNC: 6 MG/DL — LOW (ref 7–23)
CALCIUM SERPL-MCNC: 8.5 MG/DL — SIGNIFICANT CHANGE UP (ref 8.5–10.1)
CHLORIDE SERPL-SCNC: 107 MMOL/L — SIGNIFICANT CHANGE UP (ref 96–108)
CO2 SERPL-SCNC: 27 MMOL/L — SIGNIFICANT CHANGE UP (ref 22–31)
COLOR SPEC: YELLOW — SIGNIFICANT CHANGE UP
CREAT SERPL-MCNC: 0.9 MG/DL — SIGNIFICANT CHANGE UP (ref 0.5–1.3)
DIFF PNL FLD: ABNORMAL
EOSINOPHIL # BLD AUTO: 0.09 K/UL — SIGNIFICANT CHANGE UP (ref 0–0.5)
EOSINOPHIL NFR BLD AUTO: 0.9 % — SIGNIFICANT CHANGE UP (ref 0–6)
EPI CELLS # UR: SIGNIFICANT CHANGE UP
GLUCOSE SERPL-MCNC: 95 MG/DL — SIGNIFICANT CHANGE UP (ref 70–99)
GLUCOSE UR QL: NEGATIVE — SIGNIFICANT CHANGE UP
HCT VFR BLD CALC: 29.3 % — LOW (ref 34.5–45)
HGB BLD-MCNC: 9.3 G/DL — LOW (ref 11.5–15.5)
IMM GRANULOCYTES NFR BLD AUTO: 0.6 % — SIGNIFICANT CHANGE UP (ref 0–1.5)
INR BLD: 1.39 RATIO — HIGH (ref 0.88–1.16)
KETONES UR-MCNC: NEGATIVE — SIGNIFICANT CHANGE UP
LEUKOCYTE ESTERASE UR-ACNC: ABNORMAL
LYMPHOCYTES # BLD AUTO: 1.8 K/UL — SIGNIFICANT CHANGE UP (ref 1–3.3)
LYMPHOCYTES # BLD AUTO: 18 % — SIGNIFICANT CHANGE UP (ref 13–44)
MCHC RBC-ENTMCNC: 27.3 PG — SIGNIFICANT CHANGE UP (ref 27–34)
MCHC RBC-ENTMCNC: 31.7 GM/DL — LOW (ref 32–36)
MCV RBC AUTO: 85.9 FL — SIGNIFICANT CHANGE UP (ref 80–100)
MONOCYTES # BLD AUTO: 0.59 K/UL — SIGNIFICANT CHANGE UP (ref 0–0.9)
MONOCYTES NFR BLD AUTO: 5.9 % — SIGNIFICANT CHANGE UP (ref 2–14)
NEUTROPHILS # BLD AUTO: 7.44 K/UL — HIGH (ref 1.8–7.4)
NEUTROPHILS NFR BLD AUTO: 74.4 % — SIGNIFICANT CHANGE UP (ref 43–77)
NITRITE UR-MCNC: NEGATIVE — SIGNIFICANT CHANGE UP
NRBC # BLD: 0 /100 WBCS — SIGNIFICANT CHANGE UP (ref 0–0)
PH UR: 6.5 — SIGNIFICANT CHANGE UP (ref 5–8)
PLATELET # BLD AUTO: 543 K/UL — HIGH (ref 150–400)
POTASSIUM SERPL-MCNC: 3.8 MMOL/L — SIGNIFICANT CHANGE UP (ref 3.5–5.3)
POTASSIUM SERPL-SCNC: 3.8 MMOL/L — SIGNIFICANT CHANGE UP (ref 3.5–5.3)
PROT UR-MCNC: 30 MG/DL
PROTHROM AB SERPL-ACNC: 16 SEC — HIGH (ref 10.6–13.6)
RBC # BLD: 3.41 M/UL — LOW (ref 3.8–5.2)
RBC # FLD: 15.5 % — HIGH (ref 10.3–14.5)
RBC CASTS # UR COMP ASSIST: ABNORMAL /HPF (ref 0–4)
SARS-COV-2 RNA SPEC QL NAA+PROBE: SIGNIFICANT CHANGE UP
SODIUM SERPL-SCNC: 138 MMOL/L — SIGNIFICANT CHANGE UP (ref 135–145)
SP GR SPEC: 1.01 — SIGNIFICANT CHANGE UP (ref 1.01–1.02)
UROBILINOGEN FLD QL: 8
WBC # BLD: 10 K/UL — SIGNIFICANT CHANGE UP (ref 3.8–10.5)
WBC # FLD AUTO: 10 K/UL — SIGNIFICANT CHANGE UP (ref 3.8–10.5)
WBC UR QL: ABNORMAL

## 2022-01-30 PROCEDURE — 93970 EXTREMITY STUDY: CPT | Mod: 26

## 2022-01-30 PROCEDURE — 74177 CT ABD & PELVIS W/CONTRAST: CPT | Mod: 26,MA

## 2022-01-30 PROCEDURE — 99285 EMERGENCY DEPT VISIT HI MDM: CPT

## 2022-01-30 PROCEDURE — 99222 1ST HOSP IP/OBS MODERATE 55: CPT | Mod: GC

## 2022-01-30 PROCEDURE — 99223 1ST HOSP IP/OBS HIGH 75: CPT | Mod: GC

## 2022-01-30 PROCEDURE — 99223 1ST HOSP IP/OBS HIGH 75: CPT

## 2022-01-30 RX ORDER — METOPROLOL TARTRATE 50 MG
25 TABLET ORAL DAILY
Refills: 0 | Status: DISCONTINUED | OUTPATIENT
Start: 2022-01-30 | End: 2022-02-05

## 2022-01-30 RX ORDER — ASPIRIN/CALCIUM CARB/MAGNESIUM 324 MG
81 TABLET ORAL DAILY
Refills: 0 | Status: DISCONTINUED | OUTPATIENT
Start: 2022-01-30 | End: 2022-01-31

## 2022-01-30 RX ORDER — LISINOPRIL 2.5 MG/1
2.5 TABLET ORAL DAILY
Refills: 0 | Status: DISCONTINUED | OUTPATIENT
Start: 2022-01-30 | End: 2022-02-05

## 2022-01-30 RX ORDER — HEPARIN SODIUM 5000 [USP'U]/ML
4500 INJECTION INTRAVENOUS; SUBCUTANEOUS EVERY 6 HOURS
Refills: 0 | Status: DISCONTINUED | OUTPATIENT
Start: 2022-01-31 | End: 2022-02-03

## 2022-01-30 RX ORDER — ATORVASTATIN CALCIUM 80 MG/1
10 TABLET, FILM COATED ORAL AT BEDTIME
Refills: 0 | Status: DISCONTINUED | OUTPATIENT
Start: 2022-01-30 | End: 2022-02-05

## 2022-01-30 RX ORDER — HEPARIN SODIUM 5000 [USP'U]/ML
INJECTION INTRAVENOUS; SUBCUTANEOUS
Qty: 25000 | Refills: 0 | Status: DISCONTINUED | OUTPATIENT
Start: 2022-01-31 | End: 2022-02-02

## 2022-01-30 RX ORDER — LIDOCAINE 4 G/100G
1 CREAM TOPICAL DAILY
Refills: 0 | Status: DISCONTINUED | OUTPATIENT
Start: 2022-01-30 | End: 2022-02-05

## 2022-01-30 RX ORDER — ACETAMINOPHEN 500 MG
650 TABLET ORAL EVERY 8 HOURS
Refills: 0 | Status: DISCONTINUED | OUTPATIENT
Start: 2022-01-30 | End: 2022-02-05

## 2022-01-30 RX ORDER — LEVOTHYROXINE SODIUM 125 MCG
150 TABLET ORAL DAILY
Refills: 0 | Status: DISCONTINUED | OUTPATIENT
Start: 2022-01-30 | End: 2022-02-05

## 2022-01-30 RX ORDER — ENOXAPARIN SODIUM 100 MG/ML
110 INJECTION SUBCUTANEOUS ONCE
Refills: 0 | Status: COMPLETED | OUTPATIENT
Start: 2022-01-30 | End: 2022-01-30

## 2022-01-30 RX ORDER — ERGOCALCIFEROL 1.25 MG/1
1 CAPSULE ORAL
Qty: 0 | Refills: 0 | DISCHARGE

## 2022-01-30 RX ORDER — LISINOPRIL 2.5 MG/1
2.5 TABLET ORAL DAILY
Refills: 0 | Status: DISCONTINUED | OUTPATIENT
Start: 2022-01-30 | End: 2022-01-30

## 2022-01-30 RX ORDER — HEPARIN SODIUM 5000 [USP'U]/ML
9000 INJECTION INTRAVENOUS; SUBCUTANEOUS EVERY 6 HOURS
Refills: 0 | Status: DISCONTINUED | OUTPATIENT
Start: 2022-01-31 | End: 2022-02-03

## 2022-01-30 RX ORDER — LEVOTHYROXINE SODIUM 125 MCG
1 TABLET ORAL
Qty: 0 | Refills: 0 | DISCHARGE

## 2022-01-30 RX ADMIN — Medication 650 MILLIGRAM(S): at 21:35

## 2022-01-30 RX ADMIN — LIDOCAINE 1 PATCH: 4 CREAM TOPICAL at 21:35

## 2022-01-30 RX ADMIN — ENOXAPARIN SODIUM 110 MILLIGRAM(S): 100 INJECTION SUBCUTANEOUS at 18:40

## 2022-01-30 NOTE — CONSULT NOTE ADULT - SUBJECTIVE AND OBJECTIVE BOX
HPI:  Patient is a 50 yo female with PMHx of CAD s/p recent PCI w/angioplasty, hypothyroidism, former smoker presents to the ED for R upper leg pain x2 days. Patient states she noticed ecchymosis of R upper thigh since past few days with no associated pain, but slight discomfort. Otherwise patient denies dizziness, headache, chest pain, SOB, abdominal pain, n/v/d.     ED course:  Vitals: T102.6, , /80, RR 18, SPO2%: 100%  Labs: Hgb 9.3, HCT 29.3, Plt 543, PT 16, INR 1.39, Urinanalysis with trace LE, no nitrites and few bacteria   EKG: pending results   Imaging  CT ab: Small hemorrhage in the extraperitoneal space of the right pelvis along the right pelvic sidewall without discrete collection .Small pericardial effusion with concern for mild pericardial enhancement which may be infectious or inflammatory. A 1.6 cm indeterminate left hepatic lobe lesion.  Doppler US LE: Acute deep venous thrombosis at the right greater saphenous vein/common femoral vein junction.  Given in the ED: Lovenox 110mg subq x 1 (2022 19:20)      PAST MEDICAL & SURGICAL HISTORY:  Hypothyroidism    CAD (coronary artery disease)    NSTEMI (non-ST elevation myocardial infarction)    S/P cardiac catheterization  &amp; balloon angioplasty with thrombotic lesion in OM1        Constitutional: Denies fever, fatigue or weight loss.  Skin: Denies rash.  Eyes: Denies recent vision problems or eye pain.  ENT: Denies congestion, ear pain, or sore throat.  Endocrine: Denies thyroid problems.  Cardiovascular: Denies chest pain or palpation.  Respiratory: Denies cough, shortness of breath, congestion, or wheezing.  Gastrointestinal: Denies abdominal pain, nausea, vomiting, or diarrhea.  Genitourinary: Denies dysuria.  Musculoskeletal: SEE HPI  Neurologic: Denies headache.      MEDICATIONS  (STANDING):  aspirin  chewable 81 milliGRAM(s) Oral daily  atorvastatin 10 milliGRAM(s) Oral at bedtime  levothyroxine 150 MICROGram(s) Oral daily  lidocaine   4% Patch 1 Patch Transdermal daily  lisinopril 2.5 milliGRAM(s) Oral daily  metoprolol succinate ER 25 milliGRAM(s) Oral daily    MEDICATIONS  (PRN):  acetaminophen     Tablet .. 650 milliGRAM(s) Oral every 8 hours PRN Mild Pain (1 - 3)      Allergies    No Known Allergies    Intolerances        SOCIAL HISTORY:      Vital Signs Last 24 Hrs  T(C): 37.1 (2022 16:03), Max: 39.2 (2022 10:39)  T(F): 98.7 (2022 16:03), Max: 102.6 (2022 10:39)  HR: 89 (2022 16:03) (89 - 102)  BP: 111/63 (2022 16:03) (111/63 - 138/80)  BP(mean): --  RR: 17 (2022 16:03) (17 - 18)  SpO2: 100% (2022 16:03) (100% - 100%)    PHYSICAL EXAM:  GENERAL: No acute distress, well-developed  HEAD:  Atraumatic, Normocephalic  L/R GROIN: dressing non-saturated. No signs of swelling, surrounding tissue soft, no signs of erythema or fluid collection.  LOWER EXTREM: DP & PT palpable bilaterally. R upper thigh swelling with visible ecchymosis. tissue is soft, no discernible collection with palpation.  MSK:  5/5 strength at all muscle groups crossing ankle joint b/l.  NEURO: Grossly intact    LABS:                        9.3    10.00 )-----------( 543      ( 2022 11:45 )             29.3     01-30    138  |  107  |  6<L>  ----------------------------<  95  3.8   |  27  |  0.90    Ca    8.5      2022 11:50      PT/INR - ( 2022 11:45 )   PT: 16.0 sec;   INR: 1.39 ratio         PTT - ( 2022 11:45 )  PTT:30.4 sec  Urinalysis Basic - ( 2022 13:44 )    Color: Yellow / Appearance: Slightly Turbid / S.010 / pH: x  Gluc: x / Ketone: Negative  / Bili: Negative / Urobili: 8   Blood: x / Protein: 30 mg/dL / Nitrite: Negative   Leuk Esterase: Trace / RBC: 6-10 /HPF / WBC 6-10   Sq Epi: x / Non Sq Epi: Occasional / Bacteria: Few    RAD:  < from: CT Abdomen and Pelvis w/ IV Cont (22 @ 15:45) >  IMPRESSION:  Small hemorrhage in the extraperitoneal space of the right pelvis along   the right pelvic sidewall without discrete collection.    Expanded right greater saphenous vein and heterogeneity of the right   external iliac vein raising concern for thrombosis. Correlation with   right lower extremity venous Doppler is recommended with specific   attention to these vessels.    Small pericardial effusion with concern for mild pericardial enhancement   which may be infectious or inflammatory.    A 1.6 cm indeterminate left hepatic lobe lesion.    --- End of Report ---    HIRO PRICE MD; Attending Radiologist  This document has been electronically signed. 2022  4:12PM    < end of copied text >  < from: US Duplex Venous Lower Ext Complete, Bilateral (22 @ 17:42) >  FINDINGS:    RIGHT:  Acute thrombus is present within the greater saphenous vein extending to   the common femoral vein junction. Normal compressibility of the femoral   and popliteal veins.  Doppler examination shows normal spontaneous and phasic flow.  No RIGHT calf vein thrombosis is detected.    LEFT:  Normal compressibility of the LEFT common femoral, femoral and popliteal   veins.  Doppler examination shows normal spontaneous and phasic flow.  No LEFT calf vein thrombosis is detected.    IMPRESSION:  Acute deep venous thrombosis at the right greater saphenous vein/common   femoral vein junction.  Acute deep venous thrombosis: above the knee.    Findings were discussed with Dr. NEVA SHIPLEY  2022 5:55 PM by Dr. Price with read back confirmation.    --- End of Report ---    HIRO PRICE MD; Attending Radiologist  This document has been electronically signed. 2022  5:57PM    < end of copied text >

## 2022-01-30 NOTE — H&P ADULT - NSICDXPASTSURGICALHX_GEN_ALL_CORE_FT
PAST SURGICAL HISTORY:  No significant past surgical history      PAST SURGICAL HISTORY:  S/P cardiac catheterization & balloon angioplasty with thrombotic lesion in OM1

## 2022-01-30 NOTE — ED PROVIDER NOTE - SKIN, MLM
[Skin normal color for race, warm, dry and intact. No evidence of rash. see above

## 2022-01-30 NOTE — CONSULT NOTE ADULT - PROBLEM SELECTOR RECOMMENDATION 9
-Discussed with Dr. Carbajal  -[facility] heparin drip without bolus  -All abnormal blood on CT likely from post-surgical changes  -Monitor h/h, if signs of bleeding should consider stopping drip  -If pt tolerates heparin drip, can begin to bridge  -Will follow

## 2022-01-30 NOTE — H&P ADULT - PROBLEM SELECTOR PLAN 4
Patient complaining of chronic L shoulder pain, X-ray L shoulder 1/13 w/ no acute findings   - Uses Tylenol and heating pad at home   - Will continue Tylenol PRN for mild pain and start on Lidocaine patch Hbg on admission 9.3, with baseline of around 12  - CT abd on admission: Small hemorrhage in the extraperitoneal space of the right pelvis along the right pelvic sidewall without discrete collection .Small pericardial effusion with concern for mild pericardial enhancement which may be infectious or inflammatory. A 1.6 cm indeterminate left hepatic lobe lesion.  - no acute hematoma on physical exam   - will monitor HH closely for any acute drop  - Cardio consulted, reccs piero Hbg on admission 9.3, with baseline of around 12  - CT abd on admission: Small hemorrhage in the extraperitoneal space of the right pelvis along the right pelvic sidewall without discrete collection .Small pericardial effusion with concern for mild pericardial enhancement which may be infectious or inflammatory. A 1.6 cm indeterminate left hepatic lobe lesion.  - no acute hematoma on physical exam   - will monitor HH closely for any acute drop  - check iron studies in AM

## 2022-01-30 NOTE — H&P ADULT - ASSESSMENT
50 yo female with PMHx of hypothyroidism, former smoker admitted for R acute DVT at greater saphenous vein/common femoral vein junction and COVID 19 positive.   50 yo female with PMHx of hypothyroidism, former smoker admitted for R acute DVT at greater saphenous vein/common femoral vein junction. 50 yo female with PMHx of hypothyroidism, CAD/NSTEMI s/p POBA to OM1 followed by PCI c/w SCAD, former smoker admitted for R acute DVT at greater saphenous vein/common femoral vein junction.

## 2022-01-30 NOTE — H&P ADULT - NSICDXPASTMEDICALHX_GEN_ALL_CORE_FT
PAST MEDICAL HISTORY:  Hypothyroidism      PAST MEDICAL HISTORY:  CAD (coronary artery disease)     Hypothyroidism     NSTEMI (non-ST elevation myocardial infarction)

## 2022-01-30 NOTE — H&P ADULT - NSHPSOCIALHISTORY_GEN_ALL_CORE
Occupation:  Smoking:  Alcohol:  Recc drug:  ADLs:    COVID Vaccine: Occupation: work from home,    Smoking: quit last year, Hx of 15 yr smoking <1/2 pack daily   Alcohol: socially drinks wine   Recc drug: denies   ADLs: independent     COVID Vaccine: Moderna x2, second dose 9/14/21 Occupation: work from home,    Smoking: quit last year, Hx of 15 yr smoking <1/2 pack daily   Alcohol: socially drinks wine   Rec drug: denies   ADLs: independent  Occupation:     COVID Vaccine: Moderna x2, second dose 9/14/21

## 2022-01-30 NOTE — ED PROVIDER NOTE - NSICDXFAMILYHX_GEN_ALL_CORE_FT
FAMILY HISTORY:  Mother  Still living? Unknown  FH: HTN (hypertension), Age at diagnosis: Age Unknown  FH: hyperlipidemia, Age at diagnosis: Age Unknown  FH: type 2 diabetes, Age at diagnosis: Age Unknown

## 2022-01-30 NOTE — ED PROVIDER NOTE - PROGRESS NOTE DETAILS
[facility] cardiology will see pt in consult pt stable, cardiology paged as ct is resulted and has some abnormalities. await call back radiology called pt has dvt in prox deep venous system dr samson called notified,  pt aware  hospitalist aware accepts pat

## 2022-01-30 NOTE — H&P ADULT - PROBLEM SELECTOR PLAN 6
Chronic, on Synthroid 150mcg daily at home  - will continue home medication - started on full dose anticoag with Lovenox 110mg subq daily - started on full dose anticoag with Lovenox 110mg subq BID VTE ppx: on full dose AC with heparin gtt

## 2022-01-30 NOTE — H&P ADULT - PROBLEM SELECTOR PLAN 7
- started on full dose anticoag with Lovenox 110mg subq daily - CT abd on admission: Small hemorrhage in the extraperitoneal space of the right pelvis along the right pelvic sidewall without discrete collection .Small pericardial effusion with concern for mild pericardial enhancement which may be infectious or inflammatory. A 1.6 cm indeterminate left hepatic lobe lesion.  - no acute hematoma on physical exam   - will monitor HH closely for any acute drop  - Cardio consulted, reccs piero - CT abd/pelvis with IV contrast on admission: Small hemorrhage in the extraperitoneal space of the right pelvis along the right pelvic sidewall without discrete collection. Small pericardial effusion with concern for mild pericardial enhancement which may be infectious or inflammatory. A 1.6 cm indeterminate left hepatic lobe lesion.   - likely all post-operative changes but will proceed with heparin gtt instead of LMWH  - will monitor HH closely for any acute drop  - Cardio consulted, recs appreciated  - Vascular consulted, recs appreciated

## 2022-01-30 NOTE — H&P ADULT - PROBLEM SELECTOR PLAN 8
- CT abd on admission: Small hemorrhage in the extraperitoneal space of the right pelvis along the right pelvic sidewall without discrete collection .Small pericardial effusion with concern for mild pericardial enhancement which may be infectious or inflammatory. A 1.6 cm indeterminate left hepatic lobe lesion.  - no acute hematoma on physical exam   - will monitor HH closely for any acute drop  - Cardio consulted, reccs piero

## 2022-01-30 NOTE — CONSULT NOTE ADULT - ATTENDING COMMENTS
Chart reviewed    Patient seen and examined    Agree with plan as outlined above    Assessment/Plan:  51 F with CAD s/p recent stent to OM1 c/w SCAD, and hypothyroidism p/w right upper thigh ecchymosis that she noticed 2 days ago.  Denies dizziness or lightheadedness.  Denies numbness or tingling of RLE.  Denies chest pain or palpitations, SOB or SARAVIA.  Of note, patient had recently had a cardiac cath on 1/13 and 14 using an angioseal with no complication on exit site of catheter.      CAD s/p recent stent to OM1 with ?hematoma  - Angioseal used as a closing device.  No clear evidence of hematoma on exam.  Patient is totally asymptomatic   - Doubt hematoma or active bleeding  - Follow up CBC  - Follow up CT to r/o RPB  - If no acute drop in H/H and above CT negative, can be discharged from cardiac standpoint.  To follow up with Dr. Garica as outpatient.  She is supposed to f/u 2 weeks from D/C in [place]    If admitted, will continue to follow
as above

## 2022-01-30 NOTE — H&P ADULT - PROBLEM SELECTOR PLAN 3
CAD s/p recent stent on 1/13 at [facility] to OM1 c/w SCAD  - following with Dr. Kay outpatient cardiology   - started on ASA 81mg, Atorvastatin 10mg, Ergocalciferol 1.25, Lisinopril 2.5mg, and Metoprolol Succinate 25mg   - continue home medications with hold parameters Patient complaining of chronic L shoulder pain, X-ray L shoulder 1/13 w/ no acute findings   - Uses Tylenol and heating pad at home   - Will continue Tylenol PRN for mild pain and start on Lidocaine patch Patient complaining of L shoulder pain from [facility] when she pulled on something  - X-ray L shoulder 1/13 w/ no acute findings   - Uses Tylenol and heating pad at home   - Will continue Tylenol PRN for mild pain and start on Lidocaine patch

## 2022-01-30 NOTE — ED PROVIDER NOTE - OBJECTIVE STATEMENT
on 1/14/22 pt underwent cardiac cath for nstemi at [facility]. was diagnosed with SCAD there were no complications, she had developed covid before the cardiac event. she was followed by dr Garcia at [facility]. and her pmd is dr Haley Nagel.  she has hx of hypothyroid, former smoker.  She presents today with 2 days of bruising not painful to the r anterior and medial thigh. no calf pain no swelling no abd pain no back pain no pins and needles or discoloration to foot no co cp   no sob no diaphoresis no trauma. 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

## 2022-01-30 NOTE — ED PROVIDER NOTE - CLINICAL SUMMARY MEDICAL DECISION MAKING FREE TEXT BOX
50 yo f who is sp cardiac cath has become active and noticed bruising on thigh same side as cath approx 2 weeks ago. there is no pain, no sob no abd pain no other complaints.  fearing complication of the cath she came to er for evaluation  plan cards eval of wound  ct to ro retroperineal bleed   coags cbc labs re evaluation and  dispo

## 2022-01-30 NOTE — H&P ADULT - PROBLEM SELECTOR PLAN 1
Patient presents with fever on admission, likely 2/2 confirmed COVID-19 infection  - Asymptomatic + on 1/13- denies SOB, saturating well on RA   - Admit to GMF  - Supplemental O2 prn maintain O2 sats >88%. Prone PRN  - Patient not a likely candidate for Decadron and/or Remdesivir   - Monitor volume status closely, avoid aggressive hydration  - Tylenol prn myalgias, fever  - Avoid nebs. continue with MDI prn.  - CBC with diff and CMP QD.   - Started on full dose anticoag Lovenox 110mg sub qd for new R upper leg DVT   - Isolation precautions per protocol  - Monitor fever curve, renal function  - Will hold on consulting Pulm and ID for now, will reconsider if symptoms worsen Patient complaining of R upper thigh ecchymosis  x 3 days, denies calf pain   - Found to be COVID + on 1/13, negative on this admission   - On admission doppler US LE: Acute deep venous thrombosis at the right greater saphenous vein/common femoral vein junction.  - s/p Lovenox 110mg subq x 1 dose   - will continue full dose anticoag with Lovenox 110mg subq qd  - continue to monitor for signs of acute SOB and/or chest pain Patient complaining of R upper thigh ecchymosis  x 3 days, denies calf pain   - Found to be COVID + on 1/13, negative on this admission   - On admission doppler US LE: Acute deep venous thrombosis at the right greater saphenous vein/common femoral vein junction.  - s/p Lovenox 110mg subq x 1 dose   - will continue full dose anticoag with Lovenox 110mg subq BID  - continue to monitor for signs of acute SOB and/or chest pain Patient complaining of R upper thigh ecchymosis x 3 days, denies calf pain   - Found to be COVID + on 1/13, negative on this admission   - On admission doppler US LE: Acute deep venous thrombosis at the right greater saphenous vein/common femoral vein junction.  - s/p Lovenox 110mg subq x 1 dose   - will continue full dose anticoagulation with heparin gtt per vascular recs  - continue to monitor for signs of acute SOB and/or chest pain

## 2022-01-30 NOTE — ED PROVIDER NOTE - MUSCULOSKELETAL, MLM
Spine appears normal, range of motion is not limited, no muscle or joint tenderness the medial and ant thigh have a bruise that is mildly tender to palp no calf pain

## 2022-01-30 NOTE — H&P ADULT - NSHPPHYSICALEXAM_GEN_ALL_CORE
T(C): 37.1 (01-30-22 @ 16:03), Max: 39.2 (01-30-22 @ 10:39)  HR: 89 (01-30-22 @ 16:03) (89 - 102)  BP: 111/63 (01-30-22 @ 16:03) (111/63 - 138/80)  RR: 17 (01-30-22 @ 16:03) (17 - 18)  SpO2: 100% (01-30-22 @ 16:03) (100% - 100%)    Physical Exam:  Gen: well appearing, NAD  HEENT: NCAT, PEERLA b/l, EOMI b/l, no conjunctival erythema  Cardio: regular rate and rhythm, +s1s2, no murmurs, rubs, or gallops  Pulm: CTA b/l, no wheezes, rales or rhonchi  Abdomen: soft, nontender, nondistended, +BS x4 quadrants, no guarding  Extremities: no clubbing, cyanosis or edema, +2 pedal pulses  Neuro: AAOx3, CNII-XII intact grossly, 5/5 strength all extremities, sensation intact  Skin: warm and dry T(C): 37.1 (01-30-22 @ 16:03), Max: 39.2 (01-30-22 @ 10:39)  HR: 89 (01-30-22 @ 16:03) (89 - 102)  BP: 111/63 (01-30-22 @ 16:03) (111/63 - 138/80)  RR: 17 (01-30-22 @ 16:03) (17 - 18)  SpO2: 100% (01-30-22 @ 16:03) (100% - 100%)    Physical Exam:  Gen: well appearing, NAD  HEENT: NCAT, PEERLA b/l, EOMI b/l, no conjunctival erythema  Cardio: regular rate and rhythm, +s1s2, no murmurs, rubs, or gallops  Pulm: CTA b/l, no wheezes, rales or rhonchi  Abdomen: soft, nontender, nondistended, +BS x4 quadrants, no guarding  Extremities: no clubbing, cyanosis or edema, +2 pedal pulses  Neuro: AAOx3, CNII-XII intact grossly, 5/5 strength all extremities, sensation intact  Skin: warm and dry, + ecchymosis of R upper thigh T(C): 37.1 (01-30-22 @ 16:03), Max: 39.2 (01-30-22 @ 10:39)  HR: 89 (01-30-22 @ 16:03) (89 - 102)  BP: 111/63 (01-30-22 @ 16:03) (111/63 - 138/80)  RR: 17 (01-30-22 @ 16:03) (17 - 18)  SpO2: 100% (01-30-22 @ 16:03) (100% - 100%)    Gen: well appearing, NAD  HEENT: NCAT, PERRL b/l, EOMI b/l, no conjunctival erythema  Cardio: regular rate and rhythm, +s1s2, no murmurs, rubs, or gallops  Pulm: CTA b/l, no wheezes, rales or rhonchi  Abdomen: soft, nontender, nondistended, +BS x4 quadrants, no guarding  Extremities: no clubbing, cyanosis or edema, +2 pedal pulses  Neuro: AAOx3, CNII-XII intact grossly, 5/5 strength all extremities, sensation intact  Skin: warm and dry, + ecchymosis of R upper thigh  Psych: normal affect

## 2022-01-30 NOTE — ED PROVIDER NOTE - CARDIAC, MLM
Normal rate, regular rhythm.  Heart sounds S1, S2.  No murmurs, rubs or gallops. good pedal pulses and cap refill

## 2022-01-30 NOTE — H&P ADULT - ATTENDING COMMENTS
50 yo female with PMHx of hypothyroidism, CAD/NSTEMI s/p POBA to OM1 followed by PCI c/w SCAD, former smoker admitted for R acute DVT at greater saphenous vein/common femoral vein junction. Admit to medicine. Cardiology consult appreciated, will continue medications started at [facility]. New above knee DVT noted - given full dose LMWH, will switch to heparin gtt per vascular recs. Monitor H/H and for signs of bleeding given small hemorrhage in the extraperitoneal space of the right pelvis along the right pelvic sidewall on CT imaging. She had cardiac catheterization with balloon angioplasty and the findings may all be post-interventional as H/H is stable from discharge. CT also noted small pericardial effusion. Recent TTE noted with mild segmental left ventricular systolic dysfunction, stage 1 DD and EF of 45-50%. Follow up further cardiology recommendations. Discussed with vascular surgery. Discussed with patient at bedside.    Agree with H&P as outlined above, edited where appropriate.

## 2022-01-30 NOTE — H&P ADULT - NSHPLABSRESULTS_GEN_ALL_CORE
.    EKG 1/16/22 personally reviewed: SR at 91bpm, inferior-posterior infarct.    An admission EKG was ordered by myself and is pending.    Labs & Imaging all personally reviewed by the attending physician.

## 2022-01-30 NOTE — H&P ADULT - PROBLEM SELECTOR PLAN 5
Hbg on admission 9.3, with baseline of around 12  - CT abd on admission: Small hemorrhage in the extraperitoneal space of the right pelvis along the right pelvic sidewall without discrete collection .Small pericardial effusion with concern for mild pericardial enhancement which may be infectious or inflammatory. A 1.6 cm indeterminate left hepatic lobe lesion.  - no acute hematoma on physical exam   - will monitor HH closely for any acute drop  - Cardio consulted, reccs piero Chronic, on Synthroid 150mcg daily at home  - will continue home medication

## 2022-01-30 NOTE — ED PROVIDER NOTE - ENMT, MLM
Airway patent, Nasal mucosa clear. Mouth with normal mucosa. Throat has no vesicles, no oropharyngeal exudates and uvula is midline. no pallor no g f r

## 2022-01-30 NOTE — CONSULT NOTE ADULT - SUBJECTIVE AND OBJECTIVE BOX
[facility] - Liz Dockery, Albert Santos, Radha, Kit, Frankie Friedman  Office Number: [phone]    Initial Consult Note: 51 F with CAD s/p recent stent to OM1 c/w SCAD, and hypothyroidism p/w right upper thigh ecchymosis    CHIEF COMPLAINT: Patient is a 51y old  Female who presents with a chief complaint of     HPI: 51 F with CAD s/p recent stent to OM1 c/w SCAD, and hypothyroidism p/w right upper thigh ecchymosis that she noticed 2 days ago.      PAST MEDICAL & SURGICAL HISTORY:  Hypothyroidism    No significant past surgical history    SOCIAL HISTORY:  No tobacco, ethanol, or drug abuse.  FAMILY HISTORY:  FH: type 2 diabetes (Mother)    FH: HTN (hypertension) (Mother)    FH: hyperlipidemia (Mother)      No family history of acute MI or sudden cardiac death.  MEDICATIONS  (STANDING):    MEDICATIONS  (PRN):    Allergies    No Known Allergies    Intolerances      REVIEW OF SYSTEMS:  CONSTITUTIONAL: No weakness, fevers or chills  EYES/ENT: No visual changes;  No vertigo or throat pain   NECK: No pain or stiffness  RESPIRATORY: No cough, wheezing, hemoptysis; No shortness of breath  CARDIOVASCULAR: No chest pain or palpitations  GASTROINTESTINAL: No abdominal pain. No nausea, vomiting, or hematemesis; No diarrhea or constipation. No melena or hematochezia.  GENITOURINARY: No dysuria, frequency or hematuria  NEUROLOGICAL: No numbness or weakness  SKIN: No itching or rash  All other review of systems is negative unless indicated above  VITAL SIGNS:   Vital Signs Last 24 Hrs  T(C): 39.2 (30 Jan 2022 10:39), Max: 39.2 (30 Jan 2022 10:39)  T(F): 102.6 (30 Jan 2022 10:39), Max: 102.6 (30 Jan 2022 10:39)  HR: 102 (30 Jan 2022 10:39) (102 - 102)  BP: 138/80 (30 Jan 2022 10:39) (138/80 - 138/80)  BP(mean): --  RR: 18 (30 Jan 2022 10:39) (18 - 18)  SpO2: 100% (30 Jan 2022 10:39) (100% - 100%)  I&O's Summary    On Exam:  Constitutional: NAD, alert and oriented x 3  Lungs:  Non-labored, breath sounds are clear bilaterally, No wheezing, rales or rhonchi  Cardiovascular: RRR.  S1 and S2 positive.  No murmurs, rubs, gallops or clicks  Gastrointestinal: Bowel Sounds present, soft, nontender.   Lymph: No peripheral edema. No cervical lymphadenopathy.  Neurological: Alert, no focal deficits  Skin: No rashes or ulcers   Psych:  Mood & affect appropriate.    LABS: All Labs Reviewed:                Blood Culture:         RADIOLOGY:    EKG:    Assessment/Plan:  51y Female    Janna Cindy DNP, ANP-C  Cardiology  Spectra #[phone]       [facility] - Liz Dockery, Albert Santos, Radha, Kit, Frankie Friedman  Office Number: [phone]    Initial Consult Note: 51 F with CAD s/p recent stent to OM1 c/w SCAD, and hypothyroidism p/w right upper thigh ecchymosis    CHIEF COMPLAINT: Patient is a 51y old  Female who presents with a chief complaint of     HPI: 51 F with CAD s/p recent stent to OM1 c/w SCAD, and hypothyroidism p/w right upper thigh ecchymosis that she noticed 2 days ago.  Denies dizziness or lightheadedness.  Denies numbness or tingling of RLE.  Denies chest pain or palpitations, SOB or SARAVIA.  Of note, patient had recently had a cardiac cath on 1/13 and 14 using an angioseal with no complication on exit site of catheter.      Upon evaluation, a pea-sized solid material noted on right femoral area, consistent with Angioseal device as well as old ecchymoses on right upper thigh.  No neurovascular abnormality noted.    PAST MEDICAL & SURGICAL HISTORY:  Hypothyroidism    No significant past surgical history    SOCIAL HISTORY:  No tobacco, ethanol, or drug abuse.  FAMILY HISTORY:  FH: type 2 diabetes (Mother)    FH: HTN (hypertension) (Mother)    FH: hyperlipidemia (Mother)    No family history of acute MI or sudden cardiac death.  MEDICATIONS  (STANDING):    MEDICATIONS  (PRN):    Allergies    No Known Allergies    Intolerances    REVIEW OF SYSTEMS:  CONSTITUTIONAL: No weakness, fevers or chills  EYES/ENT: No visual changes;  No vertigo or throat pain   NECK: No pain or stiffness  RESPIRATORY: No cough, wheezing, hemoptysis; No shortness of breath  CARDIOVASCULAR: No chest pain or palpitations  GASTROINTESTINAL: No abdominal pain. No nausea, vomiting, or hematemesis; No diarrhea or constipation. No melena or hematochezia.  GENITOURINARY: No dysuria, frequency or hematuria  NEUROLOGICAL: No numbness or weakness  SKIN: No itching or rash  All other review of systems is negative unless indicated above  VITAL SIGNS:   Vital Signs Last 24 Hrs  T(C): 39.2 (30 Jan 2022 10:39), Max: 39.2 (30 Jan 2022 10:39)  T(F): 102.6 (30 Jan 2022 10:39), Max: 102.6 (30 Jan 2022 10:39)  HR: 102 (30 Jan 2022 10:39) (102 - 102)  BP: 138/80 (30 Jan 2022 10:39) (138/80 - 138/80)  BP(mean): --  RR: 18 (30 Jan 2022 10:39) (18 - 18)  SpO2: 100% (30 Jan 2022 10:39) (100% - 100%)  I&O's Summary    On Exam:  Constitutional: NAD, alert and oriented x 3, obese  Lungs:  Non-labored, breath sounds are clear bilaterally, No wheezing, rales or rhonchi  Cardiovascular: RRR.  S1 and S2 positive.  No murmurs, rubs, gallops or clicks  Gastrointestinal: Bowel Sounds present, soft, nontender.   Lymph: No peripheral edema. No cervical lymphadenopathy.  Neurological: Alert, no focal deficits  Skin: No rashes or ulcers   Psych:  Mood & affect appropriate.    LABS: All Labs Reviewed:    Blood Culture:     RADIOLOGY:    EKG:

## 2022-01-30 NOTE — ED ADULT NURSE NOTE - OBJECTIVE STATEMENT
Pt received in bed alert and oriented and resting in bed with the c/o large right inner thigh bruise which started 2 days prior. Pt admits that she is s/p cardiac cath. As per MD's orders Iv lois placed blood specimen obtained and sent to the lab. Pt stable and now being evaluated b cardiology. Nursing care ongoing and safety maintained.

## 2022-01-30 NOTE — H&P ADULT - HISTORY OF PRESENT ILLNESS
Patient is a 50 yo female with PMHx of hypothyroidism, former smoker presents to the ED for R upper leg pain. Of note patient underwent cardiac cath for nstemi at [facility] on 1/14/22.       ED course:  Vitals: T102.6, , /80, RR 18, SPO2%: 100%  Labs: Hgb 9.3, HCT 29.3, Plt 543, PT 16, INR 1.39, Urinanalysis with trace LE, no nitrites and few bacteria   EKG:  Imaging  CT ab: Small hemorrhage in the extraperitoneal space of the right pelvis along the right pelvic sidewall without discrete collection.Small pericardial effusion with concern for mild pericardial enhancement which may be infectious or inflammatory.A 1.6 cm indeterminate left hepatic lobe lesion.  Doppler US LE: Acute deep venous thrombosis at the right greater saphenous vein/common femoral vein junction.  Given in the ED: Lovenox 110mg subq x 1 Patient is a 52 yo female with PMHx of CAD s/p recent stent to OM1 c/w SCAD, hypothyroidism, former smoker presents to the ED for R upper leg pain. Patient states she noticed ecchymosis of R upper thigh since past few days with no associated pain, but slight discomfort. Patient states she never noticed this before and decided to wait and noticed that her bruise was getting bigger and more uncomfortable. Patient states she took Tylenol for slight discomfort. Patient reports feeling R shoulder pain and states she feels like she pulled a muscle while sitting upright. States she had x-rays done at [facility] during her last hospitalization and results were normal. Otherwise patient denies dizziness, headache, chest pain, SOB, abdominal pain, n/v/d.   Of note patient underwent cardiac cath for nstemi at [facility] on 1/14/22, with no complications and patient follows with Dr. Kay outpatient.       ED course:  Vitals: T102.6, , /80, RR 18, SPO2%: 100%  Labs: Hgb 9.3, HCT 29.3, Plt 543, PT 16, INR 1.39, Urinanalysis with trace LE, no nitrites and few bacteria   EKG: pending results   Imaging  CT ab: Small hemorrhage in the extraperitoneal space of the right pelvis along the right pelvic sidewall without discrete collection .Small pericardial effusion with concern for mild pericardial enhancement which may be infectious or inflammatory. A 1.6 cm indeterminate left hepatic lobe lesion.  Doppler US LE: Acute deep venous thrombosis at the right greater saphenous vein/common femoral vein junction.  Given in the ED: Lovenox 110mg subq x 1 Patient is a 50 yo female with PMHx of CAD s/p recent stent to OM1 c/w SCAD, hypothyroidism, former smoker presents to the ED for R upper leg pain. Patient states she noticed ecchymosis of R upper thigh since past few days with no associated pain, but slight discomfort. Patient states she never noticed this before and decided to wait and noticed that her bruise was getting bigger and more uncomfortable. Patient states she took Tylenol for slight discomfort. Patient reports feeling R shoulder pain and states she feels like she pulled a muscle while sitting upright. States she had x-rays done at [facility] during her last hospitalization and results were normal. Otherwise patient denies dizziness, headache, chest pain, SOB, abdominal pain, n/v/d.   Of note patient underwent cardiac cath for nstemi at [facility] on 1/14/22, with no complications and patient follows with Dr. Kay outpatient.   Of note patient diagnosed with COVID 19 on 1/13/22, COVID negative on admission       ED course:  Vitals: T102.6, , /80, RR 18, SPO2%: 100%  Labs: Hgb 9.3, HCT 29.3, Plt 543, PT 16, INR 1.39, Urinanalysis with trace LE, no nitrites and few bacteria   EKG: pending results   Imaging  CT ab: Small hemorrhage in the extraperitoneal space of the right pelvis along the right pelvic sidewall without discrete collection .Small pericardial effusion with concern for mild pericardial enhancement which may be infectious or inflammatory. A 1.6 cm indeterminate left hepatic lobe lesion.  Doppler US LE: Acute deep venous thrombosis at the right greater saphenous vein/common femoral vein junction.  Given in the ED: Lovenox 110mg subq x 1 50 yo female with PMHx of CAD s/p POBA to OM1 followed by PCI c/w SCAD, hypothyroidism, former smoker presents to the ED for R upper leg pain. Patient states she noticed ecchymosis of R upper thigh since past few days with no associated pain, but slight discomfort. Patient states she never noticed this before and decided to wait and noticed that her bruise was getting bigger and more uncomfortable. Patient states she took Tylenol for slight discomfort. Patient reports feeling R shoulder pain and states she feels like she pulled a muscle while sitting upright. States she had x-rays done at [facility] during her last hospitalization and results were normal. Otherwise patient denies dizziness, headache, chest pain, SOB, abdominal pain, n/v/d.   Of note patient underwent cardiac cath for nstemi at [facility] on 1/14/22, with no complications and patient follows with Dr. Kay outpatient.   Of note patient diagnosed with COVID 19 on 1/13/22, COVID negative on admission     ED course:  Vitals: T102.6, , /80, RR 18, SPO2%: 100%  Labs: Hgb 9.3, HCT 29.3, Plt 543, PT 16, INR 1.39, Urinanalysis with trace LE, no nitrites and few bacteria   CT A/P w/ IV contrast: Small hemorrhage in the extraperitoneal space of the right pelvis along the right pelvic sidewall without discrete collection .Small pericardial effusion with concern for mild pericardial enhancement which may be infectious or inflammatory. A 1.6 cm indeterminate left hepatic lobe lesion.  Doppler US LE: Acute deep venous thrombosis at the right greater saphenous vein/common femoral vein junction.  Given in the ED: Lovenox 110mg subq x 1

## 2022-01-30 NOTE — H&P ADULT - PROBLEM SELECTOR PLAN 2
Patient complaining of R upper thigh ecchymosis  x 3 days, denies calf pain   - On admission doppler US LE: Acute deep venous thrombosis at the right greater saphenous vein/common femoral vein junction.  - s/p Lovenox 110mg subq x 1 dose   - will continue full dose anticoag with Lovenox 110mg subq qd CAD s/p recent stent on 1/13 at [facility] to OM1 c/w SCAD  - following with Dr. Kay outpatient cardiology   - started on ASA 81mg, Atorvastatin 10mg, Ergocalciferol 1.25, Lisinopril 2.5mg, and Metoprolol Succinate 25mg   - continue home medications with hold parameters CAD with NSTEMI s/p POBA to OM1 followed by PCI c/w SCAD on 1/13  - following with Dr. Kay outpatient cardiology   - started on ASA 81mg, Atorvastatin 10mg, Ergocalciferol 1.25, Lisinopril 2.5mg, and Metoprolol Succinate 25mg   - continue home medications with hold parameters

## 2022-01-30 NOTE — H&P ADULT - NSHPREVIEWOFSYSTEMS_GEN_ALL_CORE
Constitutional: denies fever, chills, sweating  HEENT: denies headache, dizziness, or lightheadedness  Respiratory: denies SOB, cough, or wheezing  Cardiovascular: denies CP, palpitations  Gastrointestinal: denies nausea, vomiting, diarrhea, constipation, abdominal pain, or bloody stools  Genitourinary: denies painful urination, increased frequency, urgency, or bloody urine  Skin/Breast: denies rashes or itching  Musculoskeletal: denies muscle aches, joint swelling, or muscle weakness  Neurologic: denies loss of sensation, numbness, or tingling  ROS negative except as noted above Constitutional: denies fever, chills, sweating  HEENT: denies headache, dizziness, or lightheadedness  Respiratory: denies SOB, cough, or wheezing  Cardiovascular: denies CP, palpitations  Gastrointestinal: denies nausea, vomiting, diarrhea, constipation, abdominal pain, or bloody stools  Genitourinary: denies painful urination, increased frequency, urgency, or bloody urine  Skin: Admits to ecchymosis on R upper thigh  Musculoskeletal: admits to R shoulder pain, denies joint swelling, or muscle weakness  Neurologic: denies loss of sensation, numbness, or tingling

## 2022-01-31 ENCOUNTER — TRANSCRIPTION ENCOUNTER (OUTPATIENT)
Age: 52
End: 2022-01-31

## 2022-01-31 DIAGNOSIS — I31.9 DISEASE OF PERICARDIUM, UNSPECIFIED: ICD-10-CM

## 2022-01-31 DIAGNOSIS — I26.99 OTHER PULMONARY EMBOLISM WITHOUT ACUTE COR PULMONALE: ICD-10-CM

## 2022-01-31 LAB
ALBUMIN SERPL ELPH-MCNC: 2.8 G/DL — LOW (ref 3.3–5)
ALP SERPL-CCNC: 81 U/L — SIGNIFICANT CHANGE UP (ref 40–120)
ALT FLD-CCNC: 38 U/L — SIGNIFICANT CHANGE UP (ref 12–78)
ANION GAP SERPL CALC-SCNC: 5 MMOL/L — SIGNIFICANT CHANGE UP (ref 5–17)
APTT BLD: 82.6 SEC — HIGH (ref 27.5–35.5)
APTT BLD: 87.9 SEC — HIGH (ref 27.5–35.5)
AST SERPL-CCNC: 14 U/L — LOW (ref 15–37)
BASOPHILS # BLD AUTO: 0.03 K/UL — SIGNIFICANT CHANGE UP (ref 0–0.2)
BASOPHILS NFR BLD AUTO: 0.2 % — SIGNIFICANT CHANGE UP (ref 0–2)
BILIRUB SERPL-MCNC: 1.2 MG/DL — SIGNIFICANT CHANGE UP (ref 0.2–1.2)
BUN SERPL-MCNC: 6 MG/DL — LOW (ref 7–23)
CALCIUM SERPL-MCNC: 8.4 MG/DL — LOW (ref 8.5–10.1)
CHLORIDE SERPL-SCNC: 103 MMOL/L — SIGNIFICANT CHANGE UP (ref 96–108)
CK MB BLD-MCNC: 1.1 % — SIGNIFICANT CHANGE UP (ref 0–3.5)
CK MB BLD-MCNC: <1.1 % — SIGNIFICANT CHANGE UP (ref 0–3.5)
CK MB BLD-MCNC: <1.5 % — SIGNIFICANT CHANGE UP (ref 0–3.5)
CK MB BLD-MCNC: <1.5 % — SIGNIFICANT CHANGE UP (ref 0–3.5)
CK MB CFR SERPL CALC: 1.1 NG/ML — SIGNIFICANT CHANGE UP (ref 0–3.6)
CK MB CFR SERPL CALC: <1 NG/ML — SIGNIFICANT CHANGE UP (ref 0–3.6)
CK SERPL-CCNC: 67 U/L — SIGNIFICANT CHANGE UP (ref 26–192)
CK SERPL-CCNC: 68 U/L — SIGNIFICANT CHANGE UP (ref 26–192)
CK SERPL-CCNC: 90 U/L — SIGNIFICANT CHANGE UP (ref 26–192)
CK SERPL-CCNC: 97 U/L — SIGNIFICANT CHANGE UP (ref 26–192)
CO2 SERPL-SCNC: 26 MMOL/L — SIGNIFICANT CHANGE UP (ref 22–31)
CREAT SERPL-MCNC: 0.84 MG/DL — SIGNIFICANT CHANGE UP (ref 0.5–1.3)
D DIMER BLD IA.RAPID-MCNC: 1035 NG/ML DDU — HIGH
EOSINOPHIL # BLD AUTO: 0.02 K/UL — SIGNIFICANT CHANGE UP (ref 0–0.5)
EOSINOPHIL NFR BLD AUTO: 0.1 % — SIGNIFICANT CHANGE UP (ref 0–6)
FERRITIN SERPL-MCNC: 297 NG/ML — HIGH (ref 15–150)
FOLATE SERPL-MCNC: 6.3 NG/ML — SIGNIFICANT CHANGE UP
GLUCOSE SERPL-MCNC: 111 MG/DL — HIGH (ref 70–99)
HCT VFR BLD CALC: 28.8 % — LOW (ref 34.5–45)
HCT VFR BLD CALC: 28.9 % — LOW (ref 34.5–45)
HCT VFR BLD CALC: 29 % — LOW (ref 34.5–45)
HCT VFR BLD CALC: 29.1 % — LOW (ref 34.5–45)
HGB BLD-MCNC: 9.1 G/DL — LOW (ref 11.5–15.5)
HGB BLD-MCNC: 9.2 G/DL — LOW (ref 11.5–15.5)
HGB BLD-MCNC: 9.3 G/DL — LOW (ref 11.5–15.5)
HGB BLD-MCNC: 9.3 G/DL — LOW (ref 11.5–15.5)
IMM GRANULOCYTES NFR BLD AUTO: 0.4 % — SIGNIFICANT CHANGE UP (ref 0–1.5)
IRON SATN MFR SERPL: 19 UG/DL — LOW (ref 30–160)
IRON SATN MFR SERPL: 7 % — LOW (ref 14–50)
LYMPHOCYTES # BLD AUTO: 15.5 % — SIGNIFICANT CHANGE UP (ref 13–44)
LYMPHOCYTES # BLD AUTO: 2.1 K/UL — SIGNIFICANT CHANGE UP (ref 1–3.3)
MAGNESIUM SERPL-MCNC: 2.1 MG/DL — SIGNIFICANT CHANGE UP (ref 1.6–2.6)
MCHC RBC-ENTMCNC: 26.8 PG — LOW (ref 27–34)
MCHC RBC-ENTMCNC: 27 PG — SIGNIFICANT CHANGE UP (ref 27–34)
MCHC RBC-ENTMCNC: 27.1 PG — SIGNIFICANT CHANGE UP (ref 27–34)
MCHC RBC-ENTMCNC: 27.1 PG — SIGNIFICANT CHANGE UP (ref 27–34)
MCHC RBC-ENTMCNC: 31.5 GM/DL — LOW (ref 32–36)
MCHC RBC-ENTMCNC: 31.9 GM/DL — LOW (ref 32–36)
MCHC RBC-ENTMCNC: 32 GM/DL — SIGNIFICANT CHANGE UP (ref 32–36)
MCHC RBC-ENTMCNC: 32.1 GM/DL — SIGNIFICANT CHANGE UP (ref 32–36)
MCV RBC AUTO: 84.3 FL — SIGNIFICANT CHANGE UP (ref 80–100)
MCV RBC AUTO: 84.5 FL — SIGNIFICANT CHANGE UP (ref 80–100)
MCV RBC AUTO: 84.7 FL — SIGNIFICANT CHANGE UP (ref 80–100)
MCV RBC AUTO: 85.3 FL — SIGNIFICANT CHANGE UP (ref 80–100)
MONOCYTES # BLD AUTO: 1.08 K/UL — HIGH (ref 0–0.9)
MONOCYTES NFR BLD AUTO: 8 % — SIGNIFICANT CHANGE UP (ref 2–14)
NEUTROPHILS # BLD AUTO: 10.27 K/UL — HIGH (ref 1.8–7.4)
NEUTROPHILS NFR BLD AUTO: 75.8 % — SIGNIFICANT CHANGE UP (ref 43–77)
NRBC # BLD: 0 /100 WBCS — SIGNIFICANT CHANGE UP (ref 0–0)
NT-PROBNP SERPL-SCNC: 1329 PG/ML — HIGH (ref 0–125)
PHOSPHATE SERPL-MCNC: 3.3 MG/DL — SIGNIFICANT CHANGE UP (ref 2.5–4.5)
PLATELET # BLD AUTO: 505 K/UL — HIGH (ref 150–400)
PLATELET # BLD AUTO: 512 K/UL — HIGH (ref 150–400)
PLATELET # BLD AUTO: 518 K/UL — HIGH (ref 150–400)
PLATELET # BLD AUTO: 526 K/UL — HIGH (ref 150–400)
POTASSIUM SERPL-MCNC: 3.6 MMOL/L — SIGNIFICANT CHANGE UP (ref 3.5–5.3)
POTASSIUM SERPL-SCNC: 3.6 MMOL/L — SIGNIFICANT CHANGE UP (ref 3.5–5.3)
PROT SERPL-MCNC: 8.2 G/DL — SIGNIFICANT CHANGE UP (ref 6–8.3)
RBC # BLD: 3.39 M/UL — LOW (ref 3.8–5.2)
RBC # BLD: 3.4 M/UL — LOW (ref 3.8–5.2)
RBC # BLD: 3.43 M/UL — LOW (ref 3.8–5.2)
RBC # BLD: 3.45 M/UL — LOW (ref 3.8–5.2)
RBC # FLD: 15.6 % — HIGH (ref 10.3–14.5)
RBC # FLD: 15.7 % — HIGH (ref 10.3–14.5)
SODIUM SERPL-SCNC: 134 MMOL/L — LOW (ref 135–145)
TIBC SERPL-MCNC: 268 UG/DL — SIGNIFICANT CHANGE UP (ref 220–430)
TROPONIN I, HIGH SENSITIVITY RESULT: 733.3 NG/L — HIGH
TROPONIN I, HIGH SENSITIVITY RESULT: 827.3 NG/L — HIGH
TROPONIN I, HIGH SENSITIVITY RESULT: 848.5 NG/L — HIGH
TROPONIN I, HIGH SENSITIVITY RESULT: 960.6 NG/L — HIGH
UIBC SERPL-MCNC: 249 UG/DL — SIGNIFICANT CHANGE UP (ref 110–370)
VIT B12 SERPL-MCNC: 592 PG/ML — SIGNIFICANT CHANGE UP (ref 232–1245)
WBC # BLD: 13.56 K/UL — HIGH (ref 3.8–10.5)
WBC # BLD: 15.38 K/UL — HIGH (ref 3.8–10.5)
WBC # BLD: 16.21 K/UL — HIGH (ref 3.8–10.5)
WBC # BLD: 16.26 K/UL — HIGH (ref 3.8–10.5)
WBC # FLD AUTO: 13.56 K/UL — HIGH (ref 3.8–10.5)
WBC # FLD AUTO: 15.38 K/UL — HIGH (ref 3.8–10.5)
WBC # FLD AUTO: 16.21 K/UL — HIGH (ref 3.8–10.5)
WBC # FLD AUTO: 16.26 K/UL — HIGH (ref 3.8–10.5)

## 2022-01-31 PROCEDURE — 93306 TTE W/DOPPLER COMPLETE: CPT | Mod: 26

## 2022-01-31 PROCEDURE — 99233 SBSQ HOSP IP/OBS HIGH 50: CPT | Mod: GC

## 2022-01-31 PROCEDURE — 93010 ELECTROCARDIOGRAM REPORT: CPT

## 2022-01-31 PROCEDURE — 71275 CT ANGIOGRAPHY CHEST: CPT | Mod: 26

## 2022-01-31 PROCEDURE — 99233 SBSQ HOSP IP/OBS HIGH 50: CPT

## 2022-01-31 RX ORDER — HYDROMORPHONE HYDROCHLORIDE 2 MG/ML
0.5 INJECTION INTRAMUSCULAR; INTRAVENOUS; SUBCUTANEOUS ONCE
Refills: 0 | Status: DISCONTINUED | OUTPATIENT
Start: 2022-01-31 | End: 2022-01-31

## 2022-01-31 RX ORDER — MORPHINE SULFATE 50 MG/1
2 CAPSULE, EXTENDED RELEASE ORAL ONCE
Refills: 0 | Status: DISCONTINUED | OUTPATIENT
Start: 2022-01-31 | End: 2022-01-31

## 2022-01-31 RX ORDER — ACETAMINOPHEN 500 MG
325 TABLET ORAL ONCE
Refills: 0 | Status: COMPLETED | OUTPATIENT
Start: 2022-01-31 | End: 2022-01-31

## 2022-01-31 RX ORDER — CLOPIDOGREL BISULFATE 75 MG/1
75 TABLET, FILM COATED ORAL DAILY
Refills: 0 | Status: DISCONTINUED | OUTPATIENT
Start: 2022-01-31 | End: 2022-02-05

## 2022-01-31 RX ORDER — ONDANSETRON 8 MG/1
4 TABLET, FILM COATED ORAL EVERY 6 HOURS
Refills: 0 | Status: DISCONTINUED | OUTPATIENT
Start: 2022-01-31 | End: 2022-02-05

## 2022-01-31 RX ORDER — ASPIRIN/CALCIUM CARB/MAGNESIUM 324 MG
325 TABLET ORAL ONCE
Refills: 0 | Status: COMPLETED | OUTPATIENT
Start: 2022-01-31 | End: 2022-01-31

## 2022-01-31 RX ADMIN — Medication 325 MILLIGRAM(S): at 07:58

## 2022-01-31 RX ADMIN — LIDOCAINE 1 PATCH: 4 CREAM TOPICAL at 18:41

## 2022-01-31 RX ADMIN — HEPARIN SODIUM 2000 UNIT(S)/HR: 5000 INJECTION INTRAVENOUS; SUBCUTANEOUS at 06:24

## 2022-01-31 RX ADMIN — HEPARIN SODIUM 2000 UNIT(S)/HR: 5000 INJECTION INTRAVENOUS; SUBCUTANEOUS at 18:34

## 2022-01-31 RX ADMIN — ATORVASTATIN CALCIUM 10 MILLIGRAM(S): 80 TABLET, FILM COATED ORAL at 00:09

## 2022-01-31 RX ADMIN — HEPARIN SODIUM 2000 UNIT(S)/HR: 5000 INJECTION INTRAVENOUS; SUBCUTANEOUS at 13:33

## 2022-01-31 RX ADMIN — HYDROMORPHONE HYDROCHLORIDE 0.5 MILLIGRAM(S): 2 INJECTION INTRAMUSCULAR; INTRAVENOUS; SUBCUTANEOUS at 18:31

## 2022-01-31 RX ADMIN — HEPARIN SODIUM 2000 UNIT(S)/HR: 5000 INJECTION INTRAVENOUS; SUBCUTANEOUS at 20:45

## 2022-01-31 RX ADMIN — Medication 325 MILLIGRAM(S): at 05:51

## 2022-01-31 RX ADMIN — MORPHINE SULFATE 2 MILLIGRAM(S): 50 CAPSULE, EXTENDED RELEASE ORAL at 14:39

## 2022-01-31 RX ADMIN — LIDOCAINE 1 PATCH: 4 CREAM TOPICAL at 07:00

## 2022-01-31 RX ADMIN — Medication 650 MILLIGRAM(S): at 19:50

## 2022-01-31 RX ADMIN — Medication 650 MILLIGRAM(S): at 07:57

## 2022-01-31 RX ADMIN — Medication 650 MILLIGRAM(S): at 18:36

## 2022-01-31 RX ADMIN — Medication 150 MICROGRAM(S): at 06:23

## 2022-01-31 RX ADMIN — HYDROMORPHONE HYDROCHLORIDE 0.5 MILLIGRAM(S): 2 INJECTION INTRAMUSCULAR; INTRAVENOUS; SUBCUTANEOUS at 22:18

## 2022-01-31 RX ADMIN — Medication 325 MILLIGRAM(S): at 01:41

## 2022-01-31 RX ADMIN — HYDROMORPHONE HYDROCHLORIDE 0.5 MILLIGRAM(S): 2 INJECTION INTRAMUSCULAR; INTRAVENOUS; SUBCUTANEOUS at 17:31

## 2022-01-31 RX ADMIN — LIDOCAINE 1 PATCH: 4 CREAM TOPICAL at 13:23

## 2022-01-31 RX ADMIN — ATORVASTATIN CALCIUM 10 MILLIGRAM(S): 80 TABLET, FILM COATED ORAL at 21:24

## 2022-01-31 RX ADMIN — CLOPIDOGREL BISULFATE 75 MILLIGRAM(S): 75 TABLET, FILM COATED ORAL at 18:36

## 2022-01-31 RX ADMIN — LIDOCAINE 1 PATCH: 4 CREAM TOPICAL at 09:00

## 2022-01-31 RX ADMIN — MORPHINE SULFATE 2 MILLIGRAM(S): 50 CAPSULE, EXTENDED RELEASE ORAL at 13:39

## 2022-01-31 NOTE — CONSULT NOTE ADULT - SUBJECTIVE AND OBJECTIVE BOX
This is a 51F obese, former smoker, recent spontaneous coronary artery dissection s/p POBA to OM1 @ [facility] () who presents to the ED with R upper leg pain and found to have an acute R saphenous/CFV DVT, B/L PE without RV strain and possible post-MI pericarditis.  At [facility], she was cathed via the R groin and was angiosealed.  According to the patient, the ecchymosis has become larger and more painful which prompted the visit to the ER.  After findings of DVT/PE, patient started on a heparin gtt.      ICU consulted for further management.  On exam, patient with 5/10 persistent chest pain that is relieved by leaning forward and worse with laying back and taking deep breaths.  VItals have remained stable, without hypoxia or difficulty breathing.  RLE does not appear to be firm or tense.  Slight pain to R groin cath site.  Good pulses distally.  TTE pending.  Currently, she denies LOC, dizziness, SOB, difficulty breathing, syncope, vision changes, headache nausea or vomiting.      Subjective:    Review of Systems         [x] A ten-point review of systems was otherwise negative except as noted.  [ ] Due to altered mental status/intubation, subjective information were not able to be obtained from the patient. History was obtained, to the extent possible, from review of the chart and collateral sources of information.      Patient is a 51y old  Female who presents with a chief complaint of R above the knee DVT (2022 09:22)    HPI:  52 yo female with PMHx of CAD s/p POBA to OM1 followed by PCI c/w SCAD, hypothyroidism, former smoker presents to the ED for R upper leg pain. Patient states she noticed ecchymosis of R upper thigh since past few days with no associated pain, but slight discomfort. Patient states she never noticed this before and decided to wait and noticed that her bruise was getting bigger and more uncomfortable. Patient states she took Tylenol for slight discomfort. Patient reports feeling R shoulder pain and states she feels like she pulled a muscle while sitting upright. States she had x-rays done at [facility] during her last hospitalization and results were normal. Otherwise patient denies dizziness, headache, chest pain, SOB, abdominal pain, n/v/d.   Of note patient underwent cardiac cath for nstemi at [facility] on 22, with no complications and patient follows with Dr. Eliza gordon.   Of note patient diagnosed with COVID 19 on 22, COVID negative on admission     ED course:  Vitals: T102.6, , /80, RR 18, SPO2%: 100%  Labs: Hgb 9.3, HCT 29.3, Plt 543, PT 16, INR 1.39, Urinanalysis with trace LE, no nitrites and few bacteria   CT A/P w/ IV contrast: Small hemorrhage in the extraperitoneal space of the right pelvis along the right pelvic sidewall without discrete collection .Small pericardial effusion with concern for mild pericardial enhancement which may be infectious or inflammatory. A 1.6 cm indeterminate left hepatic lobe lesion.  Doppler US LE: Acute deep venous thrombosis at the right greater saphenous vein/common femoral vein junction.  Given in the ED: Lovenox 110mg subq x 1 (2022 19:20)    PAST MEDICAL & SURGICAL HISTORY:  Hypothyroidism    CAD (coronary artery disease)    NSTEMI (non-ST elevation myocardial infarction)    S/P cardiac catheterization  &amp; balloon angioplasty with thrombotic lesion in OM1      FAMILY HISTORY:  FH: type 2 diabetes (Mother)    FH: HTN (hypertension) (Mother)    FH: hyperlipidemia (Mother)        Vitals   ICU Vital Signs Last 24 Hrs  T(C): 37.6 (2022 08:00), Max: 38.2 (2022 00:10)  T(F): 99.6 (2022 08:00), Max: 100.7 (2022 00:10)  HR: 94 (2022 08:00) (89 - 101)  BP: 109/741 (2022 08:00) (106/70 - 124/78)  BP(mean): --  ABP: --  ABP(mean): --  RR: 18 (2022 08:00) (16 - 18)  SpO2: 96% (2022 08:00) (96% - 100%)      Physical Exam:   Constitutional: Moderate pain, well-groomed, obese  HEENT: PERRLA, EOMI, no drainage or redness  Neck: supple,  No JVD, Trachea midline  Respiratory: Breath Sounds equal & clear bilaterally to auscultation, no accessory muscle use noted  Cardiovascular: Regular rate, regular rhythm, normal S1, S2; no murmurs or rub  Gastrointestinal: Soft, non-tender, non distended, no hepatosplenomegaly, normal bowel sounds  Extremities: VALDES x 4, no peripheral edema, no cyanosis, no clubbing   Vascular: Equal and normal pulses: 2+ peripheral pulses throughout  Neurological: A+O x 3; speech clear and intact; no sensory, motor  deficits, normal reflexes  Psychiatric: calm, normal mood, normal affect  Musculoskeletal: No joint swelling or deformity; no limitation of movement  Skin: warm, dry, well perfused, no rashes    VENT SETTINGS         I&O's Detail      LABS                        9.1    13.56 )-----------( 518      ( 2022 06:40 )             28.9         134<L>  |  103  |  6<L>  ----------------------------<  111<H>  3.6   |  26  |  0.84    Ca    8.4<L>      2022 06:40  Phos  3.3       Mg     2.1         TPro  8.2  /  Alb  2.8<L>  /  TBili  1.2  /  DBili  x   /  AST  14<L>  /  ALT  38  /  AlkPhos  81      LIVER FUNCTIONS - ( 2022 06:40 )  Alb: 2.8 g/dL / Pro: 8.2 g/dL / ALK PHOS: 81 U/L / ALT: 38 U/L / AST: 14 U/L / GGT: x           PT/INR - ( 2022 11:45 )   PT: 16.0 sec;   INR: 1.39 ratio         PTT - ( 2022 11:45 )  PTT:30.4 sec  CARDIAC MARKERS ( 2022 09:37 )  CK67 U/L / CKMB<1.0 ng/mL / Troponin Tx     /  CARDIAC MARKERS ( 2022 06:40 )  CK68 U/L / CKMB<1.0 ng/mL / Troponin Tx     /        Urinalysis Basic - ( 2022 13:44 )    Color: Yellow / Appearance: Slightly Turbid / S.010 / pH: x  Gluc: x / Ketone: Negative  / Bili: Negative / Urobili: 8   Blood: x / Protein: 30 mg/dL / Nitrite: Negative   Leuk Esterase: Trace / RBC: 6-10 /HPF / WBC 6-10   Sq Epi: x / Non Sq Epi: Occasional / Bacteria: Few            MEDICATIONS  (STANDING):  aspirin  chewable 81 milliGRAM(s) Oral daily  atorvastatin 10 milliGRAM(s) Oral at bedtime  heparin  Infusion.  Unit(s)/Hr (20 mL/Hr) IV Continuous <Continuous>  levothyroxine 150 MICROGram(s) Oral daily  lidocaine   4% Patch 1 Patch Transdermal daily  lisinopril 2.5 milliGRAM(s) Oral daily  metoprolol succinate ER 25 milliGRAM(s) Oral daily    MEDICATIONS  (PRN):  acetaminophen     Tablet .. 650 milliGRAM(s) Oral every 8 hours PRN Mild Pain (1 - 3)  heparin   Injectable 9000 Unit(s) IV Push every 6 hours PRN For aPTT less than 40  heparin   Injectable 4500 Unit(s) IV Push every 6 hours PRN For aPTT between 40 - 57      Allergies:  No Known Allergies

## 2022-01-31 NOTE — PROGRESS NOTE ADULT - SUBJECTIVE AND OBJECTIVE BOX
Patient is a 51y old  Female who presents with a chief complaint of R above the knee DVT (2022 14:49)      INTERVAL HPI/OVERNIGHT EVENTS: Patient seen and examined at bedside. Patient admitted overnight for RLE DVT. Overnight, pt was c/o chest pressure - had elevated troponin. Pt reports that her chest pain is pleuritic and is relieved with positional changes like sitting upright. Denies fevers, chills, headache, dyspnea, abdominal pain, n/v/d/c.    MEDICATIONS  (STANDING):  atorvastatin 10 milliGRAM(s) Oral at bedtime  clopidogrel Tablet 75 milliGRAM(s) Oral daily  heparin  Infusion.  Unit(s)/Hr (20 mL/Hr) IV Continuous <Continuous>  levothyroxine 150 MICROGram(s) Oral daily  lidocaine   4% Patch 1 Patch Transdermal daily  lisinopril 2.5 milliGRAM(s) Oral daily  metoprolol succinate ER 25 milliGRAM(s) Oral daily    MEDICATIONS  (PRN):  acetaminophen     Tablet .. 650 milliGRAM(s) Oral every 8 hours PRN Mild Pain (1 - 3)  heparin   Injectable 9000 Unit(s) IV Push every 6 hours PRN For aPTT less than 40  heparin   Injectable 4500 Unit(s) IV Push every 6 hours PRN For aPTT between 40 - 57      Allergies    No Known Allergies    Intolerances        REVIEW OF SYSTEMS:  CONSTITUTIONAL: No fever or chills  HEENT:  No headache  RESPIRATORY: No shortness of breath  CARDIOVASCULAR: No chest pain  GASTROINTESTINAL: No abd pain, nausea, vomiting, or diarrhea  GENITOURINARY: No dysuria, frequency, or hematuria  NEUROLOGICAL: no focal weakness or dizziness  MUSCULOSKELETAL: no new myalgias     Vital Signs Last 24 Hrs  T(C): 37.6 (2022 08:00), Max: 38.2 (2022 00:10)  T(F): 99.6 (2022 08:00), Max: 100.7 (2022 00:10)  HR: 90 (2022 14:07) (89 - 101)  BP: 117/57 (2022 14:07) (106/70 - 124/78)  BP(mean): --  RR: 16 (2022 14:07) (16 - 18)  SpO2: 96% (2022 08:00) (96% - 100%)    PHYSICAL EXAM:  GENERAL: NAD, comfortable appearing  HEENT:  anicteric, moist mucous membranes  CHEST/LUNG:  b/l breath sounds diminished but clear, no rales, wheezes, or rhonchi  HEART:  tachycardic, S1, S2, no murmurs, rubs, or gallops  ABDOMEN:  Soft, nontender, nondistended  EXTREMITIES: no pedal edema, cyanosis, or calf tenderness  NERVOUS SYSTEM: answers questions and follows commands appropriately    LABS:                        9.3    16.26 )-----------( 505      ( 2022 12:47 )             29.1     CBC Full  -  ( 2022 12:47 )  WBC Count : 16.26 K/uL  Hemoglobin : 9.3 g/dL  Hematocrit : 29.1 %  Platelet Count - Automated : 505 K/uL  Mean Cell Volume : 84.3 fl  Mean Cell Hemoglobin : 27.0 pg  Mean Cell Hemoglobin Concentration : 32.0 gm/dL  Auto Neutrophil # : x  Auto Lymphocyte # : x  Auto Monocyte # : x  Auto Eosinophil # : x  Auto Basophil # : x  Auto Neutrophil % : x  Auto Lymphocyte % : x  Auto Monocyte % : x  Auto Eosinophil % : x  Auto Basophil % : x    2022 06:40    134    |  103    |  6      ----------------------------<  111    3.6     |  26     |  0.84     Ca    8.4        2022 06:40  Phos  3.3       2022 06:40  Mg     2.1       2022 06:40    TPro  8.2    /  Alb  2.8    /  TBili  1.2    /  DBili  x      /  AST  14     /  ALT  38     /  AlkPhos  81     2022 06:40    PT/INR - ( 2022 11:45 )   PT: 16.0 sec;   INR: 1.39 ratio         PTT - ( 2022 12:47 )  PTT:82.6 sec  Urinalysis Basic - ( 2022 13:44 )    Color: Yellow / Appearance: Slightly Turbid / S.010 / pH: x  Gluc: x / Ketone: Negative  / Bili: Negative / Urobili: 8   Blood: x / Protein: 30 mg/dL / Nitrite: Negative   Leuk Esterase: Trace / RBC: 6-10 /HPF / WBC 6-10   Sq Epi: x / Non Sq Epi: Occasional / Bacteria: Few      CAPILLARY BLOOD GLUCOSE      D-Dimer Assay, Quantitative (22 @ 09:37)   D-Dimer Assay, Quantitative: 1035    hsTnI 960.6 --> 848.5 --> 827.3        RADIOLOGY & ADDITIONAL TESTS:    < from: CT Abdomen and Pelvis w/ IV Cont (22 @ 15:45) >    ACC: [id number] EXAM:  CT ABDOMEN AND PELVIS IC                          PROCEDURE DATE:  2022          INTERPRETATION:  CLINICAL INFORMATION: Status post cardiac catheter with   right groin hematoma. Evaluate for retroperitoneal bleed.    COMPARISON: None.    CONTRAST/COMPLICATIONS:  IV Contrast: Omnipaque 350  90 cc administered   10 cc discarded  Oral Contrast: NONE  Complications: None reported at time of study completion    PROCEDURE:  CT of the Abdomen and Pelvis was performed.  Sagittal and coronal reformats were performed.    FINDINGS:  LOWER CHEST: Small pericardial effusion with questionable enhancing   pericardium.    LIVER: A 1.6 cm indeterminate hypodense lesion in the left hepatic lobe.  BILE DUCTS: Normal caliber.  GALLBLADDER: Within normal limits.  SPLEEN: Within normal limits.  PANCREAS: Within normal limits.  ADRENALS: Within normal limits.  KIDNEYS/URETERS: Left renal cortical scarring.    BLADDER: Within normal limits.  REPRODUCTIVE ORGANS: Enlarged fibroid uterus. No adnexal mass.    BOWEL: No bowel obstruction.  PERITONEUM: No ascites.  VESSELS: Right greater saphenous vein is expanded and hypodense.   Heterogeneity of the right external iliac vein.  RETROPERITONEUM/LYMPH NODES: No lymphadenopathy. Small hemorrhage in the   extraperitoneal space of the right pelvis along the right pelvic side   wall. No discrete collection.  ABDOMINAL WALL: No right groin collection.  BONES: Degenerative changes.    IMPRESSION:  Small hemorrhage in the extraperitoneal space of the right pelvis along   the right pelvic sidewall without discrete collection.    Expanded right greater saphenous vein and heterogeneity of the right   external iliac vein raising concern for thrombosis. Correlation with   right lower extremity venous Doppler is recommended with specific   attention to these vessels.    Small pericardial effusion with concern for mild pericardial enhancement   which may be infectious or inflammatory.    A 1.6 cm indeterminate left hepatic lobe lesion.    --- End of Report ---            HIRO RCESPO MD; Attending Radiologist  This document has been electronically signed. 2022  4:12PM    < end of copied text >    < from: US Duplex Venous Lower Ext Complete, Bilateral (22 @ 17:42) >    ACC: [id number] EXAM:  US DPLX LWR EXT VEINS COMPL BI                          PROCEDURE DATE:  2022          INTERPRETATION:  CLINICAL INFORMATION: Status post cardiac catheter with   concern for greater saphenous vein clot at recent CT.    COMPARISON: CT 2022    TECHNIQUE: Duplex sonography of the BILATERAL LOWER extremity veins with   color and spectral Doppler, with and without compression.    FINDINGS:    RIGHT:  Acute thrombus is present within the greater saphenous vein extending to   the common femoral vein junction. Normal compressibility of the femoral   and popliteal veins.  Doppler examination shows normal spontaneous and phasic flow.  No RIGHT calf vein thrombosis is detected.    LEFT:  Normal compressibility of the LEFT common femoral, femoral and popliteal   veins.  Doppler examination shows normal spontaneous and phasic flow.  No LEFT calf vein thrombosis is detected.    IMPRESSION:  Acute deep venous thrombosis at the right greater saphenous vein/common   femoral vein junction.  Acute deep venous thrombosis: above the knee.    Findings were discussed with Dr. NEVA SHIPLEY  2022 5:55 PM by Dr. Crespo with read back confirmation.    --- End of Report ---            HIRO CRESPO MD; Attending Radiologist  This document has been electronically signed. 2022  5:57PM    < end of copied text >    < from: CT Angio Chest PE Protocol w/ IV Cont (22 @ 10:43) >    ACC: [id number] EXAM:  CT ANGIO CHEST PULM [facility]                          *** ADDENDUM***    This critical value was discussed with Dr. AIDA Mccarty  by telephone at   the time of interpretation on 2022.    --- End of Report ---    *** END OF ADDENDUM***      PROCEDURE DATE:  2022          INTERPRETATION:  CLINICAL INFORMATION: Chest pain. Newly diagnosed deep   venous thrombosis.  Evaluate for pulmonary embolism.    COMPARISON: None.    CONTRAST/COMPLICATIONS:  IV Contrast: Omnipaque 350  73 cc administered   27 cc discarded  Oral Contrast: NONE  Complications: None reported at time of study completion    PROCEDURE:  CT Angiography of the Chest.  Sagittal and coronal reformats were performed as well as 3D (MIP)   reconstructions.    FINDINGS:    LUNGS AND AIRWAYS:  PLEURA: Bibasilar dependent atelectatic changes and patchy subpleural   airspace consolidation.  Small left-sided pleural effusion underlying compressive atelectasis.    Atelectatic changes inferior aspect left lingula lobe.    The central airways remain patent.    3 mm subpleural nodule anterior right upper lobe.    MEDIASTINUM AND MICKEY:  Subcentimeter short axis prevascular, right paratracheal and subcarinal   lymph nodes.    VESSELS:  There are filling defects compatible with acute pulmonary emboli   involving segmental right lower lobe pulmonary arterial branches, left   lingula pulmonary artery and superior segment left lower lobe pulmonary   artery and medial basal segment left lower lobe pulmonaryarteries.    HEART: The heart is mildly enlarged, no CT evidence for right-sided heart   strain.  Small to moderate sized pericardial effusion.    CHEST WALL AND LOWER NECK:  Subcentimeter bilateral supraclavicular lymph nodes.  Bilateral axillary lymph nodes measuring up to 1 cm short axis.    VISUALIZED UPPER ABDOMEN:  Scarring upper pole left kidney.    BONES: Within normal limits.    IMPRESSION:    Bilateral pulmonary emboli involving segmental right lower lobe pulmonary   arterial branches, left lingula branch pulmonary artery and segmental   left lower lobe pulmonary arterial branches as discussed.  No CT evidence for right-sided heart strain.    Patchy atelectatic changes lung bases, cannot exclude developing   pulmonary infarct.  Small left-sided pleural effusion.    Small to moderate pericardial effusion, which is increased in size from   CT scan of the abdomen and pelvis performed on 2022.    Other findings as discussed above.    --- End of Report ---    ***Please see the addendum at the top of this report. It may contain   additional important information or changes.****        USHA GARCIA MD; Attending Radiologist  This document has been electronically signed. 2022 11:26AM  Addend:USHA GARCIA MD; Attending Radiologist  This addendum was electronically signed on: 2022 11:30AM.    < end of copied text >      Personally reviewed.     Consultant(s) Notes Reviewed:  [x] YES  [ ] NO

## 2022-01-31 NOTE — DISCHARGE NOTE PROVIDER - CARE PROVIDERS DIRECT ADDRESSES
[email or web address]

## 2022-01-31 NOTE — PROGRESS NOTE ADULT - SUBJECTIVE AND OBJECTIVE BOX
Patient diagnosed with b/l PE.  She needs full dose AC.  Given the she had SCAD, POBA, and no PCI, we do not need triple therapy.  Stop aspirin, continue Plavix and full dose AC.  Pain may also be related to post MI pericarditis given effusion.  I do not think she had a coronary perforation  Trend H/H as she is at risk for bleeding.  Repeat echo, and the echo may need to be trended to monitor the size of the effusion.  Will follow closely while admitted.

## 2022-01-31 NOTE — PROGRESS NOTE ADULT - ATTENDING COMMENTS
IF bleeding stable, plan to resume Plavix 75 qd along with aspirin and eventual DOAC. Plan triple therapy for one month, followed by ELiquis and Plavix alone.  Check echo.  Patient may have post MI pericarditis vs pain from possible PE.  Hold off on high dose aspirin for now as this will further increase risk of bleeding.  Need to trend H/H closely.  Heparin gtt with goal PTT as close to 60 as possible as patient is at high risk for bleeding event.  To follow closely. A t risk for abrupt decompensation.

## 2022-01-31 NOTE — DISCHARGE NOTE PROVIDER - NSDCFUSCHEDAPPT_GEN_ALL_CORE_FT
YU RED ; 02/01/2022 ; [facility]

## 2022-01-31 NOTE — DISCHARGE NOTE PROVIDER - PROVIDER TOKENS
PROVIDER:[TOKEN:[[id number]],ESTABLISHEDPATIENT:[T]],PROVIDER:[TOKEN:[[id number]]] PROVIDER:[TOKEN:[[id number]],ESTABLISHEDPATIENT:[T]],PROVIDER:[TOKEN:[[id number]]],PROVIDER:[TOKEN:[[id number]]]

## 2022-01-31 NOTE — DISCHARGE NOTE PROVIDER - ATTENDING DISCHARGE PHYSICAL EXAMINATION:
GENERAL: NAD, resting comfortably  HEENT:  anicteric, moist mucous membranes  CHEST/LUNG:  b/l breath sounds diminished but clear, no rales, wheezes, or rhonchi  HEART:  tachy, S1, S2, no murmurs, rubs, or gallops  ABDOMEN:  Soft, nontender, nondistended  EXTREMITIES: no pedal edema, cyanosis, or calf tenderness  NERVOUS SYSTEM: answers questions and follows commands appropriately

## 2022-01-31 NOTE — DISCHARGE NOTE PROVIDER - NSDCMRMEDTOKEN_GEN_ALL_CORE_FT
acetaminophen 325 mg oral tablet: 2 tab(s) orally every 6 hours, As needed, Moderate Pain (4 - 6)  aspirin 81 mg oral tablet, chewable: 1 tab(s) orally once a day  atorvastatin 10 mg oral tablet: 1 tab(s) orally once a day (at bedtime)  ergocalciferol 1.25 mg (50,000 intl units) oral capsule: 1 cap(s) orally once a week  levothyroxine 150 mcg (0.15 mg) oral tablet: 1 tab(s) orally once a day  lisinopril 2.5 mg oral tablet: 1 tab(s) orally once a day  metoprolol succinate 25 mg oral tablet, extended release: 1 tab(s) orally once a day   acetaminophen 325 mg oral tablet: 2 tab(s) orally every 6 hours, As needed, Moderate Pain (4 - 6)  atorvastatin 10 mg oral tablet: 1 tab(s) orally once a day (at bedtime)  Eliquis Starter Pack for Treatment of DVT and PE 5 mg oral tablet: Use as directed   ergocalciferol 1.25 mg (50,000 intl units) oral capsule: 1 cap(s) orally once a week  levothyroxine 150 mcg (0.15 mg) oral tablet: 1 tab(s) orally once a day  lisinopril 2.5 mg oral tablet: 1 tab(s) orally once a day  metoprolol succinate 25 mg oral tablet, extended release: 1 tab(s) orally once a day  Plavix 75 mg oral tablet: 1 tab(s) orally once a day   acetaminophen 325 mg oral tablet: 2 tab(s) orally every 6 hours, As needed, Moderate Pain (4 - 6)  atorvastatin 10 mg oral tablet: 1 tab(s) orally once a day (at bedtime)  Eliquis Starter Pack for Treatment of DVT and PE 5 mg oral tablet: Use as directed   ergocalciferol 1.25 mg (50,000 intl units) oral capsule: 1 cap(s) orally once a week  levothyroxine 150 mcg (0.15 mg) oral tablet: 1 tab(s) orally once a day  Lipitor 80 mg oral tablet: 1 tab(s) orally once a day (at bedtime)   lisinopril 2.5 mg oral tablet: 1 tab(s) orally once a day  metoprolol succinate 25 mg oral tablet, extended release: 1 tab(s) orally once a day  Plavix 75 mg oral tablet: 1 tab(s) orally once a day   acetaminophen 325 mg oral tablet: 2 tab(s) orally every 6 hours, As needed, Moderate Pain (4 - 6)  atorvastatin 10 mg oral tablet: 1 tab(s) orally once a day (at bedtime)  colchicine 0.6 mg oral tablet: 1 tab(s) orally every 12 hours  Eliquis Starter Pack for Treatment of DVT and PE 5 mg oral tablet: Use as directed   ergocalciferol 1.25 mg (50,000 intl units) oral capsule: 1 cap(s) orally once a week  levothyroxine 150 mcg (0.15 mg) oral tablet: 1 tab(s) orally once a day  lisinopril 2.5 mg oral tablet: 1 tab(s) orally once a day  metoprolol succinate 25 mg oral tablet, extended release: 1 tab(s) orally once a day  Plavix 75 mg oral tablet: 1 tab(s) orally once a day

## 2022-01-31 NOTE — PROGRESS NOTE ADULT - ATTENDING COMMENTS
50 yo female with PMHx of hypothyroidism, CAD/NSTEMI s/p POBA to OM1 followed by PCI c/w SCAD, former smoker admitted for R acute DVT at greater saphenous vein/common femoral vein junction and now b/l PE Plan: monitor clinical course, apprec pulm, cardio and vascular recs, cont full dose AC, monitor on tele, apprec ICU eval, monitor h/h

## 2022-01-31 NOTE — PROGRESS NOTE ADULT - SUBJECTIVE AND OBJECTIVE BOX
VASCULAR SURGERY PA NOTE:    S: Patient seen and examined sitting in bedside chair in ED.  Reports persistent, midsternal chest pain that radiates to the superior right aspect of the chest - worsens with deep inspiration.  Denies SOB, palpitations, dizziness, lightheadedness.  S/P CTA chest earlier this morning that demonstrated bilat segmental PE's.  Currently on heparin gtt for RLE DVT.      MEDICATIONS:  acetaminophen     Tablet .. 650 milliGRAM(s) Oral every 8 hours PRN  atorvastatin 10 milliGRAM(s) Oral at bedtime  heparin   Injectable 9000 Unit(s) IV Push every 6 hours PRN  heparin   Injectable 4500 Unit(s) IV Push every 6 hours PRN  heparin  Infusion.  Unit(s)/Hr IV Continuous <Continuous>  levothyroxine 150 MICROGram(s) Oral daily  lidocaine   4% Patch 1 Patch Transdermal daily  lisinopril 2.5 milliGRAM(s) Oral daily  metoprolol succinate ER 25 milliGRAM(s) Oral daily    O:  Vital Signs Last 24 Hrs  T(C): 37.6 (31 Jan 2022 08:00), Max: 38.2 (31 Jan 2022 00:10)  T(F): 99.6 (31 Jan 2022 08:00), Max: 100.7 (31 Jan 2022 00:10)  HR: 94 (31 Jan 2022 08:00) (89 - 101)  BP: 109/741 (31 Jan 2022 08:00) (106/70 - 124/78)  RR: 18 (31 Jan 2022 08:00) (16 - 18)  SpO2: 96% (31 Jan 2022 08:00) (96% - 100%)    PHYSICAL EXAM:  Lungs: CTA bilat without W/R/R  Card: S1S2, borderline tachycardia at 96 bpm   Abd: Soft, NT, ND.  +BS x 4.  No rebound/guarding.  No pulsatile masses, fluctuance, or fluid collection noted at right groin - 2+ femoral pulses bilat.    Ext: Mild swelling RLE, no appreciable calf/thigh tenderness bilat LE    LABS:                        9.3    16.26 )-----------( 505      ( 31 Jan 2022 12:47 )             29.1     01-31    134<L>  |  103  |  6<L>  ----------------------------<  111<H>  3.6   |  26  |  0.84    Ca    8.4<L>      31 Jan 2022 06:40  Phos  3.3     01-31  Mg     2.1     01-31    TPro  8.2  /  Alb  2.8<L>  /  TBili  1.2  /  DBili  x   /  AST  14<L>  /  ALT  38  /  AlkPhos  81  01-31  PT/INR - ( 30 Jan 2022 11:45 )   PT: 16.0 sec;   INR: 1.39 ratio    PTT - ( 31 Jan 2022 12:47 )  PTT:82.6 sec    RADIOLOGY:  ACC: [id number] EXAM:  CT ANGIO CHEST PULM [facility]                        This critical value was discussed with Dr. AIDA Mccarty  by telephone at   the time of interpretation on 1/31/2022.  PROCEDURE DATE:  01/31/2022    INTERPRETATION:  CLINICAL INFORMATION: Chest pain. Newly diagnosed deep   venous thrombosis.  Evaluate for pulmonary embolism.  COMPARISON: None.  CONTRAST/COMPLICATIONS:  IV Contrast: Omnipaque 350  73 cc administered   27 cc discarded  Oral Contrast: NONE  Complications: None reported at time of study completion  PROCEDURE:  CT Angiography of the Chest.  Sagittal and coronal reformats were performed as well as 3D (MIP)   reconstructions.  FINDINGS:  LUNGS AND AIRWAYS:  PLEURA: Bibasilar dependent atelectatic changes and patchy subpleural   airspace consolidation.  Small left-sided pleural effusion underlying compressive atelectasis.  Atelectatic changes inferior aspect left lingula lobe.  The central airways remain patent.  3 mm subpleural nodule anterior right upper lobe.  MEDIASTINUM AND MICKEY:  Subcentimeter short axis prevascular, right paratracheal and subcarinal   lymph nodes.  VESSELS:  There are filling defects compatible with acute pulmonary emboli   involving segmental right lower lobe pulmonary arterial branches, left   lingula pulmonary artery and superior segment left lower lobe pulmonary   artery and medial basal segment left lower lobe pulmonaryarteries.  HEART: The heart is mildly enlarged, no CT evidence for right-sided heart   strain.  Small to moderate sized pericardial effusion.  CHEST WALL AND LOWER NECK:  Subcentimeter bilateral supraclavicular lymph nodes.  Bilateral axillary lymph nodes measuring up to 1 cm short axis.  VISUALIZED UPPER ABDOMEN:  Scarring upper pole left kidney.  BONES: Within normal limits.  IMPRESSION:  Bilateral pulmonary emboli involving segmental right lower lobe pulmonary   arterial branches, left lingula branch pulmonary artery and segmental   left lower lobe pulmonary arterial branches as discussed.  No CT evidence for right-sided heart strain.  Patchy atelectatic changes lung bases, cannot exclude developing   pulmonary infarct.  Small left-sided pleural effusion.  Small to moderate pericardial effusion, which is increased in size from   CT scan of the abdomen and pelvis performed on 1/30/2022.  Other findings as discussed above.  USHA GARCIA MD; Attending Radiologist  This document has been electronically signed. Jan 31 2022 11:26AM  Addend:USHA GARCIA MD; Attending Radiologist  This addendum was electronically signed on: Jan 31 2022 11:30AM.    A:  51-yo female with h/o CAD, s/p recent PCI/angioplasty 1/14/22;   P/W bruising/swelling at access site  Sono of RLE demonstrated GSV/CFV DVT  Today with persistent chest pain - CT earlier today demonstrates bilat PE    P:  Patient with increasing leukocytosis, likely reactive  H&H remains stable   Vitals stable and reassuring, requested repeat set now  Patient already on AC/hep gtt for DVT per medicine - to continue for now  Will discuss with Dr. Kapoor concerning the possiblity of surgical intervention (though unlikely)  Continue care per primary team  Will follow

## 2022-01-31 NOTE — PROGRESS NOTE ADULT - ASSESSMENT
50 yo female with PMHx of hypothyroidism, CAD/NSTEMI s/p POBA to OM1 followed by PCI c/w SCAD, former smoker admitted for R acute DVT at greater saphenous vein/common femoral vein junction. 50 yo female with PMHx of hypothyroidism, CAD/NSTEMI s/p POBA to OM1 followed by PCI c/w SCAD, former smoker admitted for R acute DVT at greater saphenous vein/common femoral vein junction and now b/l PE

## 2022-01-31 NOTE — PROGRESS NOTE ADULT - ASSESSMENT
51 F with CAD s/p recent stent to OM1 c/w SCAD, and hypothyroidism p/w right upper thigh ecchymosis that she noticed 2 days ago.  Denies dizziness or lightheadedness.  Denies numbness or tingling of RLE. Patient had recently had a cardiac cath on 1/13 and 14 using an angioseal with no complication on exit site of catheter.      CAD s/p recent stent to OM1 with ?hematoma  - S/P 1/13 cardiac cath with angioseal and new ecchymosis to thigh  - LE Doppler Acute Right DVT, heparin gtt initiated  - CTAP: small hemorrhage in the extraperitoneal space of the right pelvis along the right pelvic sidewall without discrete collection, surgery following ok with heparin gtt, no bolus, monitor H/H closely  - H/H stable this AM:  9.1/28.9 if remains stable bridge to DOAC    Chest Pain  - This AM patient reporting chest pain, worse with inspiration and when laying flat, possible pericarditis?  - Troponin I: <--960.6, send another set  - EKG done this AM SR 94 low voltage, no acute ischemia  - D-Dimer sent   - Although BNP elevated, no significant volume overload on exam   - CTAP noted small pericardial effusion  - Would get TTE  -Continue ASA, BB, statin  -BP stable 138/80 continue Lisinopril    - Monitor and replete lytes, keep K>4, Mg>2.  - Will continue to follow.    Rose Marie Abel, MS ANP, AGACNP  Nurse Practitioner- Cardiology   Spectra #[phone]

## 2022-01-31 NOTE — DISCHARGE NOTE PROVIDER - NSDCCPCAREPLAN_GEN_ALL_CORE_FT
PRINCIPAL DISCHARGE DIAGNOSIS  Diagnosis: DVT of lower limb, acute  Assessment and Plan of Treatment: You were found to have a blood clot in your leg. You were started on a blood thinner.   - Recommend following up with your primary care doctor and vascular doctor.       PRINCIPAL DISCHARGE DIAGNOSIS  Diagnosis: Acute deep vein thrombosis (DVT)  Assessment and Plan of Treatment: You were found to have a blood clot in your leg. You were started on a blood thinner.   - Recommend following up with your primary care doctor, Dr. Haley Lloyd      SECONDARY DISCHARGE DIAGNOSES  Diagnosis: CAD (coronary artery disease)  Assessment and Plan of Treatment: You should resume your home medications     PRINCIPAL DISCHARGE DIAGNOSIS  Diagnosis: Acute deep vein thrombosis (DVT)  Assessment and Plan of Treatment: You were found to have a blood clot in your leg. You were started on a blood thinner.   - Recommend following up with your cardiologist, Dr. Garcia and primary care doctor, Dr. Haley Lloyd  - Recommend following up with a vascular doctor, Dr. Kapoor within 2 weeks of discharge      SECONDARY DISCHARGE DIAGNOSES  Diagnosis: Pulmonary embolism  Assessment and Plan of Treatment: You were found to have blood clots in both lungs on Cat scan. You were started on a blood thinner.  - Recommend following up with your cardiologist, Dr. Garcia and primary care doctor, Dr. Haley Lloyd  - Recommend following up with a vascular doctor, Dr. Kapoor within 2 weeks of discharge    Diagnosis: CAD (coronary artery disease)  Assessment and Plan of Treatment: You were seen by the cardiologist, who recommended stopping your home dose aspirin and starting you on an antiplatelet medication called Plavix  - Recommend you STOP taking Aspirin   - Recommend you START plavix 75mg daily   - Recommend following up with your cardiologist, Dr. Garcia and primary care doctor, Dr. Haley Lloyd     PRINCIPAL DISCHARGE DIAGNOSIS  Diagnosis: Acute deep vein thrombosis (DVT)  Assessment and Plan of Treatment: You were found to have a blood clot in your leg. You were started on a blood thinner.   - Recommend you START taking Eliquis Starter Pack as directed, medication was delivered to your bedside prior to discharge  - Recommend following up with your cardiologist, Dr. Garcia and primary care doctor, Dr. Haley Lloyd  - Recommend following up with a vascular doctor, Dr. Kapoor within 2 weeks of discharge      SECONDARY DISCHARGE DIAGNOSES  Diagnosis: Pulmonary embolism  Assessment and Plan of Treatment: You were found to have blood clots in both lungs on Cat scan. You were started on a blood thinner.  - Recommend following up with your cardiologist, Dr. Garcia and primary care doctor, Dr. Haley Lloyd  - Recommend following up with a vascular doctor, Dr. Kapoor within 2 weeks of discharge    Diagnosis: CAD (coronary artery disease)  Assessment and Plan of Treatment: You were seen by the cardiologist, who recommended stopping your home dose aspirin and starting you on an antiplatelet medication called Plavix since you had a blockage in one of the arteries in your heart prior to this hospitalization.  - Recommend you STOP taking Aspirin   - Recommend you START plavix 75mg daily   - Recommend following up with your cardiologist, Dr. Garcia and primary care doctor, Dr. Haley Lloyd     PRINCIPAL DISCHARGE DIAGNOSIS  Diagnosis: Acute deep vein thrombosis (DVT)  Assessment and Plan of Treatment: You were found to have a blood clot in your leg. You were started on a blood thinner.   - Recommend you START taking Eliquis Starter Pack as directed, medication was delivered to your bedside prior to discharge  - Recommend following up with your cardiologist, Dr. Garcia and primary care doctor, Dr. Haley Lloyd  - Recommend following up with a vascular doctor, Dr. Kapoor within 2 weeks of discharge      SECONDARY DISCHARGE DIAGNOSES  Diagnosis: Pulmonary embolism  Assessment and Plan of Treatment: You were found to have blood clots in both lungs on Cat scan. You were started on a blood thinner.  - Recommend following up with your cardiologist, Dr. Garica and primary care doctor, Dr. Haley Lloyd  - Recommend following up with a vascular doctor, Dr. Kapoor within 2 weeks of discharge    Diagnosis: CAD (coronary artery disease)  Assessment and Plan of Treatment: You were seen by the cardiologist, who recommended stopping your home dose aspirin and starting you on an antiplatelet medication called Plavix since you had a blockage in one of the arteries in your heart prior to this hospitalization.    - Recommend you STOP taking Aspirin   - Recommend you START plavix 75mg daily   - Recommend following up with your cardiologist, Dr. Garcia and primary care doctor, Dr. Haley Lloyd    Diagnosis: Pericardial effusion  Assessment and Plan of Treatment: You were found to have some fluid around your heart on admission on echocardiogram. You had an Echocardiogram on 2/2, which still showed a small pericardial effusion.You were evaluated by the cardiologist.   - Recommend following up with your cardiologist, Dr. Garcia and primary care doctor, Dr. Haley Lloyd     PRINCIPAL DISCHARGE DIAGNOSIS  Diagnosis: Acute deep vein thrombosis (DVT)  Assessment and Plan of Treatment: You were found to have a blood clot in your leg. You were started on a blood thinner.   - Recommend you START taking Eliquis Starter Pack as directed, medication was delivered to your bedside prior to discharge  - Recommend following up with your cardiologist, Dr. Garcia and primary care doctor, Dr. Haley Lloyd  - Recommend following up with a vascular doctor, Dr. Kapoor within 2 weeks of discharge  - Please return to the emergency department if you experience fever, chills, chest pain, shortness of breath, or any symptom you feel requires evaluation by a physician.      SECONDARY DISCHARGE DIAGNOSES  Diagnosis: CAD (coronary artery disease)  Assessment and Plan of Treatment: You were seen by the cardiologist, who recommended stopping your home dose aspirin and starting you on an antiplatelet medication called Plavix since you had a blockage in one of the arteries in your heart prior to this hospitalization.    - Recommend you STOP taking Aspirin   - Recommend you START plavix 75mg daily   - Recommend following up with your cardiologist, Dr. Garcia and primary care doctor, Dr. Haley Lloyd    Diagnosis: Pulmonary embolism  Assessment and Plan of Treatment: You were found to have blood clots in both lungs on Cat scan. You were started on a blood thinner.  - Recommend following up with your cardiologist, Dr. Garcia and primary care doctor, Dr. Haley Lloyd  - Recommend following up with a vascular doctor, Dr. Kapoor within 2 weeks of discharge    Diagnosis: Pericardial effusion  Assessment and Plan of Treatment: You were found to have some fluid around your heart on admission on echocardiogram. You had an Echocardiogram on 2/2, which still showed a small pericardial effusion.You were evaluated by the cardiologist.   - Recommend following up with your cardiologist, Dr. Garcia and primary care doctor, Dr. Haley Lloyd     PRINCIPAL DISCHARGE DIAGNOSIS  Diagnosis: Acute deep vein thrombosis (DVT)  Assessment and Plan of Treatment: You were found to have a blood clot in your leg. You were started on a blood thinner.   - Recommend you START taking Eliquis Starter Pack as directed, medication was delivered to your bedside prior to discharge  - Recommend following up with your cardiologist, Dr. Garcia and primary care doctor, Dr. Haley Lloyd  - Recommend following up with a vascular doctor, Dr. Kapoor within 2 weeks of discharge  - Please return to the emergency department if you experience fever, chills, chest pain, shortness of breath, or any symptom you feel requires evaluation by a physician.      SECONDARY DISCHARGE DIAGNOSES  Diagnosis: CAD (coronary artery disease)  Assessment and Plan of Treatment: You were seen by the cardiologist, who recommended stopping your home dose aspirin and starting you on an antiplatelet medication called Plavix since you had a blockage in one of the arteries in your heart prior to this hospitalization.    - Recommend you STOP taking Aspirin   - Recommend you START plavix 75mg daily. This medication was sent to your pharmacy.  - Recommend you START Lipitor (atorvastatin) 80mg daily. This medication was sent to your pharmacy.  - Recommend following up with your cardiologist, Dr. Garcia and primary care doctor, Dr. Haley Lloyd    Diagnosis: Pulmonary embolism  Assessment and Plan of Treatment: You were found to have blood clots in both lungs on Cat scan. You were started on a blood thinner.  - Recommend following up with your cardiologist, Dr. Garcia and primary care doctor, Dr. Haley Lloyd  - Recommend following up with a vascular doctor, Dr. Kapoor within 2 weeks of discharge    Diagnosis: Pericardial effusion  Assessment and Plan of Treatment: You were found to have some fluid around your heart on admission on echocardiogram. You had an Echocardiogram on 2/2, which still showed a small pericardial effusion.You were evaluated by the cardiologist.   - Recommend following up with your cardiologist, Dr. Garcia and primary care doctor, Dr. Haley Lloyd     PRINCIPAL DISCHARGE DIAGNOSIS  Diagnosis: Acute deep vein thrombosis (DVT)  Assessment and Plan of Treatment: You were found to have a blood clot in your leg. You were started on a blood thinner.   - Recommend you START taking Eliquis Starter Pack as directed, medication was delivered to your bedside prior to discharge  - Recommend following up with your cardiologist, Dr. Garcia and primary care doctor, Dr. Haley Lloyd  - Recommend following up with a vascular doctor, Dr. Kapoor within 2 weeks of discharge  - Please return to the emergency department if you experience fever, chills, chest pain, shortness of breath, or any symptom you feel requires evaluation by a physician.      SECONDARY DISCHARGE DIAGNOSES  Diagnosis: Pulmonary embolism  Assessment and Plan of Treatment: You were found to have blood clots in both lungs on Cat scan. You were started on a blood thinner.  - Recommend following up with your cardiologist, Dr. Garcia and primary care doctor, Dr. Haley Lloyd  - Recommend following up with a vascular doctor, Dr. Kapoor within 2 weeks of discharge    Diagnosis: CAD (coronary artery disease)  Assessment and Plan of Treatment: You were seen by the cardiologist, who recommended stopping your home dose aspirin and starting you on an antiplatelet medication called Plavix since you had a blockage in one of the arteries in your heart prior to this hospitalization.    - Recommend you STOP taking Aspirin   - Recommend you START plavix 75mg daily. This medication was sent to your pharmacy.  - Recommend you START Lipitor (atorvastatin) 80mg daily. This medication was sent to your pharmacy.  - Recommend following up with your cardiologist, Dr. Garcia and primary care doctor, Dr. Haley Lloyd    Diagnosis: Pericardial effusion  Assessment and Plan of Treatment: You were found to have some fluid around your heart on admission on echocardiogram. You had an Echocardiogram on 2/2, which still showed a small pericardial effusion.You were evaluated by the cardiologist.   - Recommend following up with your cardiologist, Dr. Garcia and primary care doctor, Dr. Haley Lloyd

## 2022-01-31 NOTE — PATIENT PROFILE ADULT - FALL HARM RISK - HARM RISK INTERVENTIONS

## 2022-01-31 NOTE — PROGRESS NOTE ADULT - SUBJECTIVE AND OBJECTIVE BOX
[facility] -- Liz Dockery, Danielle, Albert, Kit Garcia, Idalia David: Office # [phone]    Follow Up:  CAD S/P 1/13 Stent OM1, DVT     Subjective/Observations: Patient seen and examined. Patient awake, alert, complaining of chest pain worse with laying flat and with inspiration. She also is c/o being very tired as she did not sleep all night. Tolerating room air. Heparin iv gtt infusing.     REVIEW OF SYSTEMS: All review of systems is negative for eye, ENT, GI, , allergic, dermatologic, musculoskeletal and neurologic except as described above    PAST MEDICAL & SURGICAL HISTORY:  Hypothyroidism    CAD (coronary artery disease)    NSTEMI (non-ST elevation myocardial infarction)    S/P cardiac catheterization  &amp; balloon angioplasty with thrombotic lesion in OM1        MEDICATIONS  (STANDING):  aspirin  chewable 81 milliGRAM(s) Oral daily  atorvastatin 10 milliGRAM(s) Oral at bedtime  heparin  Infusion.  Unit(s)/Hr (20 mL/Hr) IV Continuous <Continuous>  levothyroxine 150 MICROGram(s) Oral daily  lidocaine   4% Patch 1 Patch Transdermal daily  lisinopril 2.5 milliGRAM(s) Oral daily  metoprolol succinate ER 25 milliGRAM(s) Oral daily    MEDICATIONS  (PRN):  acetaminophen     Tablet .. 650 milliGRAM(s) Oral every 8 hours PRN Mild Pain (1 - 3)  heparin   Injectable 9000 Unit(s) IV Push every 6 hours PRN For aPTT less than 40  heparin   Injectable 4500 Unit(s) IV Push every 6 hours PRN For aPTT between 40 - 57    Allergies    No Known Allergies    Intolerances      Vital Signs Last 24 Hrs  T(C): 37.4 (31 Jan 2022 05:51), Max: 39.2 (30 Jan 2022 10:39)  T(F): 99.3 (31 Jan 2022 05:51), Max: 102.6 (30 Jan 2022 10:39)  HR: 97 (31 Jan 2022 05:51) (89 - 102)  BP: 106/70 (31 Jan 2022 05:51) (106/70 - 138/80)  BP(mean): --  RR: 18 (31 Jan 2022 05:51) (16 - 18)  SpO2: 97% (31 Jan 2022 05:51) (97% - 100%)  I&O's Summary    Weight (kg): 113.4 (01-30 @ 10:39)    TELE: SR 88  PHYSICAL EXAM:  Appearance: NAD, no distress, alert, morbidly obese  HEENT: Moist Mucous Membranes, Anicteric  Cardiovascular: Regular rate and rhythm, Normal S1 S2, No JVD, No murmurs, No rubs, gallops or clicks  Respiratory: Non-labored, Clear to auscultation, No rales, No rhonchi, No wheezing.   Gastrointestinal:  Soft, Non-tender, + BS  Neurologic: Non-focal  Skin: Warm and dry, No visible rashes or ulcers, + right thigh  ecchymosis, No cyanosis  Musculoskeletal: No clubbing, No cyanosis, No joint swelling/tenderness  Psychiatry: Mood & affect appropriate  Lymph: No peripheral edema.     LABS: All Labs Reviewed:                        9.1    13.56 )-----------( 518      ( 31 Jan 2022 06:40 )             28.9                         9.3    10.00 )-----------( 543      ( 30 Jan 2022 11:45 )             29.3     31 Jan 2022 06:40    134    |  103    |  6      ----------------------------<  111    3.6     |  26     |  0.84   30 Jan 2022 11:50    138    |  107    |  6      ----------------------------<  95     3.8     |  27     |  0.90     Ca    8.4        31 Jan 2022 06:40  Ca    8.5        30 Jan 2022 11:50  Phos  3.3       31 Jan 2022 06:40  Mg     2.1       31 Jan 2022 06:40    TPro  8.2    /  Alb  2.8    /  TBili  1.2    /  DBili  x      /  AST  14     /  ALT  38     /  AlkPhos  81     31 Jan 2022 06:40    PT/INR - ( 30 Jan 2022 11:45 )   PT: 16.0 sec;   INR: 1.39 ratio         PTT - ( 30 Jan 2022 11:45 )  PTT:30.4 sec  Creatine Kinase, Serum: 68 U/L (01-31-22 @ 06:40)  Troponin I, High Sensitivity Result: 960.6 ng/L (01-31-22 @ 06:40)  Cholesterol, Serum: 159 mg/dL (01-14-22 @ 09:20)  HDL Cholesterol, Serum: 58 mg/dL (01-14-22 @ 09:20)  Triglycerides, Serum: 61 mg/dL (01-14-22 @ 09:20)      < from: US Duplex Venous Lower Ext Complete, Bilateral (01.30.22 @ 17:42) >  ACC: [id number] EXAM:  US DPLX LWR EXT VEINS COMPL BI                        PROCEDURE DATE:  01/30/2022    INTERPRETATION:  CLINICAL INFORMATION: Status post cardiac catheter with   concern for greater saphenous vein clot at recent CT.  COMPARISON: CT 1/30/2022  TECHNIQUE: Duplex sonography of the BILATERAL LOWER extremity veins with   color and spectral Doppler, with and without compression.  FINDINGS:    RIGHT:  Acute thrombus is present within the greater saphenous vein extending to   the common femoral vein junction. Normal compressibility of the femoral   and popliteal veins.  Doppler examination shows normal spontaneous and phasic flow.  No RIGHT calf vein thrombosis is detected.    LEFT:  Normal compressibility of the LEFT common femoral, femoral and popliteal   veins.  Doppler examination shows normal spontaneous and phasic flow.  No LEFT calf vein thrombosis is detected.    IMPRESSION:  Acute deep venous thrombosis at the right greater saphenous vein/common   femoral vein junction.  Acute deep venous thrombosis: above the knee.    Findings were discussed with Dr. NEVA SHIPLEY  1/30/2022 5:55 PM by Dr. Price with read back confirmation.  --- End of Report ---  < end of copied text >    < from: CT Abdomen and Pelvis w/ IV Cont (01.30.22 @ 15:45) >  ACC: [id number] EXAM:  CT ABDOMEN AND PELVIS IC                        PROCEDURE DATE:  01/30/2022    INTERPRETATION:  CLINICAL INFORMATION: Status post cardiac catheter with   right groin hematoma. Evaluate for retroperitoneal bleed.  COMPARISON: None.  CONTRAST/COMPLICATIONS:  IV Contrast: Omnipaque 350  90 cc administered   10 cc discarded  Oral Contrast: NONE  Complications: None reported at time of study completion    PROCEDURE:  CT of the Abdomen and Pelvis was performed.  Sagittal and coronal reformats were performed.    FINDINGS:  LOWER CHEST: Small pericardial effusion with questionable enhancing   pericardium.  LIVER: A 1.6 cm indeterminate hypodense lesion in the left hepatic lobe.  BILE DUCTS: Normal caliber.  GALLBLADDER: Within normal limits.  SPLEEN: Within normal limits.  PANCREAS: Within normal limits.  ADRENALS: Within normal limits.  KIDNEYS/URETERS: Left renal cortical scarring.  BLADDER: Within normal limits.  REPRODUCTIVE ORGANS: Enlarged fibroid uterus. No adnexal mass.  BOWEL: No bowel obstruction.  PERITONEUM: No ascites.  VESSELS: Right greater saphenous vein is expanded and hypodense.   Heterogeneity of the right external iliac vein.  RETROPERITONEUM/LYMPH NODES: No lymphadenopathy. Small hemorrhage in the   extraperitoneal space of the right pelvis along the right pelvic side   wall. No discrete collection.  ABDOMINAL WALL: No right groin collection.  BONES: Degenerative changes.    IMPRESSION:  Small hemorrhage in the extraperitoneal space of the right pelvis along   the right pelvic sidewall without discrete collection.    Expanded right greater saphenous vein and heterogeneity of the right   external iliac vein raising concern for thrombosis. Correlation with   right lower extremity venous Doppler is recommended with specific   attention to these vessels.    Small pericardial effusion with concern for mild pericardial enhancement   which may be infectious or inflammatory.  A 1.6 cm indeterminate left hepatic lobe lesion.  --- End of Report ---  < end of copied text >   [facility] -- Liz Dockery, Danielle, Albert, Kit Garcia, Idalia David: Office # [phone]    Follow Up:  CAD S/P  Stent OM1, DVT     Subjective/Observations: Patient seen and examined. Patient awake, alert, complaining of chest pain worse with laying flat and with inspiration. She also is c/o being very tired as she did not sleep all night. Tolerating room air. Heparin iv gtt infusing.     REVIEW OF SYSTEMS: All review of systems is negative for eye, ENT, GI, , allergic, dermatologic, musculoskeletal and neurologic except as described above    PAST MEDICAL & SURGICAL HISTORY:  Hypothyroidism    CAD (coronary artery disease)    NSTEMI (non-ST elevation myocardial infarction)    S/P cardiac catheterization  &amp; balloon angioplasty with thrombotic lesion in OM1        MEDICATIONS  (STANDING):  aspirin  chewable 81 milliGRAM(s) Oral daily  atorvastatin 10 milliGRAM(s) Oral at bedtime  heparin  Infusion.  Unit(s)/Hr (20 mL/Hr) IV Continuous <Continuous>  levothyroxine 150 MICROGram(s) Oral daily  lidocaine   4% Patch 1 Patch Transdermal daily  lisinopril 2.5 milliGRAM(s) Oral daily  metoprolol succinate ER 25 milliGRAM(s) Oral daily    MEDICATIONS  (PRN):  acetaminophen     Tablet .. 650 milliGRAM(s) Oral every 8 hours PRN Mild Pain (1 - 3)  heparin   Injectable 9000 Unit(s) IV Push every 6 hours PRN For aPTT less than 40  heparin   Injectable 4500 Unit(s) IV Push every 6 hours PRN For aPTT between 40 - 57    Allergies    No Known Allergies    Intolerances      Vital Signs Last 24 Hrs  T(C): 37.4 (2022 05:51), Max: 39.2 (2022 10:39)  T(F): 99.3 (2022 05:51), Max: 102.6 (2022 10:39)  HR: 97 (2022 05:51) (89 - 102)  BP: 106/70 (2022 05:51) (106/70 - 138/80)  BP(mean): --  RR: 18 (2022 05:51) (16 - 18)  SpO2: 97% (2022 05:51) (97% - 100%)  I&O's Summary    Weight (kg): 113.4 ( @ 10:39)    TELE: SR 88  PHYSICAL EXAM:  Appearance: NAD, no distress, alert, morbidly obese  HEENT: Moist Mucous Membranes, Anicteric  Cardiovascular: Regular rate and rhythm, Normal S1 S2, No JVD, No murmurs, No rubs, gallops or clicks  Respiratory: Non-labored, Clear to auscultation, No rales, No rhonchi, No wheezing.   Gastrointestinal:  Soft, Non-tender, + BS  Neurologic: Non-focal  Skin: Warm and dry, No visible rashes or ulcers, + right thigh  ecchymosis, No cyanosis  Musculoskeletal: No clubbing, No cyanosis, No joint swelling/tenderness  Psychiatry: Mood & affect appropriate  Lymph: No peripheral edema.     LABS: All Labs Reviewed:                        9.1    13.56 )-----------( 518      ( 2022 06:40 )             28.9                         9.3    10.00 )-----------( 543      ( 2022 11:45 )             29.3     2022 06:40    134    |  103    |  6      ----------------------------<  111    3.6     |  26     |  0.84   2022 11:50    138    |  107    |  6      ----------------------------<  95     3.8     |  27     |  0.90     Ca    8.4        2022 06:40  Ca    8.5        2022 11:50  Phos  3.3       2022 06:40  Mg     2.1       2022 06:40    TPro  8.2    /  Alb  2.8    /  TBili  1.2    /  DBili  x      /  AST  14     /  ALT  38     /  AlkPhos  81     2022 06:40    PT/INR - ( 2022 11:45 )   PT: 16.0 sec;   INR: 1.39 ratio         PTT - ( 2022 11:45 )  PTT:30.4 sec  Creatine Kinase, Serum: 68 U/L (22 @ 06:40)  Troponin I, High Sensitivity Result: 960.6 ng/L (22 @ 06:40)  Cholesterol, Serum: 159 mg/dL (22 @ 09:20)  HDL Cholesterol, Serum: 58 mg/dL (22 @ 09:20)  Triglycerides, Serum: 61 mg/dL (22 @ 09:20)      < from: US Duplex Venous Lower Ext Complete, Bilateral (22 @ 17:42) >  ACC: [id number] EXAM:  US DPLX LWR EXT VEINS COMPL BI                        PROCEDURE DATE:  2022    INTERPRETATION:  CLINICAL INFORMATION: Status post cardiac catheter with   concern for greater saphenous vein clot at recent CT.  COMPARISON: CT 2022  TECHNIQUE: Duplex sonography of the BILATERAL LOWER extremity veins with   color and spectral Doppler, with and without compression.  FINDINGS:    RIGHT:  Acute thrombus is present within the greater saphenous vein extending to   the common femoral vein junction. Normal compressibility of the femoral   and popliteal veins.  Doppler examination shows normal spontaneous and phasic flow.  No RIGHT calf vein thrombosis is detected.    LEFT:  Normal compressibility of the LEFT common femoral, femoral and popliteal   veins.  Doppler examination shows normal spontaneous and phasic flow.  No LEFT calf vein thrombosis is detected.    IMPRESSION:  Acute deep venous thrombosis at the right greater saphenous vein/common   femoral vein junction.  Acute deep venous thrombosis: above the knee.    Findings were discussed with Dr. NEVA SHIPLEY  2022 5:55 PM by Dr. Price with read back confirmation.  --- End of Report ---  < end of copied text >    < from: CT Abdomen and Pelvis w/ IV Cont (22 @ 15:45) >  ACC: [id number] EXAM:  CT ABDOMEN AND PELVIS IC                        PROCEDURE DATE:  2022    INTERPRETATION:  CLINICAL INFORMATION: Status post cardiac catheter with   right groin hematoma. Evaluate for retroperitoneal bleed.  COMPARISON: None.  CONTRAST/COMPLICATIONS:  IV Contrast: Omnipaque 350  90 cc administered   10 cc discarded  Oral Contrast: NONE  Complications: None reported at time of study completion    PROCEDURE:  CT of the Abdomen and Pelvis was performed.  Sagittal and coronal reformats were performed.    FINDINGS:  LOWER CHEST: Small pericardial effusion with questionable enhancing   pericardium.  LIVER: A 1.6 cm indeterminate hypodense lesion in the left hepatic lobe.  BILE DUCTS: Normal caliber.  GALLBLADDER: Within normal limits.  SPLEEN: Within normal limits.  PANCREAS: Within normal limits.  ADRENALS: Within normal limits.  KIDNEYS/URETERS: Left renal cortical scarring.  BLADDER: Within normal limits.  REPRODUCTIVE ORGANS: Enlarged fibroid uterus. No adnexal mass.  BOWEL: No bowel obstruction.  PERITONEUM: No ascites.  VESSELS: Right greater saphenous vein is expanded and hypodense.   Heterogeneity of the right external iliac vein.  RETROPERITONEUM/LYMPH NODES: No lymphadenopathy. Small hemorrhage in the   extraperitoneal space of the right pelvis along the right pelvic side   wall. No discrete collection.  ABDOMINAL WALL: No right groin collection.  BONES: Degenerative changes.    IMPRESSION:  Small hemorrhage in the extraperitoneal space of the right pelvis along   the right pelvic sidewall without discrete collection.    Expanded right greater saphenous vein and heterogeneity of the right   external iliac vein raising concern for thrombosis. Correlation with   right lower extremity venous Doppler is recommended with specific   attention to these vessels.    Small pericardial effusion with concern for mild pericardial enhancement   which may be infectious or inflammatory.  A 1.6 cm indeterminate left hepatic lobe lesion.  --- End of Report ---  < end of copied text >    < from: Cardiac Catheterization (22 @ 13:24) >    Study Date:     2022   Name:           YU RED   :            1970   (51 years)   Gender:         female   MR#:            [id number]   [facility]#:           [id number]   Patient Class:  Inpatient     Cath Lab Report    Diagnostic Cardiologist:       Srinivas Lamb MD   Fellow:                        Lamin Crain MD   Fellow:                        Renato Flor MD   Monitor:                       FREDERIC Carpenter   Scrub:                         FREDERIC Garcia   Nurse:                        René Rodríguez   Referring Physician:           Neva Santos MD     Procedures Performed   Procedures:               1.    Left Coronary Angio     2.    Sonosite - Diagnostic   3.    Art Access - L femoral artery*   4.    Hemostasis with Angioseal     Indications:               Myocardial infarction without ST elevation  (NSTEMI)    Diagnostic Conclusions:     It appears the distal OM lesions are most likely secondary to SCAD  given the normal angiographic appearance of the rest of the  vessel     Procedure Narrative:   The risks and alternatives of the procedures and conscious sedation  were explained to the patient and informed consent was  obtained. The patient was brought to the cath lab and placed on the  exam table.  Access   Left femoral artery:   The puncture site was infiltrated with 1% Lidocaine. Vascular access  was obtained using modified seldinger technique.    Diagnostic Findings:     Coronary Angiography   LM   Left main artery: Angiography shows mild atherosclerosis.      LAD   Left anterior descending artery: Angiography shows mild  atherosclerosis.    CX   First obtuse marginal: There is a 100 % stenosis. Second obtuse  marginal: There is a 100 % stenosis.    RCA   Right coronary artery: Angiography shows mild atherosclerosis.      Patient: YU RED          MRN: [id number]  Study Date: 2022   01:24 PM      Page 1 of 3    History and Risk Factors:   Hypertension:                             Yes   Dyslipidemia:                          Yes   Prior MI:                                 No   Prior PCI:                                Yes   Family History of Premature CAD:          No   Cerebrovascular Disease:                  No   Peripheral Arterial Disease:            No   Cardiac Arrest Out of Hospital:           No   Prior Heart Failure:                      No   Diabetes:                                 No   Exam Start Time:   01:24 PM   Exam End Time:     01:36 PM   Exam Duration:     12 min     Contrast:   Description                    Dose         Unit       Serial No.   Omnipaque                         27.000   mL     Medication:   Description                 Dose, Unit, Route, Time   midazolam  1 mG/mL          1.0, mg, IV, 13:24:44   Injectable (versed)   fentaNYL 0.05 mG/mL         25.0, mcg, IV, 13:24:49   Injectable  (Fentanyl)     Inventory:   Description                  Quantity     Peoplesoft#        Reference  No.    Serial No.      Lot No.       CDM  CATH PACK 1.000        [id number]   [id number]  [id number]    030   [employer] [facility] 2 /    1.000        [id number]   [id number]   4                                      604   HEPARIN SALINE 1000 USP   1.000        [id number]   -94  [id number]  Units/500 mL                           397   HEPARIN SALINE 1000 USP   1.000        [id number]   -62  [id number]  Units/500 mL                           397   OMNIPAQUE 350 150ML       1.000        [id number]   [id number]     368   .035 X 150 GUIDERIGHT     1.000        [id number]   [id number]  [id number]    594   6FR SHEATH PINNACLE 1.000              [id number]   [id number]  [id number]    644   6FR EBU 3.0 LAUNCHER      1.000        [id number]   [id number]  [id number]    971   6FR VIP ANGIOSEAL         1.000        [id number]   [id number]  [id number]    975   Hemodynamic Pressures:   Phase          Location           [mmHg]               [mmHg]  [mmHg]           HR    Patient: YU RED          MRN: [id number]  Study Date: 2022   01:24 PM      Page 2 of 3    Baseline       Ao                     s       111                 d  71                 m       87          94    Conscious sedation was administered and monitored by Cardiology  nursing staff,  under my direct supervision when applicable.     Electronically signed by Srinivas Lamb MD on 2022 at 03:03 PM     Patient: YU RED          MRN: [id number]  Study Date: 2022   01:24 PM      Page 3 of 3    < end of copied text >

## 2022-01-31 NOTE — CONSULT NOTE ADULT - ASSESSMENT
51F obese, former smoker, recent spontaneous coronary artery dissection s/p POBA to OM1 @ [facility] (1/14) who presents to the ED with R upper leg pain and found to have an acute R saphenous/CFV DVT, B/L PE without RV strain and possible post-MI pericarditis.    Acute R saphenous/CFV DVT  B/L Pulmonary embolus -  RLL LLL segmental PA branch, L lingula  Post MI Pericarditis    - Patient hemodynamically stable, able to protect her airway and saturating % on RA  - Repeat CBC stable from [facility] to today  - CTA without evidence of RH strain; awaiting official TTE  - Dr. Garcia - Cards on board; will await TTE and trend CBC  - Highly unlikely coronary artery perforation as procedure was on 1/14 and post procedure KARI without pericardial effusion  - While chest pain persistent, it is able to be relieved from positional changes; likely post-MI pericarditis  - Trops have peaked to 960 and is now 848, BNP 1329  - Would trend CBC q6-8 hours; daily TTE at least x 2 to assess pericardial effusion size and its hemodynamic effects  - Surgery consulted for acute DVT; no acute intervention  - Patient to remain on heparin and plavix as patient was POBA and not a JALEN  - Pain control PRN  - Patient not an ICU candidate at this time; Case discussed and plan formulated with ICU attending Dr. Kong and Dr. Garcia 
50 yo female with PMHx of CAD s/p recent PCI w/angioplasty, hypothyroidism, former smoker presents to the ED with acute deep venous thrombosis at the right greater saphenous vein/common femoral vein junction and small hemorrhage in the extraperitoneal space of the right pelvis along the right pelvic sidewall without discrete collection.
50 yo female with PMHx of hypothyroidism, CAD/NSTEMI s/p POBA to OM1 followed by PCI c/w SCAD, former smoker admitted for R acute DVT at greater saphenous vein/common femoral vein junction.    DVT - PE - possible evolving Pulm Infarct - AC (on Heparin gtt) - I carlos alberto - monitor VS and Sat - RV strain eval - ProBNP and CE noted -   pericardial effusion - pericarditis eval - TTE - CT imaging - Cardio f/u - Hemodynamic monitoring -   Covid 19 - positive on JAN 13 - repeat Neg on Jan   CAD HF - cvs rx regimen - cardio f/u  Obesity - weight management - ANUJ eval as outpatient  Ex Smoker - PFT testing as outpatient - Pulm Eval as outpatient for COPD  CTAP: small hemorrhage in the extraperitoneal space of the right pelvis along the right pelvic sidewall without discrete collection - on Hep gtt - serial H and H, Surgery following -   ICU eval noted  monitor VS and HD and Sat    
Assessment/Plan:  51 F with CAD s/p recent stent to OM1 c/w SCAD, and hypothyroidism p/w right upper thigh ecchymosis that she noticed 2 days ago.  Denies dizziness or lightheadedness.  Denies numbness or tingling of RLE.  Denies chest pain or palpitations, SOB or SARAVIA.  Of note, patient had recently had a cardiac cath on 1/13 and 14 using an angioseal with no complication on exit site of catheter.      CAD s/p recent stent to OM1 with ?hematoma  - Angioseal used as a closing device.  No clear evidence of hematoma on exam.  Patient is totally asymptomatic   - Doubt hematoma or active bleeding  - Follow up CBC  - Follow up CT to r/o RPB  - If no acute drop in H/H and above CT negative, can be discharged from cardiac standpoint.  To follow up with Dr. Garcia as outpatient.  She is supposed to f/u 2 weeks from D/C in [place]    If admitted, will continue to follow    Janna White DNP, NP-C  Cardiology  Spectra #[phone]

## 2022-01-31 NOTE — ED ADULT NURSE REASSESSMENT NOTE - NS ED NURSE REASSESS COMMENT FT1
Rechecked vital signs and noted temp= 100.7F orally. dr. Melvin made aware and ordered to give additional tylenol 325mg po once.

## 2022-01-31 NOTE — DISCHARGE NOTE PROVIDER - CARE PROVIDER_API CALL
Haley Nagel  INTERNAL MEDICINE  [address] [place] [id number]  Phone: [phone]  Fax: [phone]  Established Patient  Follow Up Time:     Cesar Garcia)  Cardio [facility]  [address] [place] [address]  Phone: [phone]  Fax: [phone]  Follow Up Time:    Haley Nagel  INTERNAL MEDICINE  [address] [place] [address]  Phone: [phone]  Fax: [phone]  Established Patient  Follow Up Time:     Cesar Garcia)  Cardio [facility]  [address] [place] [address]  Phone: [phone]  Fax: [phone]  Follow Up Time:     Katt Blanton)  Vascular Surgery  [address] [place] [address]  Phone: [phone]  Fax: [phone]  Follow Up Time:

## 2022-01-31 NOTE — CONSULT NOTE ADULT - SUBJECTIVE AND OBJECTIVE BOX
Date/Time Patient Seen:  		  Referring MD:   Data Reviewed	       Patient is a 51y old  Female who presents with a chief complaint of R above the knee DVT (31 Jan 2022 12:11)      Subjective/HPI  vs noted  labs reviewed  in chair  on RA  verbal  alert  oriented  H and P reviewed  ER provider note reviewed  ICU eval noted  Cardio eval noted  TTE reviewed  CT chest reviewed      Hospital Course:  Admission Date	30-Jan-2022 18:10  Reason for Admission	R above the knee DVT  Hospital Course	  FROM ADMISSION H+P:   HPI:  52 yo female with PMHx of CAD s/p POBA to OM1 followed by PCI c/w SCAD, hypothyroidism, former smoker presents to the ED for R upper leg pain. Patient states she noticed ecchymosis of R upper thigh since past few days with no associated pain, but slight discomfort. Patient states she never noticed this before and decided to wait and noticed that her bruise was getting bigger and more uncomfortable. Patient states she took Tylenol for slight discomfort. Patient reports feeling R shoulder pain and states she feels like she pulled a muscle while sitting upright. States she had x-rays done at [facility] during her last hospitalization and results were normal. Otherwise patient denies dizziness, headache, chest pain, SOB, abdominal pain, n/v/d.   Of note patient underwent cardiac cath for nstemi at [facility] on 1/14/22, with no complications and patient follows with Dr. Eliza gordon.   Of note patient diagnosed with COVID 19 on 1/13/22, COVID negative on admission   PAST MEDICAL & SURGICAL HISTORY:  Hypothyroidism    CAD (coronary artery disease)    NSTEMI (non-ST elevation myocardial infarction)    No significant past surgical history    S/P cardiac catheterization  &amp; balloon angioplasty with thrombotic lesion in OM1    PAST MEDICAL HISTORY:  CAD (coronary artery disease)     Hypothyroidism     NSTEMI (non-ST elevation myocardial infarction).    PAST SURGICAL HISTORY:  S/P cardiac catheterization & balloon angioplasty with thrombotic lesion in OM1.    FAMILY HISTORY:  Mother  Still living? Unknown  FH: HTN (hypertension), Age at diagnosis: Age Unknown  FH: hyperlipidemia, Age at diagnosis: Age Unknown  FH: type 2 diabetes, Age at diagnosis: Age Unknown.     Social History:  Social History (marital status, living situation, occupation, tobacco use, alcohol and drug use, and sexual history): Occupation: work from home,    Smoking: quit last year, Hx of 15 yr smoking <1/2 pack daily   Alcohol: socially drinks wine   Rec drug: denies   ADLs: independent  Occupation:     COVID Vaccine: Moderna x2, second dose 9/14/21       Tobacco Screening:  · Core Measure Site	Yes  · Has the patient used tobacco in the past 30 days?	No    Risk Assessment:    Present on Admission:  Deep Venous Thrombosis	yes  DVT Confirmed	Thrombophlebitis of Femoral vein  Pulmonary Embolus	no     Heart Failure:  Does this patient have a history of or has been diagnosed with heart failure? yes.    LV Function Assessment (LVS function was evaluated before arrival and/or during hospitalization) yes.     Is the Ejection Fraction >40% ? yes.     Ejection Fraction 45 %.      Medication list         MEDICATIONS  (STANDING):  atorvastatin 10 milliGRAM(s) Oral at bedtime  heparin  Infusion.  Unit(s)/Hr (20 mL/Hr) IV Continuous <Continuous>  levothyroxine 150 MICROGram(s) Oral daily  lidocaine   4% Patch 1 Patch Transdermal daily  lisinopril 2.5 milliGRAM(s) Oral daily  metoprolol succinate ER 25 milliGRAM(s) Oral daily    MEDICATIONS  (PRN):  acetaminophen     Tablet .. 650 milliGRAM(s) Oral every 8 hours PRN Mild Pain (1 - 3)  heparin   Injectable 9000 Unit(s) IV Push every 6 hours PRN For aPTT less than 40  heparin   Injectable 4500 Unit(s) IV Push every 6 hours PRN For aPTT between 40 - 57         Vitals log        ICU Vital Signs Last 24 Hrs  T(C): 37.6 (31 Jan 2022 08:00), Max: 38.2 (31 Jan 2022 00:10)  T(F): 99.6 (31 Jan 2022 08:00), Max: 100.7 (31 Jan 2022 00:10)  HR: 94 (31 Jan 2022 08:00) (89 - 101)  BP: 109/741 (31 Jan 2022 08:00) (106/70 - 124/78)  BP(mean): --  ABP: --  ABP(mean): --  RR: 18 (31 Jan 2022 08:00) (16 - 18)  SpO2: 96% (31 Jan 2022 08:00) (96% - 100%)           Input and Output:  I&O's Detail      Lab Data                        9.3    16.26 )-----------( 505      ( 31 Jan 2022 12:47 )             29.1     01-31    134<L>  |  103  |  6<L>  ----------------------------<  111<H>  3.6   |  26  |  0.84    Ca    8.4<L>      31 Jan 2022 06:40  Phos  3.3     01-31  Mg     2.1     01-31    TPro  8.2  /  Alb  2.8<L>  /  TBili  1.2  /  DBili  x   /  AST  14<L>  /  ALT  38  /  AlkPhos  81  01-31      CARDIAC MARKERS ( 31 Jan 2022 12:47 )  x     / x     / 90 U/L / x     / <1.0 ng/mL  CARDIAC MARKERS ( 31 Jan 2022 09:37 )  x     / x     / 67 U/L / x     / <1.0 ng/mL  CARDIAC MARKERS ( 31 Jan 2022 06:40 )  x     / x     / 68 U/L / x     / <1.0 ng/mL        Review of Systems	  weakness  pain  sob    Objective     Physical Examination    obese  head nc  head at  cn grossly int  verbal  alert  moves all extr  cn grossly int      Pertinent Lab findings & Imaging      Cal[place]:  NO   Adequate UO     I&O's Detail           Discussed with:     Cultures:	        Radiology    ACC: [id number] EXAM:  CT ANGIO CHEST PULM ART WAWIC                          *** ADDENDUM***    This critical value was discussed with Dr. AIDA Mccarty  by telephone at   the time of interpretation on 1/31/2022.    --- End of Report ---    *** END OF ADDENDUM***      PROCEDURE DATE:  01/31/2022          INTERPRETATION:  CLINICAL INFORMATION: Chest pain. Newly diagnosed deep   venous thrombosis.  Evaluate for pulmonary embolism.    COMPARISON: None.    CONTRAST/COMPLICATIONS:  IV Contrast: Omnipaque 350  73 cc administered   27 cc discarded  Oral Contrast: NONE  Complications: None reported at time of study completion    PROCEDURE:  CT Angiography of the Chest.  Sagittal and coronal reformats were performed as well as 3D (MIP)   reconstructions.    FINDINGS:    LUNGS AND AIRWAYS:  PLEURA: Bibasilar dependent atelectatic changes and patchy subpleural   airspace consolidation.  Small left-sided pleural effusion underlying compressive atelectasis.    Atelectatic changes inferior aspect left lingula lobe.    The central airways remain patent.    3 mm subpleural nodule anterior right upper lobe.    MEDIASTINUM AND MICKEY:  Subcentimeter short axis prevascular, right paratracheal and subcarinal   lymph nodes.    VESSELS:  There are filling defects compatible with acute pulmonary emboli   involving segmental right lower lobe pulmonary arterial branches, left   lingula pulmonary artery and superior segment left lower lobe pulmonary   artery and medial basal segment left lower lobe pulmonary arteries.    HEART: The heart is mildly enlarged, no CT evidence for right-sided heart   strain.  Small to moderate sized pericardial effusion.    CHEST WALL AND LOWER NECK:  Subcentimeter bilateral supraclavicular lymph nodes.  Bilateral axillary lymph nodes measuring up to 1 cm short axis.    VISUALIZED UPPER ABDOMEN:  Scarring upper pole left kidney.    BONES: Within normal limits.    IMPRESSION:    Bilateral pulmonary emboli involving segmental right lower lobe pulmonary   arterial branches, left lingula branch pulmonary artery and segmental   left lower lobe pulmonary arterial branches as discussed.  No CT evidence for right-sided heart strain.    Patchy atelectatic changes lung bases, cannot exclude developing   pulmonary infarct.  Small left-sided pleural effusion.    Small to moderate pericardial effusion, which is increased in size from   CT scan of the abdomen and pelvis performed on 1/30/2022.    Other findings as discussed above.    --- End of Report ---    ***Please see the addendum at the top of this report. It may contain   additional important information or changes.****        USHA GARCIA MD; Attending Radiologist  This document has been electronically signed. Jan 31 2022 11:26AM  Addend:USHA GARCIA MD; Attending Radiologist  This addendum was electronically signed on: Jan 31 2022 11:30AM.        ACC: [id number] EXAM:  US DPLX LWR EXT VEINS COMPL BI                          PROCEDURE DATE:  01/30/2022          INTERPRETATION:  CLINICAL INFORMATION: Status post cardiac catheter with   concern for greater saphenous vein clot at recent CT.    COMPARISON: CT 1/30/2022    TECHNIQUE: Duplex sonography of the BILATERAL LOWER extremity veins with   color and spectral Doppler, with and without compression.    FINDINGS:    RIGHT:  Acute thrombus is present within the greater saphenous vein extending to   the common femoral vein junction. Normal compressibility of the femoral   and popliteal veins.  Doppler examination shows normal spontaneous and phasic flow.  No RIGHT calf vein thrombosis is detected.    LEFT:  Normal compressibility of the LEFT common femoral, femoral and popliteal   veins.  Doppler examination shows normal spontaneous and phasic flow.  No LEFT calf vein thrombosis is detected.    IMPRESSION:  Acute deep venous thrombosis at the right greater saphenous vein/common   femoral vein junction.  Acute deep venous thrombosis: above the knee.    Findings were discussed with Dr. NEVA SHIPLEY  1/30/2022 5:55 PM by Dr. Crespo with read back confirmation.    --- End of Report ---            HIRO CRESPO MD; Attending Radiologist  This document has been electronically signed. Jan 30 2022  5:57PM      Conclusions:  1. Normal left ventricular internal dimensions and wall  thicknesses.  2. Mild segmental left ventricular systolic dysfunction.  Endocardial visualization enhanced with intravenous  injection of Ultrasonic Enhancing Agent (Definity). The  basal to mid anterolateral wall and the basal to mid  inferolateral wall are akinetic; borderline aneurysmal.  Overall mildly decreased left ventricular ejection  fraction.  3. Mild diastolic dysfunction (Stage I).  4. Normal right ventricular size and function.  5. Normal pericardium with no pericardial effusion.  *** No previous Echo exam.

## 2022-01-31 NOTE — DISCHARGE NOTE PROVIDER - HOSPITAL COURSE
FROM ADMISSION H+P:   HPI:  52 yo female with PMHx of CAD s/p POBA to OM1 followed by PCI c/w SCAD, hypothyroidism, former smoker presents to the ED for R upper leg pain. Patient states she noticed ecchymosis of R upper thigh since past few days with no associated pain, but slight discomfort. Patient states she never noticed this before and decided to wait and noticed that her bruise was getting bigger and more uncomfortable. Patient states she took Tylenol for slight discomfort. Patient reports feeling R shoulder pain and states she feels like she pulled a muscle while sitting upright. States she had x-rays done at [facility] during her last hospitalization and results were normal. Otherwise patient denies dizziness, headache, chest pain, SOB, abdominal pain, n/v/d.   Of note patient underwent cardiac cath for nstemi at [facility] on 1/14/22, with no complications and patient follows with Dr. Eliza gordon.   Of note patient diagnosed with COVID 19 on 1/13/22, COVID negative on admission     ED course:  Vitals: T102.6, , /80, RR 18, SPO2%: 100%  Labs: Hgb 9.3, HCT 29.3, Plt 543, PT 16, INR 1.39, Urinanalysis with trace LE, no nitrites and few bacteria   CT A/P w/ IV contrast: Small hemorrhage in the extraperitoneal space of the right pelvis along the right pelvic sidewall without discrete collection .Small pericardial effusion with concern for mild pericardial enhancement which may be infectious or inflammatory. A 1.6 cm indeterminate left hepatic lobe lesion.  Doppler US LE: Acute deep venous thrombosis at the right greater saphenous vein/common femoral vein junction.  Given in the ED: Lovenox 110mg subq x 1 (30 Jan 2022 19:20)      ---  HOSPITAL COURSE: Patient admitted on 1/30 for RLE DVT. Patient was treated with heparin gtt. Cardiology and vascular surgery were consulted.    The patient was examined at the bedside on day of discharge. On day of discharge, patient is stable for discharge to ________([place]/etc.)__.    Physical exam on day of discharge:    ---  CONSULTANTS:   Cardiology - Dr. Santos  Vascular Surgery - Dr. Carbajal    ---  TIME SPENT:  I, the attending physician, was physically present for the key portions of the evaluation and management (E/M) service provided. The total amount of time spent reviewing the hospital notes, laboratory values, imaging findings, assessing/counseling the patient, discussing with consultant physicians, social work, nursing staff was -- minutes    ---  Primary care provider was made aware of plan for discharge:      [  ] NO     [  ] YES   FROM ADMISSION H+P:   HPI:  52 yo female with PMHx of CAD s/p POBA to OM1 followed by PCI c/w SCAD, hypothyroidism, former smoker presents to the ED for R upper leg pain. Patient states she noticed ecchymosis of R upper thigh since past few days with no associated pain, but slight discomfort. Patient states she never noticed this before and decided to wait and noticed that her bruise was getting bigger and more uncomfortable. Patient states she took Tylenol for slight discomfort. Patient reports feeling R shoulder pain and states she feels like she pulled a muscle while sitting upright. States she had x-rays done at [facility] during her last hospitalization and results were normal. Otherwise patient denies dizziness, headache, chest pain, SOB, abdominal pain, n/v/d.   Of note patient underwent cardiac cath for nstemi at [facility] on 1/14/22, with no complications and patient follows with Dr. Eliza gordon.   Of note patient diagnosed with COVID 19 on 1/13/22, COVID negative on admission     ED course:  Vitals: T102.6, , /80, RR 18, SPO2%: 100%  Labs: Hgb 9.3, HCT 29.3, Plt 543, PT 16, INR 1.39, Urinanalysis with trace LE, no nitrites and few bacteria   CT A/P w/ IV contrast: Small hemorrhage in the extraperitoneal space of the right pelvis along the right pelvic sidewall without discrete collection .Small pericardial effusion with concern for mild pericardial enhancement which may be infectious or inflammatory. A 1.6 cm indeterminate left hepatic lobe lesion.  Doppler US LE: Acute deep venous thrombosis at the right greater saphenous vein/common femoral vein junction.  Given in the ED: Lovenox 110mg subq x 1 (30 Jan 2022 19:20)      ---  HOSPITAL COURSE: Patient admitted on 1/30 for RLE DVT. Patient was treated with heparin gtt. Pt was found to have elevated Troponins. Cardiology and vascular surgery were consulted.     The patient was examined at the bedside on day of discharge. On day of discharge, patient is stable for discharge to ________(home/[place]/etc.)__.    Physical exam on day of discharge:    ---  CONSULTANTS:   Cardiology - Dr. Santos  Vascular Surgery - Dr. Carbajal    ---  TIME SPENT:  I, the attending physician, was physically present for the key portions of the evaluation and management (E/M) service provided. The total amount of time spent reviewing the hospital notes, laboratory values, imaging findings, assessing/counseling the patient, discussing with consultant physicians, social work, nursing staff was -- minutes    ---  Primary care provider was made aware of plan for discharge:      [  ] NO     [  ] YES   FROM ADMISSION H+P:   HPI:  50 yo female with PMHx of CAD s/p POBA to OM1 followed by PCI c/w SCAD, hypothyroidism, former smoker presents to the ED for R upper leg pain. Patient states she noticed ecchymosis of R upper thigh since past few days with no associated pain, but slight discomfort. Patient states she never noticed this before and decided to wait and noticed that her bruise was getting bigger and more uncomfortable. Patient states she took Tylenol for slight discomfort. Patient reports feeling R shoulder pain and states she feels like she pulled a muscle while sitting upright. States she had x-rays done at [facility] during her last hospitalization and results were normal. Otherwise patient denies dizziness, headache, chest pain, SOB, abdominal pain, n/v/d.   Of note patient underwent cardiac cath for nstemi at [facility] on 1/14/22, with no complications and patient follows with Dr. Eliza gordon.   Of note patient diagnosed with COVID 19 on 1/13/22, COVID negative on admission     ED course:  Vitals: T102.6, , /80, RR 18, SPO2%: 100%  Labs: Hgb 9.3, HCT 29.3, Plt 543, PT 16, INR 1.39, Urinanalysis with trace LE, no nitrites and few bacteria   CT A/P w/ IV contrast: Small hemorrhage in the extraperitoneal space of the right pelvis along the right pelvic sidewall without discrete collection .Small pericardial effusion with concern for mild pericardial enhancement which may be infectious or inflammatory. A 1.6 cm indeterminate left hepatic lobe lesion.  Doppler US LE: Acute deep venous thrombosis at the right greater saphenous vein/common femoral vein junction.  Given in the ED: Lovenox 110mg subq x 1 (30 Jan 2022 19:20)      ---  HOSPITAL COURSE: Patient admitted on 1/30 for RLE DVT. Patient was treated with heparin gtt. On 1/31, CTA showed b/l PE. Pt was found to have elevated Troponins. Cardiology(Dr. Garcia) was consulted who recommended discontinuing asa and only dual therapy with plavix and eliquis given most recent procedure involved only poba with no pci. Repeat dopplers on 2/2 confirmed unchanged RLE dvt.     The patient was examined at the bedside on day of discharge. On day of discharge, patient is stable for discharge to home    Physical exam on day of discharge:    ---  CONSULTANTS:   Cardiology - Dr. Santos  Vascular Surgery - Dr. Kapoor    ---  TIME SPENT:  I, the attending physician, was physically present for the key portions of the evaluation and management (E/M) service provided. The total amount of time spent reviewing the hospital notes, laboratory values, imaging findings, assessing/counseling the patient, discussing with consultant physicians, social work, nursing staff was -- minutes    ---  Primary care provider was made aware of plan for discharge:      [  ] NO     [  ] YES   FROM ADMISSION H+P:   HPI:  52 yo female with PMHx of CAD s/p POBA to OM1 followed by PCI c/w SCAD, hypothyroidism, former smoker presents to the ED for R upper leg pain. Patient states she noticed ecchymosis of R upper thigh since past few days with no associated pain, but slight discomfort. Patient states she never noticed this before and decided to wait and noticed that her bruise was getting bigger and more uncomfortable. Patient states she took Tylenol for slight discomfort. Patient reports feeling R shoulder pain and states she feels like she pulled a muscle while sitting upright. States she had x-rays done at [facility] during her last hospitalization and results were normal. Otherwise patient denies dizziness, headache, chest pain, SOB, abdominal pain, n/v/d.   Of note patient underwent cardiac cath for nstemi at [facility] on 1/14/22, with no complications and patient follows with Dr. Eliza gordon.   Of note patient diagnosed with COVID 19 on 1/13/22, COVID negative on admission     ED course:  Vitals: T102.6, , /80, RR 18, SPO2%: 100%  Labs: Hgb 9.3, HCT 29.3, Plt 543, PT 16, INR 1.39, Urinanalysis with trace LE, no nitrites and few bacteria   CT A/P w/ IV contrast: Small hemorrhage in the extraperitoneal space of the right pelvis along the right pelvic sidewall without discrete collection .Small pericardial effusion with concern for mild pericardial enhancement which may be infectious or inflammatory. A 1.6 cm indeterminate left hepatic lobe lesion.  Doppler US LE: Acute deep venous thrombosis at the right greater saphenous vein/common femoral vein junction.  Given in the ED: Lovenox 110mg subq x 1 (30 Jan 2022 19:20)      ---  HOSPITAL COURSE: Patient admitted on 1/30 for RLE DVT. Patient was treated with heparin gtt. On 1/31, CTA showed b/l PE. Pt was found to have elevated Troponins. Cardiology(Dr. Garcia) was consulted who recommended discontinuing asa and only dual therapy with plavix and eliquis given most recent procedure involved only poba with no pci. Repeat dopplers on 2/2 confirmed unchanged RLE dvt. Pt had a TTE done on 2/2, which showed EF 50-55%, with small pericardial effusion noted.     The patient was examined at the bedside on day of discharge. On day of discharge, patient is stable for discharge to home    Physical exam on day of discharge:    ---  CONSULTANTS:   Cardiology - Dr. Santos  Vascular Surgery - Dr. Kapoor    ---  TIME SPENT:  I, the attending physician, was physically present for the key portions of the evaluation and management (E/M) service provided. The total amount of time spent reviewing the hospital notes, laboratory values, imaging findings, assessing/counseling the patient, discussing with consultant physicians, social work, nursing staff was -- minutes    ---  Primary care provider was made aware of plan for discharge:      [  ] NO     [  ] YES   FROM ADMISSION H+P:   HPI:  50 yo female with PMHx of CAD s/p POBA to OM1 followed by PCI c/w SCAD, hypothyroidism, former smoker presents to the ED for R upper leg pain. Patient states she noticed ecchymosis of R upper thigh since past few days with no associated pain, but slight discomfort. Patient states she never noticed this before and decided to wait and noticed that her bruise was getting bigger and more uncomfortable. Patient states she took Tylenol for slight discomfort. Patient reports feeling R shoulder pain and states she feels like she pulled a muscle while sitting upright. States she had x-rays done at [facility] during her last hospitalization and results were normal. Otherwise patient denies dizziness, headache, chest pain, SOB, abdominal pain, n/v/d.   Of note patient underwent cardiac cath for nstemi at [facility] on 1/14/22, with no complications and patient follows with Dr. Eliza gordon.   Of note patient diagnosed with COVID 19 on 1/13/22, COVID negative on admission     ED course:  Vitals: T102.6, , /80, RR 18, SPO2%: 100%  Labs: Hgb 9.3, HCT 29.3, Plt 543, PT 16, INR 1.39, Urinanalysis with trace LE, no nitrites and few bacteria   CT A/P w/ IV contrast: Small hemorrhage in the extraperitoneal space of the right pelvis along the right pelvic sidewall without discrete collection .Small pericardial effusion with concern for mild pericardial enhancement which may be infectious or inflammatory. A 1.6 cm indeterminate left hepatic lobe lesion.  Doppler US LE: Acute deep venous thrombosis at the right greater saphenous vein/common femoral vein junction.  Given in the ED: Lovenox 110mg subq x 1 (30 Jan 2022 19:20)      ---  HOSPITAL COURSE: Patient admitted on 1/30 for RLE DVT. Patient was treated with heparin gtt. On 1/31, CTA showed b/l PE. Pt was found to have elevated Troponins. Cardiology(Dr. Garcia) was consulted who recommended discontinuing asa and only dual therapy with plavix and eliquis given most recent procedure involved only poba with no pci. Repeat dopplers on 2/2 confirmed unchanged RLE dvt. Pt had a TTE done on 1/31 and repeat 2/2, the latter which showed EF 50-55%, with small stable pericardial effusion without evidence of hemodynamic compromise. Hemoglobin was decreased throughout hospitalization but remained stable at time of discharge.    The patient was examined at the bedside on day of discharge. On day of discharge, patient is stable for discharge to home    Physical exam on day of discharge:  Vital Signs Last 24 Hrs  T(C): 37.3 (03 Feb 2022 11:07), Max: 37.4 (02 Feb 2022 20:15)  T(F): 99.2 (03 Feb 2022 11:07), Max: 99.3 (02 Feb 2022 20:15)  HR: 93 (03 Feb 2022 11:07) (86 - 100)  BP: 102/69 (03 Feb 2022 11:07) (101/67 - 117/74)  BP(mean): --  RR: 20 (03 Feb 2022 11:07) (18 - 20)  SpO2: 93% (03 Feb 2022 11:07) (93% - 93%)    GENERAL: NAD, resting comfortably  HEENT:  anicteric, moist mucous membranes  CHEST/LUNG:  b/l breath sounds diminished but clear, no rales, wheezes, or rhonchi  HEART:  tachy, S1, S2, no murmurs, rubs, or gallops  ABDOMEN:  Soft, nontender, nondistended  EXTREMITIES: no pedal edema, cyanosis, or calf tenderness  NERVOUS SYSTEM: answers questions and follows commands appropriately    ---  CONSULTANTS:   Cardiology - Dr. Santos  Vascular Surgery - Dr. Kapoor  Pulmonology - Dr. Cochran    ---  TIME SPENT:  I, the attending physician, was physically present for the key portions of the evaluation and management (E/M) service provided. The total amount of time spent reviewing the hospital notes, laboratory values, imaging findings, assessing/counseling the patient, discussing with consultant physicians, social work, nursing staff was -- minutes    ---  Primary care provider was made aware of plan for discharge:      [  ] NO     [  ] YES   FROM ADMISSION H+P:   HPI:  50 yo female with PMHx of CAD s/p POBA to OM1 followed by PCI c/w SCAD, hypothyroidism, former smoker presents to the ED for R upper leg pain. Patient states she noticed ecchymosis of R upper thigh since past few days with no associated pain, but slight discomfort. Patient states she never noticed this before and decided to wait and noticed that her bruise was getting bigger and more uncomfortable. Patient states she took Tylenol for slight discomfort. Patient reports feeling R shoulder pain and states she feels like she pulled a muscle while sitting upright. States she had x-rays done at [facility] during her last hospitalization and results were normal. Otherwise patient denies dizziness, headache, chest pain, SOB, abdominal pain, n/v/d.   Of note patient underwent cardiac cath for nstemi at [facility] on 1/14/22, with no complications and patient follows with Dr. Eliza gordon.     ---  HOSPITAL COURSE:   Patient admitted on 1/30 for RLE DVT. Patient was treated with heparin gtt. On 1/31, CTA showed b/l PE. Pt was found to have elevated Troponins. Cardiology was consulted who recommended discontinuing asa and only dual therapy with Plavix and Eliquis given most recent procedure involved only poba with no pci. Repeat dopplers on 2/2/22 confirmed unchanged RLE dvt. Pt had a TTE done on 1/31 and repeat 2/2, the latter which showed EF 50-55%, with small stable pericardial effusion without evidence of hemodynamic compromise. Hemoglobin was decreased throughout hospitalization but remained stable at time of discharge. Will need outpatient follow up to further monitor h/h.     The patient was examined at the bedside on day of discharge. On day of discharge, patient is stable for discharge to home    Physical exam on day of discharge:  Vital Signs Last 24 Hrs  T(C): 37.3 (03 Feb 2022 11:07), Max: 37.4 (02 Feb 2022 20:15)  T(F): 99.2 (03 Feb 2022 11:07), Max: 99.3 (02 Feb 2022 20:15)  HR: 93 (03 Feb 2022 11:07) (86 - 100)  BP: 102/69 (03 Feb 2022 11:07) (101/67 - 117/74)  BP(mean): --  RR: 20 (03 Feb 2022 11:07) (18 - 20)  SpO2: 93% (03 Feb 2022 11:07) (93% - 93%)    GENERAL: NAD, resting comfortably  HEENT:  anicteric, moist mucous membranes  CHEST/LUNG:  b/l breath sounds diminished but clear, no rales, wheezes, or rhonchi  HEART:  tachy, S1, S2, no murmurs, rubs, or gallops  ABDOMEN:  Soft, nontender, nondistended  EXTREMITIES: no pedal edema, cyanosis, or calf tenderness  NERVOUS SYSTEM: answers questions and follows commands appropriately    ---  CONSULTANTS:   Cardiology - Dr. Santos  Vascular Surgery - Dr. Kapoor  Pulmonology - Dr. Cochran    ---  TIME SPENT:  I, the attending physician, was physically present for the key portions of the evaluation and management (E/M) service provided. The total amount of time spent reviewing the hospital notes, laboratory values, imaging findings, assessing/counseling the patient, discussing with consultant physicians, social work, nursing staff was 42 minutes FROM ADMISSION H+P:   HPI:  50 yo female with PMHx of CAD s/p POBA to OM1 followed by PCI c/w SCAD, hypothyroidism, former smoker presents to the ED for R upper leg pain. Patient states she noticed ecchymosis of R upper thigh since past few days with no associated pain, but slight discomfort. Patient states she never noticed this before and decided to wait and noticed that her bruise was getting bigger and more uncomfortable. Patient states she took Tylenol for slight discomfort. Patient reports feeling R shoulder pain and states she feels like she pulled a muscle while sitting upright. States she had x-rays done at [facility] during her last hospitalization and results were normal. Otherwise patient denies dizziness, headache, chest pain, SOB, abdominal pain, n/v/d.   Of note patient underwent cardiac cath for nstemi at [facility] on 1/14/22, with no complications and patient follows with Dr. Eliza gordon.     ---  HOSPITAL COURSE:   Patient admitted on 1/30 for RLE DVT. Patient was treated with heparin gtt. On 1/31, CTA showed b/l PE. Pt was found to have elevated Troponins. Cardiology was consulted who recommended discontinuing asa and only dual therapy with Plavix and Eliquis given most recent procedure involved only poba with no pci. Repeat dopplers on 2/2/22 confirmed unchanged RLE dvt. Pt had a TTE done on 1/31 and repeat 2/2, the latter which showed EF 50-55%, with small stable pericardial effusion without evidence of hemodynamic compromise. Hemoglobin was decreased throughout hospitalization but remained stable at time of discharge. Will need outpatient follow up to further monitor h/h. Was also found to have isolated episode of high grade fever on 2/3 and blood cx were ngtd. It is suspected that the fever was due to the active, but stable PE.     The patient was examined at the bedside on day of discharge. On day of discharge, patient is stable for discharge to home    Physical exam on day of discharge:  Vital Signs Last 24 Hrs  T(C): 36.9 (05 Feb 2022 05:19), Max: 36.9 (05 Feb 2022 05:19)  T(F): 98.5 (05 Feb 2022 05:19), Max: 98.5 (05 Feb 2022 05:19)  HR: 94 (05 Feb 2022 05:19) (91 - 94)  BP: 115/77 (05 Feb 2022 05:19) (111/75 - 115/77)  BP(mean): --  RR: 18 (05 Feb 2022 05:19) (18 - 19)  SpO2: 95% (05 Feb 2022 05:19) (95% - 98%)    GENERAL: NAD, resting comfortably  HEENT:  anicteric, moist mucous membranes  CHEST/LUNG:  b/l breath sounds diminished but clear, no rales, wheezes, or rhonchi  HEART:  tachy, S1, S2, no murmurs, rubs, or gallops  ABDOMEN:  Soft, nontender, nondistended  EXTREMITIES: no pedal edema, cyanosis, or calf tenderness  NERVOUS SYSTEM: answers questions and follows commands appropriately    ---  CONSULTANTS:   Cardiology - Dr. Santos  Vascular Surgery - Dr. Kapoor  Pulmonology - Dr. Cochran    ---  TIME SPENT:  I, the attending physician, was physically present for the key portions of the evaluation and management (E/M) service provided. The total amount of time spent reviewing the hospital notes, laboratory values, imaging findings, assessing/counseling the patient, discussing with consultant physicians, social work, nursing staff was 42 minutes FROM ADMISSION H+P:   HPI:  50 yo female with PMHx of CAD s/p POBA to OM1 followed by PCI c/w SCAD, hypothyroidism, former smoker presents to the ED for R upper leg pain. Patient states she noticed ecchymosis of R upper thigh since past few days with no associated pain, but slight discomfort. Patient states she never noticed this before and decided to wait and noticed that her bruise was getting bigger and more uncomfortable. Patient states she took Tylenol for slight discomfort. Patient reports feeling R shoulder pain and states she feels like she pulled a muscle while sitting upright. States she had x-rays done at [facility] during her last hospitalization and results were normal. Otherwise patient denies dizziness, headache, chest pain, SOB, abdominal pain, n/v/d.   Of note patient underwent cardiac cath for nstemi at [facility] on 1/14/22, with no complications and patient follows with Dr. Eliza gordon.     ---  HOSPITAL COURSE:   Patient admitted on 1/30 for RLE DVT. Patient was treated with heparin gtt. On 1/31, CTA showed b/l PE. Pt was found to have elevated Troponins. Cardiology was consulted who recommended discontinuing asa and only dual therapy with Plavix and Eliquis given most recent procedure involved only poba with no pci. Repeat dopplers on 2/2/22 confirmed unchanged RLE dvt. Pt had a TTE done on 1/31 and repeat 2/2, the latter which showed EF 50-55%, with small stable pericardial effusion without evidence of hemodynamic compromise. Hemoglobin was decreased throughout hospitalization but remained stable at time of discharge. Will need outpatient follow up to further monitor h/h. Was also found to have isolated episode of high grade fever on 2/3 and blood cx were ngtd. It is suspected that the fever was due to the active, but stable PE with a possible small pulmonary infarct. There was no hemodynamic instability and fevers spontaneously resolved.     The patient was examined at the bedside on day of discharge. On day of discharge, patient is stable for discharge to home    Physical exam on day of discharge:  Vital Signs Last 24 Hrs  T(C): 36.9 (05 Feb 2022 05:19), Max: 36.9 (05 Feb 2022 05:19)  T(F): 98.5 (05 Feb 2022 05:19), Max: 98.5 (05 Feb 2022 05:19)  HR: 94 (05 Feb 2022 05:19) (91 - 94)  BP: 115/77 (05 Feb 2022 05:19) (111/75 - 115/77)  BP(mean): --  RR: 18 (05 Feb 2022 05:19) (18 - 19)  SpO2: 95% (05 Feb 2022 05:19) (95% - 98%)    GENERAL: NAD, resting comfortably  HEENT:  anicteric, moist mucous membranes  CHEST/LUNG:  b/l breath sounds diminished but clear, no rales, wheezes, or rhonchi  HEART:  tachy, S1, S2, no murmurs, rubs, or gallops  ABDOMEN:  Soft, nontender, nondistended  EXTREMITIES: no pedal edema, cyanosis, or calf tenderness  NERVOUS SYSTEM: answers questions and follows commands appropriately    ---  CONSULTANTS:   Cardiology - Dr. Santos  Vascular Surgery - Dr. Kapoor  Pulmonology - Dr. Cochran    ---  TIME SPENT:  I, the attending physician, was physically present for the key portions of the evaluation and management (E/M) service provided. The total amount of time spent reviewing the hospital notes, laboratory values, imaging findings, assessing/counseling the patient, discussing with consultant physicians, social work, nursing staff was 42 minutes

## 2022-01-31 NOTE — CHART NOTE - NSCHARTNOTEFT_GEN_A_CORE
Called by RN for chest pain. Pt seen and examined at bedside. Patient states she had chest pressure beginning last night, but was too tired to report to staff. She states the chest pain is 8/10 laying down and 4/10 while sitting up. Pain is nonradiating. Patient also admits to pain in the R medial thigh, unchanged from admission. Patient does not admit to shortness of breath, nausea, vomiting.      Allergies    No Known Allergies    Intolerances        Vitals  Vital Signs Last 24 Hrs  T(C): 37.4 (2022 05:51), Max: 39.2 (2022 10:39)  T(F): 99.3 (2022 05:51), Max: 102.6 (2022 10:39)  HR: 97 (2022 05:51) (89 - 102)  BP: 106/70 (2022 05:51) (106/70 - 138/80)  BP(mean): --  RR: 18 (2022 05:51) (16 - 18)  SpO2: 97% (2022 05:51) (97% - 100%)      General: WN/WD NAD  Respiratory: CTA B/L, no wheezes, rales, rhonchi  CV: RRR, S1S2, no murmurs, rubs or gallops  Abdominal: Soft, NT, ND +BS  Extremities: No edema, warm, well perfused,  + peripheral pulses  Neurology: A&Ox3, nonfocal       LABS:                        9.3    10.00 )-----------( 543      ( 2022 11:45 )             29.3     -    138  |  107  |  6<L>  ----------------------------<  95  3.8   |  27  |  0.90    Ca    8.5      2022 11:50      PT/INR - ( 2022 11:45 )   PT: 16.0 sec;   INR: 1.39 ratio         PTT - ( 2022 11:45 )  PTT:30.4 sec  Urinalysis Basic - ( 2022 13:44 )    Color: Yellow / Appearance: Slightly Turbid / S.010 / pH: x  Gluc: x / Ketone: Negative  / Bili: Negative / Urobili: 8   Blood: x / Protein: 30 mg/dL / Nitrite: Negative   Leuk Esterase: Trace / RBC: 6-10 /HPF / WBC 6-10   Sq Epi: x / Non Sq Epi: Occasional / Bacteria: Few            RADIOLOGY  A/P: 52 yo female with PMHx of hypothyroidism, CAD/NSTEMI s/p POBA to OM1 followed by PCI c/w SCAD, former smoker admitted for R acute DVT at greater saphenous vein/common femoral vein junction. Called by RN for chest pain.    - chest pain described as pressure, of note patient did have NSTEMI 2 weeks prior with PCI   - stat EKG similar to prior from 22  - ordered 1 x 325 ASA  - c/w BB and heparin ggt  - f/u stat cardiac enzymes Called by RN for chest pain. Pt seen and examined at bedside. Patient states she had chest pressure beginning last night, but was too tired to report to staff. She states the chest pain is 8/10 laying down and 4/10 while sitting up. Pain is nonradiating. Patient also admits to pain in the R medial thigh, unchanged from admission. Patient does not admit to shortness of breath, nausea, vomiting.      Allergies    No Known Allergies    Intolerances        Vitals  Vital Signs Last 24 Hrs  T(C): 37.4 (2022 05:51), Max: 39.2 (2022 10:39)  T(F): 99.3 (2022 05:51), Max: 102.6 (2022 10:39)  HR: 97 (2022 05:51) (89 - 102)  BP: 106/70 (2022 05:51) (106/70 - 138/80)  BP(mean): --  RR: 18 (2022 05:51) (16 - 18)  SpO2: 97% (2022 05:51) (97% - 100%)      General: WN/WD NAD  Respiratory: CTA B/L, no wheezes, rales, rhonchi  CV: RRR, S1S2, no murmurs, rubs or gallops  Abdominal: Soft, NT, ND +BS  Extremities: No edema, warm, well perfused,  + peripheral pulses  Neurology: A&Ox3, nonfocal       LABS:                        9.3    10.00 )-----------( 543      ( 2022 11:45 )             29.3     -    138  |  107  |  6<L>  ----------------------------<  95  3.8   |  27  |  0.90    Ca    8.5      2022 11:50      PT/INR - ( 2022 11:45 )   PT: 16.0 sec;   INR: 1.39 ratio         PTT - ( 2022 11:45 )  PTT:30.4 sec  Urinalysis Basic - ( 2022 13:44 )    Color: Yellow / Appearance: Slightly Turbid / S.010 / pH: x  Gluc: x / Ketone: Negative  / Bili: Negative / Urobili: 8   Blood: x / Protein: 30 mg/dL / Nitrite: Negative   Leuk Esterase: Trace / RBC: 6-10 /HPF / WBC 6-10   Sq Epi: x / Non Sq Epi: Occasional / Bacteria: Few            RADIOLOGY  A/P: 52 yo female with PMHx of hypothyroidism, CAD/NSTEMI s/p POBA to OM1 followed by PCI c/w SCAD, former smoker admitted for R acute DVT at greater saphenous vein/common femoral vein junction. Called by RN for chest pain.    - chest pain described as pressure, of note patient did have NSTEMI 2 weeks prior with PCI   - stat EKG similar to prior from 22  - ordered 1 x 325 ASA  - c/w BB and heparin ggt  - f/u stat cardiac enzymes  - f/u proBNP  - RN to call for any changes

## 2022-01-31 NOTE — DISCHARGE NOTE PROVIDER - NSDCFUADDAPPT_GEN_ALL_CORE_FT
Recommend follow up with your primary care doctor, cardiologist, and vascular doctor within 1 week of discharge

## 2022-01-31 NOTE — CONSULT NOTE ADULT - CONSULT REASON
DVT/PE with chest pain  small R extraperitoneal hemorrhage
PE  Obesity  DVT
RLE DVT
Hematoma post cath

## 2022-01-31 NOTE — PROGRESS NOTE ADULT - PROBLEM SELECTOR PLAN 5
Patient complaining of L shoulder pain from [facility] when she pulled on something  - X-ray L shoulder 1/13 w/ no acute findings   - Uses Tylenol and heating pad at home   - Will continue Tylenol PRN for mild pain and start on Lidocaine patch

## 2022-01-31 NOTE — PROGRESS NOTE ADULT - PROBLEM SELECTOR PLAN 2
- pt with pleuritic chest pain that improves with upright positioning, history of MI earlier this month, possible post-MI pericarditis  - cardiology following  - pericardial effusion seen on CTA - growing in size  - f/u TTE and trend to monitor size of effusion  - trend CBC q6h  - pain control prn - tylenol prn available, received morphine 2mg IV x1

## 2022-02-01 ENCOUNTER — TRANSCRIPTION ENCOUNTER (OUTPATIENT)
Age: 52
End: 2022-02-01

## 2022-02-01 ENCOUNTER — APPOINTMENT (OUTPATIENT)
Dept: CARDIOLOGY | Facility: CLINIC | Age: 52
End: 2022-02-01

## 2022-02-01 LAB
ALBUMIN SERPL ELPH-MCNC: 2.6 G/DL — LOW (ref 3.3–5)
ALP SERPL-CCNC: 78 U/L — SIGNIFICANT CHANGE UP (ref 40–120)
ALT FLD-CCNC: 24 U/L — SIGNIFICANT CHANGE UP (ref 12–78)
ANION GAP SERPL CALC-SCNC: 7 MMOL/L — SIGNIFICANT CHANGE UP (ref 5–17)
APTT BLD: 88.3 SEC — HIGH (ref 27.5–35.5)
AST SERPL-CCNC: 11 U/L — LOW (ref 15–37)
BASOPHILS # BLD AUTO: 0.03 K/UL — SIGNIFICANT CHANGE UP (ref 0–0.2)
BASOPHILS NFR BLD AUTO: 0.2 % — SIGNIFICANT CHANGE UP (ref 0–2)
BILIRUB SERPL-MCNC: 1 MG/DL — SIGNIFICANT CHANGE UP (ref 0.2–1.2)
BUN SERPL-MCNC: 6 MG/DL — LOW (ref 7–23)
CALCIUM SERPL-MCNC: 8.5 MG/DL — SIGNIFICANT CHANGE UP (ref 8.5–10.1)
CHLORIDE SERPL-SCNC: 103 MMOL/L — SIGNIFICANT CHANGE UP (ref 96–108)
CK MB BLD-MCNC: <1.2 % — SIGNIFICANT CHANGE UP (ref 0–3.5)
CK MB CFR SERPL CALC: <1 NG/ML — SIGNIFICANT CHANGE UP (ref 0–3.6)
CK SERPL-CCNC: 86 U/L — SIGNIFICANT CHANGE UP (ref 26–192)
CO2 SERPL-SCNC: 26 MMOL/L — SIGNIFICANT CHANGE UP (ref 22–31)
CREAT SERPL-MCNC: 0.8 MG/DL — SIGNIFICANT CHANGE UP (ref 0.5–1.3)
EOSINOPHIL # BLD AUTO: 0.12 K/UL — SIGNIFICANT CHANGE UP (ref 0–0.5)
EOSINOPHIL NFR BLD AUTO: 1 % — SIGNIFICANT CHANGE UP (ref 0–6)
GLUCOSE SERPL-MCNC: 102 MG/DL — HIGH (ref 70–99)
HCT VFR BLD CALC: 27.2 % — LOW (ref 34.5–45)
HGB BLD-MCNC: 8.5 G/DL — LOW (ref 11.5–15.5)
IMM GRANULOCYTES NFR BLD AUTO: 0.5 % — SIGNIFICANT CHANGE UP (ref 0–1.5)
LYMPHOCYTES # BLD AUTO: 16.9 % — SIGNIFICANT CHANGE UP (ref 13–44)
LYMPHOCYTES # BLD AUTO: 2.07 K/UL — SIGNIFICANT CHANGE UP (ref 1–3.3)
MAGNESIUM SERPL-MCNC: 2.3 MG/DL — SIGNIFICANT CHANGE UP (ref 1.6–2.6)
MCHC RBC-ENTMCNC: 26.6 PG — LOW (ref 27–34)
MCHC RBC-ENTMCNC: 31.3 GM/DL — LOW (ref 32–36)
MCV RBC AUTO: 85.3 FL — SIGNIFICANT CHANGE UP (ref 80–100)
MONOCYTES # BLD AUTO: 1.21 K/UL — HIGH (ref 0–0.9)
MONOCYTES NFR BLD AUTO: 9.9 % — SIGNIFICANT CHANGE UP (ref 2–14)
NEUTROPHILS # BLD AUTO: 8.76 K/UL — HIGH (ref 1.8–7.4)
NEUTROPHILS NFR BLD AUTO: 71.5 % — SIGNIFICANT CHANGE UP (ref 43–77)
NRBC # BLD: 0 /100 WBCS — SIGNIFICANT CHANGE UP (ref 0–0)
PHOSPHATE SERPL-MCNC: 3.1 MG/DL — SIGNIFICANT CHANGE UP (ref 2.5–4.5)
PLATELET # BLD AUTO: 436 K/UL — HIGH (ref 150–400)
POTASSIUM SERPL-MCNC: 3.4 MMOL/L — LOW (ref 3.5–5.3)
POTASSIUM SERPL-SCNC: 3.4 MMOL/L — LOW (ref 3.5–5.3)
PROT SERPL-MCNC: 8 G/DL — SIGNIFICANT CHANGE UP (ref 6–8.3)
RBC # BLD: 3.19 M/UL — LOW (ref 3.8–5.2)
RBC # FLD: 15.4 % — HIGH (ref 10.3–14.5)
SODIUM SERPL-SCNC: 136 MMOL/L — SIGNIFICANT CHANGE UP (ref 135–145)
TROPONIN I, HIGH SENSITIVITY RESULT: 684.3 NG/L — HIGH
WBC # BLD: 12.25 K/UL — HIGH (ref 3.8–10.5)
WBC # FLD AUTO: 12.25 K/UL — HIGH (ref 3.8–10.5)

## 2022-02-01 PROCEDURE — 99232 SBSQ HOSP IP/OBS MODERATE 35: CPT

## 2022-02-01 PROCEDURE — 99233 SBSQ HOSP IP/OBS HIGH 50: CPT | Mod: GC

## 2022-02-01 RX ORDER — POTASSIUM CHLORIDE 20 MEQ
40 PACKET (EA) ORAL EVERY 4 HOURS
Refills: 0 | Status: COMPLETED | OUTPATIENT
Start: 2022-02-01 | End: 2022-02-01

## 2022-02-01 RX ORDER — IBUPROFEN 200 MG
600 TABLET ORAL EVERY 8 HOURS
Refills: 0 | Status: DISCONTINUED | OUTPATIENT
Start: 2022-02-01 | End: 2022-02-01

## 2022-02-01 RX ORDER — PANTOPRAZOLE SODIUM 20 MG/1
40 TABLET, DELAYED RELEASE ORAL
Refills: 0 | Status: DISCONTINUED | OUTPATIENT
Start: 2022-02-01 | End: 2022-02-05

## 2022-02-01 RX ORDER — IBUPROFEN 200 MG
600 TABLET ORAL EVERY 8 HOURS
Refills: 0 | Status: DISCONTINUED | OUTPATIENT
Start: 2022-02-01 | End: 2022-02-02

## 2022-02-01 RX ADMIN — Medication 600 MILLIGRAM(S): at 20:04

## 2022-02-01 RX ADMIN — HEPARIN SODIUM 2000 UNIT(S)/HR: 5000 INJECTION INTRAVENOUS; SUBCUTANEOUS at 19:07

## 2022-02-01 RX ADMIN — LIDOCAINE 1 PATCH: 4 CREAM TOPICAL at 01:16

## 2022-02-01 RX ADMIN — Medication 600 MILLIGRAM(S): at 14:40

## 2022-02-01 RX ADMIN — ATORVASTATIN CALCIUM 10 MILLIGRAM(S): 80 TABLET, FILM COATED ORAL at 21:52

## 2022-02-01 RX ADMIN — HEPARIN SODIUM 2000 UNIT(S)/HR: 5000 INJECTION INTRAVENOUS; SUBCUTANEOUS at 07:28

## 2022-02-01 RX ADMIN — Medication 600 MILLIGRAM(S): at 21:54

## 2022-02-01 RX ADMIN — HYDROMORPHONE HYDROCHLORIDE 0.5 MILLIGRAM(S): 2 INJECTION INTRAMUSCULAR; INTRAVENOUS; SUBCUTANEOUS at 00:38

## 2022-02-01 RX ADMIN — HEPARIN SODIUM 2000 UNIT(S)/HR: 5000 INJECTION INTRAVENOUS; SUBCUTANEOUS at 06:26

## 2022-02-01 RX ADMIN — Medication 600 MILLIGRAM(S): at 02:41

## 2022-02-01 RX ADMIN — Medication 150 MICROGRAM(S): at 05:40

## 2022-02-01 RX ADMIN — Medication 40 MILLIEQUIVALENT(S): at 12:43

## 2022-02-01 RX ADMIN — PANTOPRAZOLE SODIUM 40 MILLIGRAM(S): 20 TABLET, DELAYED RELEASE ORAL at 05:41

## 2022-02-01 RX ADMIN — LIDOCAINE 1 PATCH: 4 CREAM TOPICAL at 19:52

## 2022-02-01 RX ADMIN — Medication 40 MILLIEQUIVALENT(S): at 15:39

## 2022-02-01 RX ADMIN — Medication 600 MILLIGRAM(S): at 12:43

## 2022-02-01 RX ADMIN — HEPARIN SODIUM 2000 UNIT(S)/HR: 5000 INJECTION INTRAVENOUS; SUBCUTANEOUS at 16:38

## 2022-02-01 RX ADMIN — HEPARIN SODIUM 2000 UNIT(S)/HR: 5000 INJECTION INTRAVENOUS; SUBCUTANEOUS at 09:26

## 2022-02-01 RX ADMIN — Medication 600 MILLIGRAM(S): at 01:15

## 2022-02-01 RX ADMIN — LIDOCAINE 1 PATCH: 4 CREAM TOPICAL at 12:43

## 2022-02-01 RX ADMIN — CLOPIDOGREL BISULFATE 75 MILLIGRAM(S): 75 TABLET, FILM COATED ORAL at 12:43

## 2022-02-01 NOTE — PROGRESS NOTE ADULT - ATTENDING COMMENTS
No signs of significant ischemia or volume overload. dyspnea improving. Further cardiac workup will depend on clinical course.

## 2022-02-01 NOTE — PROGRESS NOTE ADULT - ASSESSMENT
50 yo female with PMHx of hypothyroidism, CAD/NSTEMI s/p POBA to OM1 followed by PCI c/w SCAD, former smoker admitted for R acute DVT at greater saphenous vein/common femoral vein junction.    remains with intermittent CP  cardio f/u  pain rx regimen  tolerating RA - VS noted  TTE repeat planned    DVT - PE - possible evolving Pulm Infarct - AC (on Heparin gtt) - I carlos alberto - monitor VS and Sat - RV strain eval - ProBNP and CE noted -   pericardial effusion - pericarditis eval - TTE - CT imaging - Cardio f/u - Hemodynamic monitoring -   Covid 19 - positive on JAN 13 - repeat Neg on Jan   CAD HF - cvs rx regimen - cardio f/u  Obesity - weight management - ANUJ eval as outpatient  Ex Smoker - PFT testing as outpatient - Pulm Eval as outpatient for COPD  CTAP: small hemorrhage in the extraperitoneal space of the right pelvis along the right pelvic sidewall without discrete collection - on Hep gtt - serial H and H, Surgery following -   ICU eval noted  monitor VS and HD and Sat

## 2022-02-01 NOTE — HISTORY OF PRESENT ILLNESS
[FreeTextEntry1] : I saw Melany Mckeon in the office today for cardiac follow-up.  She is a 51-year-old female who presented with a non-STEMI 1/14/2022.  Cardiac catheterization showed mild disease of the left main, LAD, and RCA.  The distal OM1 and OM2 100% blocked.  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 Echocardiogram showed mild segmental dysfunction with an EF of 50%.  There was akinetic basal to mid anterolateral wall, and basal to mid inferolateral wall.  No significant valvular heart disease.  Stage I diastolic dysfunction.

## 2022-02-01 NOTE — PROGRESS NOTE ADULT - SUBJECTIVE AND OBJECTIVE BOX
Date/Time Patient Seen:  		  Referring MD:   Data Reviewed	       Patient is a 51y old  Female who presents with a chief complaint of R above the knee DVT (31 Jan 2022 14:49)      Subjective/HPI     PAST MEDICAL & SURGICAL HISTORY:  Hypothyroidism    CAD (coronary artery disease)    NSTEMI (non-ST elevation myocardial infarction)    No significant past surgical history    S/P cardiac catheterization  &amp; balloon angioplasty with thrombotic lesion in OM1          Medication list         MEDICATIONS  (STANDING):  atorvastatin 10 milliGRAM(s) Oral at bedtime  clopidogrel Tablet 75 milliGRAM(s) Oral daily  heparin  Infusion.  Unit(s)/Hr (20 mL/Hr) IV Continuous <Continuous>  levothyroxine 150 MICROGram(s) Oral daily  lidocaine   4% Patch 1 Patch Transdermal daily  lisinopril 2.5 milliGRAM(s) Oral daily  metoprolol succinate ER 25 milliGRAM(s) Oral daily  pantoprazole    Tablet 40 milliGRAM(s) Oral before breakfast    MEDICATIONS  (PRN):  acetaminophen     Tablet .. 650 milliGRAM(s) Oral every 8 hours PRN Mild Pain (1 - 3)  heparin   Injectable 9000 Unit(s) IV Push every 6 hours PRN For aPTT less than 40  heparin   Injectable 4500 Unit(s) IV Push every 6 hours PRN For aPTT between 40 - 57  ondansetron Injectable 4 milliGRAM(s) IV Push every 6 hours PRN Nausea and/or Vomiting         Vitals log        ICU Vital Signs Last 24 Hrs  T(C): 36.3 (01 Feb 2022 05:34), Max: 37.8 (31 Jan 2022 17:20)  T(F): 97.4 (01 Feb 2022 05:34), Max: 100 (31 Jan 2022 17:20)  HR: 88 (01 Feb 2022 05:34) (88 - 99)  BP: 108/64 (01 Feb 2022 05:34) (108/64 - 129/71)  BP(mean): --  ABP: --  ABP(mean): --  RR: 18 (01 Feb 2022 05:34) (16 - 18)  SpO2: 95% (01 Feb 2022 05:34) (95% - 100%)           Input and Output:  I&O's Detail    31 Jan 2022 07:01  -  01 Feb 2022 06:21  --------------------------------------------------------  IN:  Total IN: 0 mL    OUT:    Voided (mL): 300 mL  Total OUT: 300 mL    Total NET: -300 mL          Lab Data                        9.2    16.21 )-----------( 526      ( 31 Jan 2022 22:29 )             28.8     01-31    134<L>  |  103  |  6<L>  ----------------------------<  111<H>  3.6   |  26  |  0.84    Ca    8.4<L>      31 Jan 2022 06:40  Phos  3.3     01-31  Mg     2.1     01-31    TPro  8.2  /  Alb  2.8<L>  /  TBili  1.2  /  DBili  x   /  AST  14<L>  /  ALT  38  /  AlkPhos  81  01-31      CARDIAC MARKERS ( 31 Jan 2022 17:09 )  x     / x     / 97 U/L / x     / 1.1 ng/mL  CARDIAC MARKERS ( 31 Jan 2022 12:47 )  x     / x     / 90 U/L / x     / <1.0 ng/mL  CARDIAC MARKERS ( 31 Jan 2022 09:37 )  x     / x     / 67 U/L / x     / <1.0 ng/mL  CARDIAC MARKERS ( 31 Jan 2022 06:40 )  x     / x     / 68 U/L / x     / <1.0 ng/mL        Review of Systems	      Objective     Physical Examination    heart s1s2  lung dec BS  abd soft  head nc  obese  cn grossly int      Pertinent Lab findings & Imaging      Kaitlynn:  NO   Adequate UO     I&O's Detail    31 Jan 2022 07:01  -  01 Feb 2022 06:21  --------------------------------------------------------  IN:  Total IN: 0 mL    OUT:    Voided (mL): 300 mL  Total OUT: 300 mL    Total NET: -300 mL               Discussed with:     Cultures:	        Radiology

## 2022-02-01 NOTE — PROGRESS NOTE ADULT - ASSESSMENT
52 yo female with PMHx of hypothyroidism, CAD/NSTEMI s/p POBA to OM1 followed by PCI c/w SCAD, former smoker admitted for R acute DVT at greater saphenous vein/common femoral vein junction and now b/l PE

## 2022-02-01 NOTE — PROGRESS NOTE ADULT - ATTENDING COMMENTS
52 yo female with PMHx of hypothyroidism, CAD/NSTEMI s/p POBA to OM1 followed by PCI c/w SCAD, former smoker admitted for R acute DVT at greater saphenous vein/common femoral vein junction and now b/l PE Plan: cont heparin gtt transition to doac tomorrow, trend h/h as per cardio, dc planning underway, apprec cardio recs, monitor clinical course

## 2022-02-01 NOTE — CHART NOTE - NSCHARTNOTEFT_GEN_A_CORE
Called by RN for chest pain. Pt seen and examined at bedside. Patient continues to have persistent chest pain unchanged form prior, has brief episodes of relief from Dilaudid. Patient states pain is 8/10 when laying down, moderate relief when sitting up. Pain does not radiate. Pain worsens with inspiration. No sob, cough, n/v/      Allergies    No Known Allergies    Intolerances        Vitals  Vital Signs Last 24 Hrs  T(C): 36.7 (2022 00:26), Max: 37.8 (2022 17:20)  T(F): 98 (2022 00:26), Max: 100 (2022 17:20)  HR: 99 (2022 00:) (90 - 99)  BP: 111/67 (2022 00:26) (106/70 - 129/71)  BP(mean): --  RR: 18 (2022 00:26) (16 - 18)  SpO2: 95% (2022 00:26) (95% - 100%)      General: moderate distress due to pain, sitting up in bed  Respiratory: CTA B/L, no wheezes, rales, rhonchi  CV: RRR, S1S2, no murmurs, rubs or gallops  Neurology: A&Ox3, nonfocal       LABS:                        9.2    16.21 )-----------( 526      ( 2022 22:29 )             28.8         134<L>  |  103  |  6<L>  ----------------------------<  111<H>  3.6   |  26  |  0.84    Ca    8.4<L>      2022 06:40  Phos  3.3       Mg     2.1         TPro  8.2  /  Alb  2.8<L>  /  TBili  1.2  /  DBili  x   /  AST  14<L>  /  ALT  38  /  AlkPhos  81      PT/INR - ( 2022 11:45 )   PT: 16.0 sec;   INR: 1.39 ratio         PTT - ( 2022 19:57 )  PTT:87.9 sec  Urinalysis Basic - ( 2022 13:44 )    Color: Yellow / Appearance: Slightly Turbid / S.010 / pH: x  Gluc: x / Ketone: Negative  / Bili: Negative / Urobili: 8   Blood: x / Protein: 30 mg/dL / Nitrite: Negative   Leuk Esterase: Trace / RBC: 6-10 /HPF / WBC 6-10   Sq Epi: x / Non Sq Epi: Occasional / Bacteria: Few            RADIOLOGY      A/P: 52 yo female with PMHx of hypothyroidism, CAD/NSTEMI s/p POBA to OM1 followed by PCI c/w SCAD, former smoker admitted for R acute DVT at greater saphenous vein/common femoral vein junction. Called by RN for chest pain.    - chest pain unchanged from what was described on admission  - pain is position dependent, with hx of prior MI likely to be from pericarditis  - started 600 mg ibuprofen q8 and protonix 40 mg daily  - consider starting colchicine for further pain management due to pericarditis  - monitor hemoglobin with daily CBC  - RN to call for any changes

## 2022-02-01 NOTE — DISCHARGE NOTE NURSING/CASE MANAGEMENT/SOCIAL WORK - PATIENT PORTAL LINK FT
You can access the FollowMyHealth Patient Portal offered by [facility] by registering at the following website: [email or web address]. By joining [employer]’s FollowMyHealth portal, you will also be able to view your health information using other applications (apps) compatible with our system.

## 2022-02-01 NOTE — PROGRESS NOTE ADULT - PROBLEM SELECTOR PLAN 5
Patient complaining of L shoulder pain from [facility] when she pulled on something  - X-ray L shoulder 1/13 w/ no acute findings   - Uses Tylenol and heating pad at home   - Will continue Tylenol PRN for mild pain and start on Lidocaine patch

## 2022-02-01 NOTE — PROGRESS NOTE ADULT - SUBJECTIVE AND OBJECTIVE BOX
[facility] -- Liz Dockery, Danielle, Albert, Kit Osorio Savella, Goodger: Office # [phone]    Follow Up:  PE/DVT, SCAD, POBA    Subjective/Observations:     REVIEW OF SYSTEMS: All review of systems is negative for eye, ENT, GI, , allergic, dermatologic, musculoskeletal and neurologic except as described above    PAST MEDICAL & SURGICAL HISTORY:  Hypothyroidism    CAD (coronary artery disease)    NSTEMI (non-ST elevation myocardial infarction)    S/P cardiac catheterization  &amp; balloon angioplasty with thrombotic lesion in OM1        MEDICATIONS  (STANDING):  atorvastatin 10 milliGRAM(s) Oral at bedtime  clopidogrel Tablet 75 milliGRAM(s) Oral daily  heparin  Infusion.  Unit(s)/Hr (20 mL/Hr) IV Continuous <Continuous>  levothyroxine 150 MICROGram(s) Oral daily  lidocaine   4% Patch 1 Patch Transdermal daily  lisinopril 2.5 milliGRAM(s) Oral daily  metoprolol succinate ER 25 milliGRAM(s) Oral daily  pantoprazole    Tablet 40 milliGRAM(s) Oral before breakfast    MEDICATIONS  (PRN):  acetaminophen     Tablet .. 650 milliGRAM(s) Oral every 8 hours PRN Mild Pain (1 - 3)  heparin   Injectable 9000 Unit(s) IV Push every 6 hours PRN For aPTT less than 40  heparin   Injectable 4500 Unit(s) IV Push every 6 hours PRN For aPTT between 40 - 57  ondansetron Injectable 4 milliGRAM(s) IV Push every 6 hours PRN Nausea and/or Vomiting    Allergies    No Known Allergies    Intolerances      Vital Signs Last 24 Hrs  T(C): 36.3 (2022 05:34), Max: 37.8 (2022 17:20)  T(F): 97.4 (2022 05:34), Max: 100 (2022 17:20)  HR: 88 (2022 05:34) (88 - 99)  BP: 108/64 (2022 05:34) (108/64 - 129/71)  BP(mean): --  RR: 18 (2022 05:34) (16 - 18)  SpO2: 95% (2022 05:34) (95% - 100%)  I&O's Summary    2022 07:01  -  2022 07:00  --------------------------------------------------------  IN: 240 mL / OUT: 300 mL / NET: -60 mL      Weight (kg): 95.3 ( @ 05:34)    TELE:  87  PHYSICAL EXAM:  Appearance: NAD, no distress, alert, morbidly obese  HEENT: Moist Mucous Membranes, Anicteric  Cardiovascular: Regular rate and rhythm, Normal S1 S2, No JVD, No murmurs, No rubs, gallops or clicks  Respiratory: Non-labored, Clear to auscultation, No rales, No rhonchi, No wheezing.   Gastrointestinal:  Soft, Non-tender, + BS  Neurologic: Non-focal  Skin: Warm and dry, No visible rashes or ulcers, + Right thigh ecchymosis No cyanosis  Musculoskeletal: No clubbing, No cyanosis, No joint swelling/tenderness  Psychiatry: Mood & affect appropriate  Lymph: No peripheral edema.     LABS: All Labs Reviewed:                        9.2    16.21 )-----------( 526      ( 2022 22:29 )             28.8                         9.3    15.38 )-----------( 512      ( 2022 17:09 )             29.0                         9.3    16.26 )-----------( 505      ( 2022 12:47 )             29.1     2022 06:40    134    |  103    |  6      ----------------------------<  111    3.6     |  26     |  0.84   2022 11:50    138    |  107    |  6      ----------------------------<  95     3.8     |  27     |  0.90     Ca    8.4        2022 06:40  Ca    8.5        2022 11:50  Phos  3.3       2022 06:40  Mg     2.1       2022 06:40    TPro  8.2    /  Alb  2.8    /  TBili  1.2    /  DBili  x      /  AST  14     /  ALT  38     /  AlkPhos  81     2022 06:40    PT/INR - ( 2022 11:45 )   PT: 16.0 sec;   INR: 1.39 ratio         PTT - ( 2022 19:57 )  PTT:87.9 sec  Creatine Kinase, Serum: 97 U/L (22 @ 17:09)  Troponin I, High Sensitivity Result: 733.3 ng/L (22 @ 17:09)  Creatine Kinase, Serum: 90 U/L (22 @ 12:47)  Troponin I, High Sensitivity Result: 827.3 ng/L (22 @ 12:47)  Creatine Kinase, Serum: 67 U/L (22 @ 09:37)      D-Dimer Assay, Quantitative: 1035 ng/mL DDU (22 @ 09:37)        Cholesterol, Serum: 159 mg/dL (22 @ 09:20)  HDL Cholesterol, Serum: 58 mg/dL (22 @ 09:20)  Triglycerides, Serum: 61 mg/dL (22 @ 09:20)      12 Lead ECG:   Ventricular Rate 94 BPM    Atrial Rate 94 BPM    P-R Interval 142 ms    QRS Duration 74 ms    Q-T Interval 358 ms    QTC Calculation(Bazett) 447 ms    P Axis 49 degrees    R Axis -21 degrees    T Axis 0 degrees    Diagnosis Line Normal sinus rhythm  Possible Left atrial enlargement  Low voltage QRS  Nonspecific T wave abnormality  Confirmed by ENEIDA OSORIO ([id number]) on 2022 12:34:02 PM (22 @ 06:24)    < from: US Duplex Venous Lower Ext Complete, Bilateral (22 @ 17:42) >  ACC: [id number] EXAM:  US DPLX LWR EXT VEINS COMPL BI                        PROCEDURE DATE:  2022    INTERPRETATION:  CLINICAL INFORMATION: Status post cardiac catheter with   concern for greater saphenous vein clot at recent CT.  COMPARISON: CT 2022  TECHNIQUE: Duplex sonography of the BILATERAL LOWER extremity veins with   color and spectral Doppler, with and without compression.  FINDINGS:    RIGHT:  Acute thrombus is present within the greater saphenous vein extending to   the common femoral vein junction. Normal compressibility of the femoral   and popliteal veins.  Doppler examination shows normal spontaneous and phasic flow.  No RIGHT calf vein thrombosis is detected.    LEFT:  Normal compressibility of the LEFT common femoral, femoral and popliteal   veins.  Doppler examination shows normal spontaneous and phasic flow.  No LEFT calf vein thrombosis is detected.    IMPRESSION:  Acute deep venous thrombosis at the right greater saphenous vein/common   femoral vein junction.  Acute deep venous thrombosis: above the knee.    Findings were discussed with Dr. NEVA SHIPLEY  2022 5:55 PM by Dr. Price with read back confirmation.  --- End of Report ---  < end of copied text >    < from: CT Abdomen and Pelvis w/ IV Cont (22 @ 15:45) >  ACC: [id number] EXAM:  CT ABDOMEN AND PELVIS IC                        PROCEDURE DATE:  2022    INTERPRETATION:  CLINICAL INFORMATION: Status post cardiac catheter with   right groin hematoma. Evaluate for retroperitoneal bleed.  COMPARISON: None.  CONTRAST/COMPLICATIONS:  IV Contrast: Omnipaque 350  90 cc administered   10 cc discarded  Oral Contrast: NONE  Complications: None reported at time of study completion    PROCEDURE:  CT of the Abdomen and Pelvis was performed.  Sagittal and coronal reformats were performed.    FINDINGS:  LOWER CHEST: Small pericardial effusion with questionable enhancing   pericardium.  LIVER: A 1.6 cm indeterminate hypodense lesion in the left hepatic lobe.  BILE DUCTS: Normal caliber.  GALLBLADDER: Within normal limits.  SPLEEN: Within normal limits.  PANCREAS: Within normal limits.  ADRENALS: Within normal limits.  KIDNEYS/URETERS: Left renal cortical scarring.  BLADDER: Within normal limits.  REPRODUCTIVE ORGANS: Enlarged fibroid uterus. No adnexal mass.  BOWEL: No bowel obstruction.  PERITONEUM: No ascites.  VESSELS: Right greater saphenous vein is expanded and hypodense.   Heterogeneity of the right external iliac vein.  RETROPERITONEUM/LYMPH NODES: No lymphadenopathy. Small hemorrhage in the   extraperitoneal space of the right pelvis along the right pelvic side   wall. No discrete collection.  ABDOMINAL WALL: No right groin collection.  BONES: Degenerative changes.    IMPRESSION:  Small hemorrhage in the extraperitoneal space of the right pelvis along   the right pelvic sidewall without discrete collection.    Expanded right greater saphenous vein and heterogeneity of the right   external iliac vein raising concern for thrombosis. Correlation with   right lower extremity venous Doppler is recommended with specific   attention to these vessels.    Small pericardial effusion with concern for mild pericardial enhancement   which may be infectious or inflammatory.  A 1.6 cm indeterminate left hepatic lobe lesion.  --- End of Report ---  < end of copied text >    < from: Cardiac Catheterization (22 @ 13:24) >  Study Date:     2022   Name:           YU RED   :            1970   (51 years)   Gender:         female   MR#:            [id number]   [facility]#:           [id number]   Patient Class:  Inpatient     Cath Lab Report    Diagnostic Cardiologist:       Srinivas Lamb MD   Fellow:                        Lamin Crain MD   Fellow:                        Renato Flor MD   Monitor:                       EARLENE CarpenterT   Scrub:                         EARLENE GarciaT   Nurse:                        René Rodríguez   Referring Physician:           Neva Santos MD     Procedures Performed   Procedures:               1.    Left Coronary Angio     2.    Sonosite - Diagnostic   3.    Art Access - L femoral artery*   4.    Hemostasis with Angioseal     Indications:               Myocardial infarction without ST elevation  (NSTEMI)    Diagnostic Conclusions:     It appears the distal OM lesions are most likely secondary to SCAD  given the normal angiographic appearance of the rest of the  vessel     Procedure Narrative:   The risks and alternatives of the procedures and conscious sedation  were explained to the patient and informed consent was  obtained. The patient was brought to the cath lab and placed on the  exam table.  Access   Left femoral artery:   The puncture site was infiltrated with 1% Lidocaine. Vascular access  was obtained using modified seldinger technique.    Diagnostic Findings:     Coronary Angiography   LM   Left main artery: Angiography shows mild atherosclerosis.      LAD   Left anterior descending artery: Angiography shows mild  atherosclerosis.    CX   First obtuse marginal: There is a 100 % stenosis. Second obtuse  marginal: There is a 100 % stenosis.    RCA   Right coronary artery: Angiography shows mild atherosclerosis.      Patient: YU RED          MRN: [id number]  Study Date: 2022   01:24 PM      Page 1 of 3    History and Risk Factors:   Hypertension:                             Yes   Dyslipidemia:                          Yes   Prior MI:                                 No   Prior PCI:                                Yes   Family History of Premature CAD:          No   Cerebrovascular Disease:                  No   Peripheral Arterial Disease:            No   Cardiac Arrest Out of Hospital:           No   Prior Heart Failure:                      No   Diabetes:                                 No   Exam Start Time:   01:24 PM   Exam End Time:     01:36 PM   Exam Duration:     12 min     Contrast:   Description                    Dose         Unit       Serial No.   Omnipaque                         27.000   mL     Medication:   Description                 Dose, Unit, Route, Time   midazolam  1 mG/mL          1.0, mg, IV, 13:24:44   Injectable (versed)   fentaNYL 0.05 mG/mL         25.0, mcg, IV, 13:24:49   Injectable  (Fentanyl)     Inventory:   Description                  Quantity     Diana#        Reference  No.    Serial No.      Lot No.       CDM  CATH PACK 1.000        [id number]   [id number]  [id number]    030   [facility] 2 /    1.000        [id number]   [id number]   4                                      604   HEPARIN SALINE 1000 USP   1.000        [id number]   -69  [id number]  Units/500 mL                           397   HEPARIN SALINE 1000 USP   1.000        [id number]   -49  [id number]  Units/500 mL                           397   OMNIPAQUE 350 150ML       1.000        [id number]   [id number]     368   .035 X 150 GUIDERIGHT     1.000        [id number]   [id number]  [id number]    594   6FR SHEATH PINNACLE 1.000              [id number]   [id number]  [id number]    644   6FR EBU 3.0 LAUNCHER      1.000        [id number]   [id number]  [id number]    971   6FR VIP ANGIOSEAL         1.000        [id number]   [id number]  [id number]    975   Hemodynamic Pressures:   Phase          Location           [mmHg]               [mmHg]  [mmHg]           HR    Patient: YU RED          MRN: [id number]  Study Date: 2022   01:24 PM      Page 2 of 3    Baseline       Ao                     s       111                 d  71                 m       87          94    Conscious sedation was administered and monitored by Cardiology  nursing staff,  under my direct supervision when applicable.     Electronically signed by Srinivas Lamb MD on 2022 at 03:03 PM     Patient: YU RED          MRN: [id number]  Study Date: 2022   01:24 PM      Page 3 of 3    < end of copied text >      < from: CT Angio Chest PE Protocol w/ IV Cont (22 @ 10:43) >  IMPRESSION:  Bilateral pulmonary emboli involving segmental right lower lobe pulmonary   arterial branches, left lingula branch pulmonary artery and segmental   left lower lobe pulmonary arterial branches as discussed.  No CT evidence for right-sided heart strain.  Patchy atelectatic changes lung bases, cannot exclude developing   pulmonary infarct.  Small left-sided pleural effusion.  Small to moderate pericardial effusion, which is increased in size from   CT scan of the abdomen and pelvis performed on 2022.  Other findings as discussed above.  --- End of Report ---  ***Please see the addendum at the top of this report. It may contain   additional important information or changes.****  < end of copied text >     [facility] -- Liz Dockery Grossman, Wach[place]man, Kit Osorio Savella, Goodger: Office # [phone]    Follow Up:  PE/DVT, SCAD, POBA    Subjective/Observations: Patient seen and examined. Events noted. Resting comfortably in bed. No complaints of chest pain,  or palpitations reported. No signs of orthopnea or PND. dyspnea improved     REVIEW OF SYSTEMS: All review of systems is negative for eye, ENT, GI, , allergic, dermatologic, musculoskeletal and neurologic except as described above    PAST MEDICAL & SURGICAL HISTORY:  Hypothyroidism    CAD (coronary artery disease)    NSTEMI (non-ST elevation myocardial infarction)    S/P cardiac catheterization  &amp; balloon angioplasty with thrombotic lesion in OM1        MEDICATIONS  (STANDING):  atorvastatin 10 milliGRAM(s) Oral at bedtime  clopidogrel Tablet 75 milliGRAM(s) Oral daily  heparin  Infusion.  Unit(s)/Hr (20 mL/Hr) IV Continuous <Continuous>  levothyroxine 150 MICROGram(s) Oral daily  lidocaine   4% Patch 1 Patch Transdermal daily  lisinopril 2.5 milliGRAM(s) Oral daily  metoprolol succinate ER 25 milliGRAM(s) Oral daily  pantoprazole    Tablet 40 milliGRAM(s) Oral before breakfast    MEDICATIONS  (PRN):  acetaminophen     Tablet .. 650 milliGRAM(s) Oral every 8 hours PRN Mild Pain (1 - 3)  heparin   Injectable 9000 Unit(s) IV Push every 6 hours PRN For aPTT less than 40  heparin   Injectable 4500 Unit(s) IV Push every 6 hours PRN For aPTT between 40 - 57  ondansetron Injectable 4 milliGRAM(s) IV Push every 6 hours PRN Nausea and/or Vomiting    Allergies    No Known Allergies    Intolerances      Vital Signs Last 24 Hrs  T(C): 36.3 (2022 05:34), Max: 37.8 (2022 17:20)  T(F): 97.4 (2022 05:34), Max: 100 (2022 17:20)  HR: 88 (2022 05:34) (88 - 99)  BP: 108/64 (2022 05:34) (108/64 - 129/71)  BP(mean): --  RR: 18 (2022 05:34) (16 - 18)  SpO2: 95% (2022 05:34) (95% - 100%)  I&O's Summary    2022 07:01  -  2022 07:00  --------------------------------------------------------  IN: 240 mL / OUT: 300 mL / NET: -60 mL      Weight (kg): 95.3 ( @ 05:34)    TELE: SR 87  PHYSICAL EXAM:  Appearance: NAD, no distress, alert, morbidly obese  HEENT: Moist Mucous Membranes, Anicteric  Cardiovascular: Regular rate and rhythm, Normal S1 S2, No JVD, No murmurs, No rubs, gallops or clicks  Respiratory: Non-labored, Clear to auscultation, No rales, No rhonchi, No wheezing.   Gastrointestinal:  Soft, Non-tender, + BS  Neurologic: Non-focal  Skin: Warm and dry, No visible rashes or ulcers, + Right thigh ecchymosis No cyanosis  Musculoskeletal: No clubbing, No cyanosis, No joint swelling/tenderness  Psychiatry: Mood & affect appropriate  Lymph: No peripheral edema.     LABS: All Labs Reviewed:                        9.2    16.21 )-----------( 526      ( 2022 22:29 )             28.8                         9.3    15.38 )-----------( 512      ( 2022 17:09 )             29.0                         9.3    16.26 )-----------( 505      ( 2022 12:47 )             29.1     2022 06:40    134    |  103    |  6      ----------------------------<  111    3.6     |  26     |  0.84   2022 11:50    138    |  107    |  6      ----------------------------<  95     3.8     |  27     |  0.90     Ca    8.4        2022 06:40  Ca    8.5        2022 11:50  Phos  3.3       2022 06:40  Mg     2.1       2022 06:40    TPro  8.2    /  Alb  2.8    /  TBili  1.2    /  DBili  x      /  AST  14     /  ALT  38     /  AlkPhos  81     2022 06:40    PT/INR - ( 2022 11:45 )   PT: 16.0 sec;   INR: 1.39 ratio         PTT - ( 2022 19:57 )  PTT:87.9 sec  Creatine Kinase, Serum: 97 U/L (22 @ 17:09)  Troponin I, High Sensitivity Result: 733.3 ng/L (22 @ 17:09)  Creatine Kinase, Serum: 90 U/L (22 @ 12:47)  Troponin I, High Sensitivity Result: 827.3 ng/L (22 @ 12:47)  Creatine Kinase, Serum: 67 U/L (22 @ 09:37)      D-Dimer Assay, Quantitative: 1035 ng/mL DDU (22 @ 09:37)        Cholesterol, Serum: 159 mg/dL (22 @ 09:20)  HDL Cholesterol, Serum: 58 mg/dL (22 @ 09:20)  Triglycerides, Serum: 61 mg/dL (22 @ 09:20)      12 Lead ECG:   Ventricular Rate 94 BPM    Atrial Rate 94 BPM    P-R Interval 142 ms    QRS Duration 74 ms    Q-T Interval 358 ms    QTC Calculation(Bazett) 447 ms    P Axis 49 degrees    R Axis -21 degrees    T Axis 0 degrees    Diagnosis Line Normal sinus rhythm  Possible Left atrial enlargement  Low voltage QRS  Nonspecific T wave abnormality  Confirmed by ENEIDA OSORIO ([id number]) on 2022 12:34:02 PM (22 @ 06:24)    < from: US Duplex Venous Lower Ext Complete, Bilateral (22 @ 17:42) >  ACC: [id number] EXAM:  US DPLX LWR EXT VEINS COMPL BI                        PROCEDURE DATE:  2022    INTERPRETATION:  CLINICAL INFORMATION: Status post cardiac catheter with   concern for greater saphenous vein clot at recent CT.  COMPARISON: CT 2022  TECHNIQUE: Duplex sonography of the BILATERAL LOWER extremity veins with   color and spectral Doppler, with and without compression.  FINDINGS:    RIGHT:  Acute thrombus is present within the greater saphenous vein extending to   the common femoral vein junction. Normal compressibility of the femoral   and popliteal veins.  Doppler examination shows normal spontaneous and phasic flow.  No RIGHT calf vein thrombosis is detected.    LEFT:  Normal compressibility of the LEFT common femoral, femoral and popliteal   veins.  Doppler examination shows normal spontaneous and phasic flow.  No LEFT calf vein thrombosis is detected.    IMPRESSION:  Acute deep venous thrombosis at the right greater saphenous vein/common   femoral vein junction.  Acute deep venous thrombosis: above the knee.    Findings were discussed with Dr. NEVA SHIPLEY  2022 5:55 PM by Dr. Price with read back confirmation.  --- End of Report ---  < end of copied text >    < from: CT Abdomen and Pelvis w/ IV Cont (22 @ 15:45) >  ACC: [id number] EXAM:  CT ABDOMEN AND PELVIS IC                        PROCEDURE DATE:  2022    INTERPRETATION:  CLINICAL INFORMATION: Status post cardiac catheter with   right groin hematoma. Evaluate for retroperitoneal bleed.  COMPARISON: None.  CONTRAST/COMPLICATIONS:  IV Contrast: Omnipaque 350  90 cc administered   10 cc discarded  Oral Contrast: NONE  Complications: None reported at time of study completion    PROCEDURE:  CT of the Abdomen and Pelvis was performed.  Sagittal and coronal reformats were performed.    FINDINGS:  LOWER CHEST: Small pericardial effusion with questionable enhancing   pericardium.  LIVER: A 1.6 cm indeterminate hypodense lesion in the left hepatic lobe.  BILE DUCTS: Normal caliber.  GALLBLADDER: Within normal limits.  SPLEEN: Within normal limits.  PANCREAS: Within normal limits.  ADRENALS: Within normal limits.  KIDNEYS/URETERS: Left renal cortical scarring.  BLADDER: Within normal limits.  REPRODUCTIVE ORGANS: Enlarged fibroid uterus. No adnexal mass.  BOWEL: No bowel obstruction.  PERITONEUM: No ascites.  VESSELS: Right greater saphenous vein is expanded and hypodense.   Heterogeneity of the right external iliac vein.  RETROPERITONEUM/LYMPH NODES: No lymphadenopathy. Small hemorrhage in the   extraperitoneal space of the right pelvis along the right pelvic side   wall. No discrete collection.  ABDOMINAL WALL: No right groin collection.  BONES: Degenerative changes.    IMPRESSION:  Small hemorrhage in the extraperitoneal space of the right pelvis along   the right pelvic sidewall without discrete collection.    Expanded right greater saphenous vein and heterogeneity of the right   external iliac vein raising concern for thrombosis. Correlation with   right lower extremity venous Doppler is recommended with specific   attention to these vessels.    Small pericardial effusion with concern for mild pericardial enhancement   which may be infectious or inflammatory.  A 1.6 cm indeterminate left hepatic lobe lesion.  --- End of Report ---  < end of copied text >    < from: Cardiac Catheterization (22 @ 13:24) >  Study Date:     2022   Name:           YU RED   :            1970   (51 years)   Gender:         female   MR#:            [id number]   [facility]#:           [id number]   Patient Class:  Inpatient     Cath Lab Report    Diagnostic Cardiologist:       Srinivas Lamb MD   Fellow:                        Lamin Crain MD   Fellow:                        Renato Flor MD   Monitor:                       FREDERIC Carpenter   Scrub:                         EARLENE GarciaT   Nurse:                        René Rodríguez   Referring Physician:           Neva Santos MD     Procedures Performed   Procedures:               1.    Left Coronary Angio     2.    Sonosite - Diagnostic   3.    Art Access - L femoral artery*   4.    Hemostasis with Angioseal     Indications:               Myocardial infarction without ST elevation  (NSTEMI)    Diagnostic Conclusions:     It appears the distal OM lesions are most likely secondary to SCAD  given the normal angiographic appearance of the rest of the  vessel     Procedure Narrative:   The risks and alternatives of the procedures and conscious sedation  were explained to the patient and informed consent was  obtained. The patient was brought to the cath lab and placed on the  exam table.  Access   Left femoral artery:   The puncture site was infiltrated with 1% Lidocaine. Vascular access  was obtained using modified seldinger technique.    Diagnostic Findings:     Coronary Angiography   LM   Left main artery: Angiography shows mild atherosclerosis.      LAD   Left anterior descending artery: Angiography shows mild  atherosclerosis.    CX   First obtuse marginal: There is a 100 % stenosis. Second obtuse  marginal: There is a 100 % stenosis.    RCA   Right coronary artery: Angiography shows mild atherosclerosis.      Patient: YU RED          MRN: [id number]  Study Date: 2022   01:24 PM      Page 1 of 3    History and Risk Factors:   Hypertension:                             Yes   Dyslipidemia:                          Yes   Prior MI:                                 No   Prior PCI:                                Yes   Family History of Premature CAD:          No   Cerebrovascular Disease:                  No   Peripheral Arterial Disease:            No   Cardiac Arrest Out of Hospital:           No   Prior Heart Failure:                      No   Diabetes:                                 No   Exam Start Time:   01:24 PM   Exam End Time:     01:36 PM   Exam Duration:     12 min     Contrast:   Description                    Dose         Unit       Serial No.   Omnipaque                         27.000   mL     Medication:   Description                 Dose, Unit, Route, Time   midazolam  1 mG/mL          1.0, mg, IV, 13:24:44   Injectable (versed)   fentaNYL 0.05 mG/mL         25.0, mcg, IV, 13:24:49   Injectable  (Fentanyl)     Inventory:   Description                  Quantity     [employer]#        Reference  No.    Serial No.      Lot No.       CDM  CATH PACK 1.000        [id number]   [id number]  [id number]    030   [employer] [facility] 2 /    1.000        [id number]   [id number]   4                                      604   HEPARIN SALINE 1000 USP   1.000        [id number]   -18  [id number]  Units/500 mL                           397   HEPARIN SALINE 1000 USP   1.000        [id number]   -22  [id number]  Units/500 mL                           397   OMNIPAQUE 350 150ML       1.000        [id number]   [id number]     368   .035 X 150 GUIDERIGHT     1.000        [id number]   [id number]  [id number]    594   6FR SHEATH PINNACLE 1.000              [id number]   [id number]  [id number]    644   6FR EBU 3.0 LAUNCHER      1.000        [id number]   [id number]  [id number]    971   6FR VIP ANGIOSEAL         1.000        [id number]   [id number]  [id number]    975   Hemodynamic Pressures:   Phase          Location           [mmHg]               [mmHg]  [mmHg]           HR    Patient: YU RED          MRN: [id number]  Study Date: 2022   01:24 PM      Page 2 of 3    Baseline       Ao                     s       111                 d  71                 m       87          94    Conscious sedation was administered and monitored by Cardiology  nursing staff,  under my direct supervision when applicable.     Electronically signed by Srinivas Lamb MD on 2022 at 03:03 PM     Patient: YU RED          MRN: [id number]  Study Date: 2022   01:24 PM      Page 3 of 3    < end of copied text >      < from: CT Angio Chest PE Protocol w/ IV Cont (22 @ 10:43) >  IMPRESSION:  Bilateral pulmonary emboli involving segmental right lower lobe pulmonary   arterial branches, left lingula branch pulmonary artery and segmental   left lower lobe pulmonary arterial branches as discussed.  No CT evidence for right-sided heart strain.  Patchy atelectatic changes lung bases, cannot exclude developing   pulmonary infarct.  Small left-sided pleural effusion.  Small to moderate pericardial effusion, which is increased in size from   CT scan of the abdomen and pelvis performed on 2022.  Other findings as discussed above.  --- End of Report ---  ***Please see the addendum at the top of this report. It may contain   additional important information or changes.****  < end of copied text >

## 2022-02-01 NOTE — PROGRESS NOTE ADULT - SUBJECTIVE AND OBJECTIVE BOX
Patient is a 51y old  Female who presents with a chief complaint of R above the knee DVT (01 Feb 2022 09:07)      INTERVAL HPI/OVERNIGHT EVENTS: Patient seen and examined at bedside. Received motrin and protonix overnight for chest discomfort. Patient has no complaints at this time and says her chest pain is improved. Denies fevers, chills, headache, chest pain, dyspnea, abdominal pain, n/v/d/c.    MEDICATIONS  (STANDING):  atorvastatin 10 milliGRAM(s) Oral at bedtime  clopidogrel Tablet 75 milliGRAM(s) Oral daily  heparin  Infusion.  Unit(s)/Hr (20 mL/Hr) IV Continuous <Continuous>  levothyroxine 150 MICROGram(s) Oral daily  lidocaine   4% Patch 1 Patch Transdermal daily  lisinopril 2.5 milliGRAM(s) Oral daily  metoprolol succinate ER 25 milliGRAM(s) Oral daily  pantoprazole    Tablet 40 milliGRAM(s) Oral before breakfast    MEDICATIONS  (PRN):  acetaminophen     Tablet .. 650 milliGRAM(s) Oral every 8 hours PRN Mild Pain (1 - 3)  heparin   Injectable 9000 Unit(s) IV Push every 6 hours PRN For aPTT less than 40  heparin   Injectable 4500 Unit(s) IV Push every 6 hours PRN For aPTT between 40 - 57  ibuprofen  Tablet. 600 milliGRAM(s) Oral every 8 hours PRN Severe Pain (7 - 10)  ondansetron Injectable 4 milliGRAM(s) IV Push every 6 hours PRN Nausea and/or Vomiting      Allergies    No Known Allergies    Intolerances        REVIEW OF SYSTEMS:  CONSTITUTIONAL: No fever or chills  HEENT:  No headache  RESPIRATORY: No shortness of breath  CARDIOVASCULAR: No chest pain  GASTROINTESTINAL: No abd pain, nausea, vomiting, or diarrhea  GENITOURINARY: No dysuria, frequency, or hematuria  NEUROLOGICAL: no focal weakness or dizziness  MUSCULOSKELETAL: no new myalgias     Vital Signs Last 24 Hrs  T(C): 36.7 (01 Feb 2022 11:40), Max: 37.8 (31 Jan 2022 17:20)  T(F): 98 (01 Feb 2022 11:40), Max: 100 (31 Jan 2022 17:20)  HR: 92 (01 Feb 2022 11:40) (88 - 99)  BP: 119/81 (01 Feb 2022 11:40) (108/64 - 129/71)  BP(mean): --  RR: 18 (01 Feb 2022 11:40) (18 - 18)  SpO2: 93% (01 Feb 2022 11:40) (93% - 100%)    PHYSICAL EXAM:  GENERAL: NAD, comfortable appearing  HEENT:  anicteric, moist mucous membranes  CHEST/LUNG:  b/l breath sounds diminished but clear, no rales, wheezes, or rhonchi  HEART:  tachycardic, S1, S2, no murmurs, rubs, or gallops  ABDOMEN:  Soft, nontender, nondistended  EXTREMITIES: no pedal edema, cyanosis, or calf tenderness  NERVOUS SYSTEM: answers questions and follows commands appropriately    LABS:                        8.5    11.66 )-----------( 487      ( 01 Feb 2022 14:37 )             26.4     CBC Full  -  ( 01 Feb 2022 14:37 )  WBC Count : 11.66 K/uL  Hemoglobin : 8.5 g/dL  Hematocrit : 26.4 %  Platelet Count - Automated : 487 K/uL  Mean Cell Volume : 84.1 fl  Mean Cell Hemoglobin : 27.1 pg  Mean Cell Hemoglobin Concentration : 32.2 gm/dL  Auto Neutrophil # : x  Auto Lymphocyte # : x  Auto Monocyte # : x  Auto Eosinophil # : x  Auto Basophil # : x  Auto Neutrophil % : x  Auto Lymphocyte % : x  Auto Monocyte % : x  Auto Eosinophil % : x  Auto Basophil % : x    01 Feb 2022 09:03    136    |  103    |  6      ----------------------------<  102    3.4     |  26     |  0.80     Ca    8.5        01 Feb 2022 09:03  Phos  3.1       01 Feb 2022 09:03  Mg     2.3       01 Feb 2022 09:03    TPro  8.0    /  Alb  2.6    /  TBili  1.0    /  DBili  x      /  AST  11     /  ALT  24     /  AlkPhos  78     01 Feb 2022 09:03    PTT - ( 01 Feb 2022 09:03 )  PTT:88.3 sec    CAPILLARY BLOOD GLUCOSE              RADIOLOGY & ADDITIONAL TESTS:    < from: TTE Echo Complete w/o Contrast w/ Doppler (01.31.22 @ 14:53) >     EXAM:  ECHO TTE WO CON COMP W DOPP         PROCEDURE DATE:  01/31/2022        INTERPRETATION:  INDICATION: Pulmonary embolism  Sonographer KL    Blood Pressure 109/74    Height 167.6 cm     Weight 113.4 kg       BSA   2.2 sq m    Dimensions:  LA 3.3       Normal Values: 2.0 - 4.0 cm  Ao 2.4        Normal Values: 2.0 - 3.8 cm  SEPTUM 1.1       Normal Values: 0.6 - 1.2 cm  PWT 1.0       Normal Values: 0.6 - 1.1 cm  LVIDd 5.4         Normal Values: 3.0 - 5.6 cm  LVIDs 4.3         Normal Values: 1.8 - 4.0 cm      OBSERVATIONS:  Technically difficult and limited study, patient sitting upright in chair  Mitral Valve: normal, trace physiologic MR.  Aortic Valve/Aorta: normal trileaflet aortic valve.  Tricuspid Valve: normal with trace TR.  Pulmonic Valve: Not well-visualized  Left Atrium: normal  Right Atrium: Not well-visualized  Left Ventricle: Mild left ventricular systolic dysfunction, estimated   LVEF of 45%. Left ventricular endocardium is not well-visualized, in   certain views the inferior and inferolateral walls appear hypokinetic  Right Ventricle: Not well-visualized  Pericardium: Small pericardial effusion.        IMPRESSION:  Technically difficult and limited study  Mild left ventricular systolic dysfunction, estimated LVEF of45%. Left   ventricular endocardium is not well-visualized, in certain views the   inferior and inferolateral walls appear hypokinetic  The right ventricle is not well-visualized  Normal trileaflet aortic valve, without AI.  Trace physiologic MR and TR.  Small pericardial effusion.    --- End of Report ---              EDGARD CHRISTIANSON MD; Attending Cardiologist  This document has been electronically signed. Feb 1 2022  2:11PM    < end of copied text >      Consultant(s) Notes Reviewed:  [x] YES  [ ] NO

## 2022-02-01 NOTE — CARDIOLOGY SUMMARY
[de-identified] : 1/14/22- EF 50%, akinetic basal and mid anterolateral wall and basal to mid inferolateral wall. I DD [de-identified] : 1/14/22-  LM-mild, LAD-mild, OM1 and OM2- 100%, RCA-mild

## 2022-02-01 NOTE — PROGRESS NOTE ADULT - PROBLEM SELECTOR PLAN 2
- pt with pleuritic chest pain that improves with upright positioning, history of MI earlier this month, possible post-MI pericarditis  - cardiology following  - pericardial effusion seen on CTA - growing in size  - TTE 1/31 with small pericardial effusion - f/u repeat in AM  - trend CBC q6h  - pain control prn - motrin and tylenol prns available

## 2022-02-01 NOTE — PROGRESS NOTE ADULT - ASSESSMENT
51 F with CAD s/p recent stent to OM1 c/w SCAD, and hypothyroidism p/w right upper thigh ecchymosis that she noticed 2 days ago.  Denies dizziness or lightheadedness.  Denies numbness or tingling of RLE. Patient had recently had a cardiac cath on 1/13 and 14 using an angioseal with no complication on exit site of catheter.      CAD S/P recent OM1 angioplasty/DVT  - S/P 1/13 NSTEMI with thrombotic lesion in OM1 S/P POBA p/w right thigh ecchymosis   - 1/30 LE Doppler Acute Right DVT, heparin gtt initiated  - CTAP: small hemorrhage in the extraperitoneal space of the right pelvis along the right pelvic sidewall without discrete collection, and small pericardial effusion surgery following ok with heparin gtt, no bolus  - H/H with small drop   9.1/28.9 --> 8.5/27.2 follow closely     - 1/31 in the AM patient reporting chest pain, worse with inspiration and when laying flat   - D-Dimer 1035, CTA done revealing b/l PEs, small to moderate pericardial effusion without evidence of R heart strain  - Continue Heparin gtt IV for AC  - Troponin elevated and was trended until peak, unlikely plaque rupture but rather demand ischemia in setting of PE   - EKG SR 94 low voltage, no acute ischemia   - CP seems related to post MI pericarditis given effusion, and pain was relieved with ibuprofen   - Consider colchicine if needed for further pain  - Given that she had SCAD POBA without PCI, and needs FD AC, we do not need triple therapy  - Continue Plavix ansd D/C ASA  - Continue BB, statin  - BP stable 108/64  continue Lisinopril    - Monitor and replete lytes, keep K>4, Mg>2.  - Will continue to follow.    Rose Marie Abel, MS ANP, AGACNP  Nurse Practitioner- Cardiology   Spectra #[phone]     51 F with CAD s/p recent stent to OM1 c/w SCAD, and hypothyroidism p/w right upper thigh ecchymosis that she noticed 2 days ago.  Denies dizziness or lightheadedness.  Denies numbness or tingling of RLE. Patient had recently had a cardiac cath on 1/13 and 14 using an angioseal with no complication on exit site of catheter.      CAD S/P recent OM1 angioplasty/DVT  - S/P 1/13 NSTEMI with thrombotic lesion in OM1 S/P POBA p/w right thigh ecchymosis   - 1/30 LE Doppler Acute Right DVT, heparin gtt initiated  - CTAP: small hemorrhage in the extraperitoneal space of the right pelvis along the right pelvic sidewall without discrete collection, and small pericardial effusion surgery following ok with heparin gtt, no bolus  - H/H stable.     - 1/31 in the AM patient reporting chest pain, worse with inspiration and when laying flat   - D-Dimer 1035, CTA done revealing b/l PEs, small to moderate pericardial effusion without evidence of R heart strain  - Continue Heparin gtt IV for AC  - Troponin elevated and was trended until peak, unlikely plaque rupture but rather demand ischemia in setting of PE   - EKG SR 94 low voltage, no acute ischemia   - CP seems related to post MI pericarditis given effusion, and pain was relieved with ibuprofen   - Consider colchicine if needed for further pain  - Given that she had SCAD POBA without PCI, and needs FD AC, we do not need triple therapy  - Continue Plavix and D/C ASA  - Continue BB, statin  - BP stable 108/64  continue Lisinopril    - Monitor and replete lytes, keep K>4, Mg>2.  - Will continue to follow.    Rose Marie Abel, MS ANP, AGACNP  Nurse Practitioner- Cardiology   Spectra #[phone]

## 2022-02-01 NOTE — DISCHARGE NOTE NURSING/CASE MANAGEMENT/SOCIAL WORK - NSDCPEFALRISK_GEN_ALL_CORE
For information on Fall & Injury Prevention, visit: [email or web address] OR  [email or web address] OR  https://www.cdc.gov/steadi/patient.html

## 2022-02-02 LAB
ALBUMIN SERPL ELPH-MCNC: 2.6 G/DL — LOW (ref 3.3–5)
ALP SERPL-CCNC: 82 U/L — SIGNIFICANT CHANGE UP (ref 40–120)
ALT FLD-CCNC: 24 U/L — SIGNIFICANT CHANGE UP (ref 12–78)
ANION GAP SERPL CALC-SCNC: 5 MMOL/L — SIGNIFICANT CHANGE UP (ref 5–17)
APTT BLD: 63.8 SEC — HIGH (ref 27.5–35.5)
APTT BLD: 64.2 SEC — HIGH (ref 27.5–35.5)
AST SERPL-CCNC: 10 U/L — LOW (ref 15–37)
BASOPHILS # BLD AUTO: 0.02 K/UL — SIGNIFICANT CHANGE UP (ref 0–0.2)
BASOPHILS NFR BLD AUTO: 0.2 % — SIGNIFICANT CHANGE UP (ref 0–2)
BILIRUB SERPL-MCNC: 0.7 MG/DL — SIGNIFICANT CHANGE UP (ref 0.2–1.2)
BUN SERPL-MCNC: 6 MG/DL — LOW (ref 7–23)
CALCIUM SERPL-MCNC: 8.4 MG/DL — LOW (ref 8.5–10.1)
CHLORIDE SERPL-SCNC: 104 MMOL/L — SIGNIFICANT CHANGE UP (ref 96–108)
CK MB BLD-MCNC: <1.2 % — SIGNIFICANT CHANGE UP (ref 0–3.5)
CK MB CFR SERPL CALC: <1 NG/ML — SIGNIFICANT CHANGE UP (ref 0–3.6)
CK SERPL-CCNC: 80 U/L — SIGNIFICANT CHANGE UP (ref 26–192)
CO2 SERPL-SCNC: 26 MMOL/L — SIGNIFICANT CHANGE UP (ref 22–31)
CREAT SERPL-MCNC: 0.79 MG/DL — SIGNIFICANT CHANGE UP (ref 0.5–1.3)
EOSINOPHIL # BLD AUTO: 0.2 K/UL — SIGNIFICANT CHANGE UP (ref 0–0.5)
EOSINOPHIL NFR BLD AUTO: 1.7 % — SIGNIFICANT CHANGE UP (ref 0–6)
GLUCOSE SERPL-MCNC: 111 MG/DL — HIGH (ref 70–99)
HCT VFR BLD CALC: 26.8 % — LOW (ref 34.5–45)
HGB BLD-MCNC: 8.4 G/DL — LOW (ref 11.5–15.5)
IMM GRANULOCYTES NFR BLD AUTO: 0.3 % — SIGNIFICANT CHANGE UP (ref 0–1.5)
LYMPHOCYTES # BLD AUTO: 1.78 K/UL — SIGNIFICANT CHANGE UP (ref 1–3.3)
LYMPHOCYTES # BLD AUTO: 15.1 % — SIGNIFICANT CHANGE UP (ref 13–44)
MAGNESIUM SERPL-MCNC: 2.1 MG/DL — SIGNIFICANT CHANGE UP (ref 1.6–2.6)
MCHC RBC-ENTMCNC: 27 PG — SIGNIFICANT CHANGE UP (ref 27–34)
MCHC RBC-ENTMCNC: 31.3 GM/DL — LOW (ref 32–36)
MCV RBC AUTO: 86.2 FL — SIGNIFICANT CHANGE UP (ref 80–100)
MONOCYTES # BLD AUTO: 1.19 K/UL — HIGH (ref 0–0.9)
MONOCYTES NFR BLD AUTO: 10.1 % — SIGNIFICANT CHANGE UP (ref 2–14)
NEUTROPHILS # BLD AUTO: 8.56 K/UL — HIGH (ref 1.8–7.4)
NEUTROPHILS NFR BLD AUTO: 72.6 % — SIGNIFICANT CHANGE UP (ref 43–77)
NRBC # BLD: 0 /100 WBCS — SIGNIFICANT CHANGE UP (ref 0–0)
PHOSPHATE SERPL-MCNC: 2.7 MG/DL — SIGNIFICANT CHANGE UP (ref 2.5–4.5)
PLATELET # BLD AUTO: 468 K/UL — HIGH (ref 150–400)
POTASSIUM SERPL-MCNC: 3.7 MMOL/L — SIGNIFICANT CHANGE UP (ref 3.5–5.3)
POTASSIUM SERPL-SCNC: 3.7 MMOL/L — SIGNIFICANT CHANGE UP (ref 3.5–5.3)
PROT SERPL-MCNC: 7.9 G/DL — SIGNIFICANT CHANGE UP (ref 6–8.3)
RBC # BLD: 3.11 M/UL — LOW (ref 3.8–5.2)
RBC # FLD: 15.4 % — HIGH (ref 10.3–14.5)
SODIUM SERPL-SCNC: 135 MMOL/L — SIGNIFICANT CHANGE UP (ref 135–145)
TROPONIN I, HIGH SENSITIVITY RESULT: 571.9 NG/L — HIGH
WBC # BLD: 11.79 K/UL — HIGH (ref 3.8–10.5)
WBC # FLD AUTO: 11.79 K/UL — HIGH (ref 3.8–10.5)

## 2022-02-02 PROCEDURE — 99233 SBSQ HOSP IP/OBS HIGH 50: CPT | Mod: GC

## 2022-02-02 PROCEDURE — 93970 EXTREMITY STUDY: CPT | Mod: 26

## 2022-02-02 PROCEDURE — 93010 ELECTROCARDIOGRAM REPORT: CPT

## 2022-02-02 PROCEDURE — 99232 SBSQ HOSP IP/OBS MODERATE 35: CPT

## 2022-02-02 RX ORDER — APIXABAN 2.5 MG/1
10 TABLET, FILM COATED ORAL EVERY 12 HOURS
Refills: 0 | Status: DISCONTINUED | OUTPATIENT
Start: 2022-02-02 | End: 2022-02-02

## 2022-02-02 RX ORDER — HEPARIN SODIUM 5000 [USP'U]/ML
2000 INJECTION INTRAVENOUS; SUBCUTANEOUS
Qty: 25000 | Refills: 0 | Status: DISCONTINUED | OUTPATIENT
Start: 2022-02-02 | End: 2022-02-03

## 2022-02-02 RX ORDER — COLCHICINE 0.6 MG
0.6 TABLET ORAL ONCE
Refills: 0 | Status: COMPLETED | OUTPATIENT
Start: 2022-02-02 | End: 2022-02-02

## 2022-02-02 RX ADMIN — Medication 650 MILLIGRAM(S): at 23:00

## 2022-02-02 RX ADMIN — Medication 600 MILLIGRAM(S): at 05:19

## 2022-02-02 RX ADMIN — HEPARIN SODIUM 2000 UNIT(S)/HR: 5000 INJECTION INTRAVENOUS; SUBCUTANEOUS at 07:28

## 2022-02-02 RX ADMIN — CLOPIDOGREL BISULFATE 75 MILLIGRAM(S): 75 TABLET, FILM COATED ORAL at 09:51

## 2022-02-02 RX ADMIN — Medication 150 MICROGRAM(S): at 05:20

## 2022-02-02 RX ADMIN — Medication 25 MILLIGRAM(S): at 05:19

## 2022-02-02 RX ADMIN — ATORVASTATIN CALCIUM 10 MILLIGRAM(S): 80 TABLET, FILM COATED ORAL at 22:13

## 2022-02-02 RX ADMIN — LIDOCAINE 1 PATCH: 4 CREAM TOPICAL at 21:15

## 2022-02-02 RX ADMIN — HEPARIN SODIUM 2000 UNIT(S)/HR: 5000 INJECTION INTRAVENOUS; SUBCUTANEOUS at 05:49

## 2022-02-02 RX ADMIN — HEPARIN SODIUM 2000 UNIT(S)/HR: 5000 INJECTION INTRAVENOUS; SUBCUTANEOUS at 19:17

## 2022-02-02 RX ADMIN — LISINOPRIL 2.5 MILLIGRAM(S): 2.5 TABLET ORAL at 05:20

## 2022-02-02 RX ADMIN — LIDOCAINE 1 PATCH: 4 CREAM TOPICAL at 09:51

## 2022-02-02 RX ADMIN — LIDOCAINE 1 PATCH: 4 CREAM TOPICAL at 19:54

## 2022-02-02 RX ADMIN — Medication 0.6 MILLIGRAM(S): at 02:10

## 2022-02-02 RX ADMIN — PANTOPRAZOLE SODIUM 40 MILLIGRAM(S): 20 TABLET, DELAYED RELEASE ORAL at 05:19

## 2022-02-02 RX ADMIN — LIDOCAINE 1 PATCH: 4 CREAM TOPICAL at 00:54

## 2022-02-02 RX ADMIN — Medication 650 MILLIGRAM(S): at 22:14

## 2022-02-02 NOTE — PROGRESS NOTE ADULT - ATTENDING COMMENTS
The patient was seen and evaluated independently by the attending physician.  - I have personally reviewed the pt's labs, imaging, micro data and consultant recommendations.     The patient was seen and assess that bedside with her sister present for our interaction.  The patient states that she had chest pain earlier this morning and was given ibuprofen which helped relieve her discomfort. She has had no chest pain since that time. Today is the best and most comfortable day of hospitalization that she has experienced thus far. Her appetite is good. She has had no fever or chills. No other complaints reported at this time. Eager for dc planning The patient was seen and evaluated independently by the attending physician.  - I have personally reviewed the pt's labs, imaging, micro data and consultant recommendations.     The patient was seen and assessed that bedside with her sister present for our interaction. (pt permission was granted to discuss tx plan in front of family)  The patient states that she had chest pain earlier this morning and was given ibuprofen which helped relieve her discomfort. She has had no chest pain since that time. Today is the best and most comfortable day of hospitalization that she has experienced thus far. Her appetite is good. She has had no fever or chills. No other complaints reported at this time. Eager for dc planning    exam as above    labs/vitals/rads reviewed    #provoked DVT and PE  #recent cardiac intervention  #recent COVID infection  #DVT is proximal/above knee    - repeat u/s to assess for possible vascular intervention - given her recent complicated medical history, would be very unlikely to recommend thrombectomy. safer option is likely to continue therapeutic AC for a minimum of 3 months but will repeat venous doppler to assess for progression/migration of the proximal DVT  - repeat echo to assess pericardial effusion  - discussed w/ vascular this evening, will transition likely to eliquis tomorrow morning. continue heparin for tonight  - otherwise tx plan as dteailed above

## 2022-02-02 NOTE — PROGRESS NOTE ADULT - SUBJECTIVE AND OBJECTIVE BOX
[facility] -- Liz Dockery Grossman, [place], Kit Garcia, Idalia David: Office # [phone]    Follow Up:  PE/DVT, SCAD, POBA    Subjective/Observations:  Patient seen and examined. Events noted. Resting comfortably in bed. No complaints of chest pain,  or palpitations reported. No signs of orthopnea or PND. dyspnea improved CP relieved with ibuprofen.    REVIEW OF SYSTEMS: All review of systems is negative for eye, ENT, GI, , allergic, dermatologic, musculoskeletal and neurologic except as described above    PAST MEDICAL & SURGICAL HISTORY:  Hypothyroidism    CAD (coronary artery disease)    NSTEMI (non-ST elevation myocardial infarction)    S/P cardiac catheterization  &amp; balloon angioplasty with thrombotic lesion in OM1        MEDICATIONS  (STANDING):  atorvastatin 10 milliGRAM(s) Oral at bedtime  clopidogrel Tablet 75 milliGRAM(s) Oral daily  heparin  Infusion.  Unit(s)/Hr (20 mL/Hr) IV Continuous <Continuous>  levothyroxine 150 MICROGram(s) Oral daily  lidocaine   4% Patch 1 Patch Transdermal daily  lisinopril 2.5 milliGRAM(s) Oral daily  metoprolol succinate ER 25 milliGRAM(s) Oral daily  pantoprazole    Tablet 40 milliGRAM(s) Oral before breakfast    MEDICATIONS  (PRN):  acetaminophen     Tablet .. 650 milliGRAM(s) Oral every 8 hours PRN Mild Pain (1 - 3)  heparin   Injectable 9000 Unit(s) IV Push every 6 hours PRN For aPTT less than 40  heparin   Injectable 4500 Unit(s) IV Push every 6 hours PRN For aPTT between 40 - 57  ibuprofen  Tablet. 600 milliGRAM(s) Oral every 8 hours PRN Severe Pain (7 - 10)  ondansetron Injectable 4 milliGRAM(s) IV Push every 6 hours PRN Nausea and/or Vomiting    Allergies    No Known Allergies    Intolerances      Vital Signs Last 24 Hrs  T(C): 36.8 (2022 11:38), Max: 37.2 (2022 05:07)  T(F): 98.2 (2022 11:38), Max: 99 (2022 05:07)  HR: 89 (2022 11:38) (89 - 98)  BP: 115/76 (2022 11:38) (106/70 - 116/75)  BP(mean): --  RR: 18 (2022 11:38) (18 - 18)  SpO2: 98% (2022 11:38) (94% - 98%)  I&O's Summary    2022 07:01  -  2022 07:00  --------------------------------------------------------  IN: 220 mL / OUT: 850 mL / NET: -630 mL          TELE: SR 80  PHYSICAL EXAM:  Appearance: NAD, no distress, alert, obese  HEENT: Moist Mucous Membranes, Anicteric  Cardiovascular: Regular rate and rhythm, Normal S1 S2, No JVD, No murmurs, No rubs, gallops or clicks  Respiratory: Non-labored, Clear to auscultation, No rales, No rhonchi, No wheezing.   Gastrointestinal:  Soft, Non-tender, + BS  Neurologic: Non-focal  Skin: Warm and dry, No visible rashes or ulcers, + right thigh  ecchymosis, No cyanosis  Musculoskeletal: No clubbing, No cyanosis, No joint swelling/tenderness  Psychiatry: Mood & affect appropriate  Lymph: No peripheral edema.     LABS: All Labs Reviewed:                        8.4     )-----------( 468      ( 2022 04:02 )             26.8                         8.5    11.66 )-----------( 487      ( 2022 14:37 )             26.4                         8.5    12.25 )-----------( 436      ( 2022 09:03 )             27.2     2022 04:02    135    |  104    |  6      ----------------------------<  111    3.7     |  26     |  0.79   2022 09:03    136    |  103    |  6      ----------------------------<  102    3.4     |  26     |  0.80   2022 06:40    134    |  103    |  6      ----------------------------<  111    3.6     |  26     |  0.84     Ca    8.4        2022 04:02  Ca    8.5        2022 09:03  Ca    8.4        2022 06:40  Phos  2.7       2022 04:02  Phos  3.1       2022 09:03  Phos  3.3       2022 06:40  Mg     2.1       2022 04:02  Mg     2.3       2022 09:03  Mg     2.1       2022 06:40    TPro  7.9    /  Alb  2.6    /  TBili  0.7    /  DBili  x      /  AST  10     /  ALT  24     /  AlkPhos  82     2022 04:02  TPro  8.0    /  Alb  2.6    /  TBili  1.0    /  DBili  x      /  AST  11     /  ALT  24     /  AlkPhos  78     2022 09:03  TPro  8.2    /  Alb  2.8    /  TBili  1.2    /  DBili  x      /  AST  14     /  ALT  38     /  AlkPhos  81     2022 06:40    PTT - ( 2022 06:18 )  PTT:64.2 sec  Creatine Kinase, Serum: 80 U/L (22 @ 02:03)  Troponin I, High Sensitivity Result: 571.9 ng/L (22 @ 02:03)  Creatine Kinase, Serum: 86 U/L (22 @ 09:03)  Troponin I, High Sensitivity Result: 684.3 ng/L (22 @ 09:03)  Creatine Kinase, Serum: 97 U/L (22 @ 17:09)      D-Dimer Assay, Quantitative: 1035 ng/mL DDU (22 @ 09:37)        Cholesterol, Serum: 159 mg/dL (22 @ 09:20)  HDL Cholesterol, Serum: 58 mg/dL (22 @ 09:20)  Triglycerides, Serum: 61 mg/dL (22 @ 09:20)      12 Lead ECG:   Ventricular Rate 98 BPM    Atrial Rate 98 BPM    P-R Interval 140 ms    QRS Duration 68 ms    Q-T Interval 370 ms    QTC Calculation(Bazett) 472 ms    P Axis 44 degrees    R Axis -11 degrees    T Axis 9 degrees    Diagnosis Line Normal sinus rhythm  Low voltage QRS  Nonspecific T wave abnormality  Prolonged QT  Abnormal ECG  When compared with ECG of 2022 01:34, (Unconfirmed)  No significant change was found  Confirmed by Neva Santos MD ([id number]) on 2022 11:20:00 AM (22 @ 01:35)    < from: TTE Echo Complete w/o Contrast w/ Doppler (22 @ 14:53) >   EXAM:  ECHO TTE WO CON COMP W DOPP         PROCEDURE DATE:  2022        INTERPRETATION:  INDICATION: Pulmonary embolism  Sonographer KL    Blood Pressure 109/74    Height 167.6 cm     Weight 113.4 kg       BSA   2.2 sq m    Dimensions:  LA 3.3       Normal Values: 2.0 - 4.0 cm  Ao 2.4        Normal Values: 2.0 - 3.8 cm  SEPTUM 1.1       Normal Values: 0.6 - 1.2 cm  PWT 1.0       Normal Values: 0.6 - 1.1 cm  LVIDd 5.4         Normal Values: 3.0 - 5.6 cm  LVIDs 4.3         Normal Values: 1.8 - 4.0 cm      OBSERVATIONS:  Technically difficult and limited study, patient sitting upright in chair  Mitral Valve: normal, trace physiologic MR.  Aortic Valve/Aorta: normal trileaflet aortic valve.  Tricuspid Valve: normal with trace TR.  Pulmonic Valve: Not well-visualized  Left Atrium: normal  Right Atrium: Not well-visualized  Left Ventricle: Mild left ventricular systolic dysfunction, estimated   LVEF of 45%. Left ventricular endocardium is not well-visualized, in   certain views the inferior and inferolateral walls appear hypokinetic  Right Ventricle: Not well-visualized  Pericardium: Small pericardial effusion.        IMPRESSION:  Technically difficult and limited study  Mild left ventricular systolic dysfunction, estimated LVEF of45%. Left   ventricular endocardium is not well-visualized, in certain views the   inferior and inferolateral walls appear hypokinetic  The right ventricle is not well-visualized  Normal trileaflet aortic valve, without AI.  Trace physiologic MR and TR.  Small pericardial effusion.    --- End of Report ---        < end of copied text >    < from: US Duplex Venous Lower Ext Complete, Bilateral (22 @ 17:42) >  ACC: [id number] EXAM:  US DPLX LWR EXT VEINS COMPL BI                        PROCEDURE DATE:  2022    INTERPRETATION:  CLINICAL INFORMATION: Status post cardiac catheter with   concern for greater saphenous vein clot at recent CT.  COMPARISON: CT 2022  TECHNIQUE: Duplex sonography of the BILATERAL LOWER extremity veins with   color and spectral Doppler, with and without compression.  FINDINGS:    RIGHT:  Acute thrombus is present within the greater saphenous vein extending to   the common femoral vein junction. Normal compressibility of the femoral   and popliteal veins.  Doppler examination shows normal spontaneous and phasic flow.  No RIGHT calf vein thrombosis is detected.    LEFT:  Normal compressibility of the LEFT common femoral, femoral and popliteal   veins.  Doppler examination shows normal spontaneous and phasic flow.  No LEFT calf vein thrombosis is detected.    IMPRESSION:  Acute deep venous thrombosis at the right greater saphenous vein/common   femoral vein junction.  Acute deep venous thrombosis: above the knee.    Findings were discussed with Dr. NEVA SHIPLEY  2022 5:55 PM by Dr. Price with read back confirmation.  --- End of Report ---  < end of copied text >    < from: CT Abdomen and Pelvis w/ IV Cont (22 @ 15:45) >  ACC: [id number] EXAM:  CT ABDOMEN AND PELVIS IC                        PROCEDURE DATE:  2022    INTERPRETATION:  CLINICAL INFORMATION: Status post cardiac catheter with   right groin hematoma. Evaluate for retroperitoneal bleed.  COMPARISON: None.  CONTRAST/COMPLICATIONS:  IV Contrast: Omnipaque 350  90 cc administered   10 cc discarded  Oral Contrast: NONE  Complications: None reported at time of study completion    PROCEDURE:  CT of the Abdomen and Pelvis was performed.  Sagittal and coronal reformats were performed.    FINDINGS:  LOWER CHEST: Small pericardial effusion with questionable enhancing   pericardium.  LIVER: A 1.6 cm indeterminate hypodense lesion in the left hepatic lobe.  BILE DUCTS: Normal caliber.  GALLBLADDER: Within normal limits.  SPLEEN: Within normal limits.  PANCREAS: Within normal limits.  ADRENALS: Within normal limits.  KIDNEYS/URETERS: Left renal cortical scarring.  BLADDER: Within normal limits.  REPRODUCTIVE ORGANS: Enlarged fibroid uterus. No adnexal mass.  BOWEL: No bowel obstruction.  PERITONEUM: No ascites.  VESSELS: Right greater saphenous vein is expanded and hypodense.   Heterogeneity of the right external iliac vein.  RETROPERITONEUM/LYMPH NODES: No lymphadenopathy. Small hemorrhage in the   extraperitoneal space of the right pelvis along the right pelvic side   wall. No discrete collection.  ABDOMINAL WALL: No right groin collection.  BONES: Degenerative changes.    IMPRESSION:  Small hemorrhage in the extraperitoneal space of the right pelvis along   the right pelvic sidewall without discrete collection.    Expanded right greater saphenous vein and heterogeneity of the right   external iliac vein raising concern for thrombosis. Correlation with   right lower extremity venous Doppler is recommended with specific   attention to these vessels.    Small pericardial effusion with concern for mild pericardial enhancement   which may be infectious or inflammatory.  A 1.6 cm indeterminate left hepatic lobe lesion.  --- End of Report ---  < end of copied text >    < from: Cardiac Catheterization (22 @ 13:24) >  Study Date:     2022   Name:           YU RED   :            1970   (51 years)   Gender:         female   MR#:            [id number]   [facility]#:           [id number]   Patient Class:  Inpatient     Cath Lab Report    Diagnostic Cardiologist:       Srinivas Lamb MD   Fellow:                        Lamin Crain MD   Fellow:                        Renato Flor MD   Monitor:                       FREDERIC Carpenter   Scrub:                         FREDERIC Garcia   Nurse:                        René Rodríguez   Referring Physician:           Neva Santos MD     Procedures Performed   Procedures:               1.    Left Coronary Angio     2.    Sonosite - Diagnostic   3.    Art Access - L femoral artery*   4.    Hemostasis with Angioseal     Indications:               Myocardial infarction without ST elevation  (NSTEMI)    Diagnostic Conclusions:     It appears the distal OM lesions are most likely secondary to SCAD  given the normal angiographic appearance of the rest of the  vessel     Procedure Narrative:   The risks and alternatives of the procedures and conscious sedation  were explained to the patient and informed consent was  obtained. The patient was brought to the cath lab and placed on the  exam table.  Access   Left femoral artery:   The puncture site was infiltrated with 1% Lidocaine. Vascular access  was obtained using modified seldinger technique.    Diagnostic Findings:     Coronary Angiography   LM   Left main artery: Angiography shows mild atherosclerosis.      LAD   Left anterior descending artery: Angiography shows mild  atherosclerosis.    CX   First obtuse marginal: There is a 100 % stenosis. Second obtuse  marginal: There is a 100 % stenosis.    RCA   Right coronary artery: Angiography shows mild atherosclerosis.      Patient: YU RED          MRN: [id number]  Study Date: 2022   01:24 PM      Page 1 of 3    History and Risk Factors:   Hypertension:                             Yes   Dyslipidemia:                          Yes   Prior MI:                                 No   Prior PCI:                                Yes   Family History of Premature CAD:          No   Cerebrovascular Disease:                  No   Peripheral Arterial Disease:            No   Cardiac Arrest Out of Hospital:           No   Prior Heart Failure:                      No   Diabetes:                                 No   Exam Start Time:   01:24 PM   Exam End Time:     01:36 PM   Exam Duration:     12 min     Contrast:   Description                    Dose         Unit       Serial No.   Omnipaque                         27.000   mL     Medication:   Description                 Dose, Unit, Route, Time   midazolam  1 mG/mL          1.0, mg, IV, 13:24:44   Injectable (versed)   fentaNYL 0.05 mG/mL         25.0, mcg, IV, 13:24:49   Injectable  (Fentanyl)     Inventory:   Description                  Quantity     [employer]#        Reference  No.    Serial No.      Lot No.       CDM  CATH PACK 1.000        [id number]   [id number]  [id number]    030   [employer] SYRINGE LAB 2 /    1.000        [id number]   [id number]   4                                      604   HEPARIN SALINE 1000 USP   1.000        [id number]   -56  [id number]  Units/500 mL                           397   HEPARIN SALINE 1000 USP   1.000        [id number]   -51  [id number]  Units/500 mL                           397   OMNIPAQUE 350 150ML       1.000        [id number]   [id number]     368   .035 X 150 GUIDERIGHT     1.000        [id number]   [id number]  [id number]    594   6FR SHEATH PINNACLE 1.000              [id number]   [id number]  [id number]    644   6FR EBU 3.0 LAUNCHER      1.000        [id number]   [id number]  [id number]    971   6FR VIP ANGIOSEAL         1.000        [id number]   [id number]  [id number]    975   Hemodynamic Pressures:   Phase          Location           [mmHg]               [mmHg]  [mmHg]           HR    Patient: YU RED          MRN: [id number]  Study Date: 2022   01:24 PM      Page 2 of 3    Baseline       Ao                     s       111                 d  71                 m       87          94    Conscious sedation was administered and monitored by Cardiology  nursing staff,  under my direct supervision when applicable.     Electronically signed by Srinivas Lamb MD on 2022 at 03:03 PM     Patient: YU RED          MRN: [id number]  Study Date: 2022   01:24 PM      Page 3 of 3    < end of copied text >      < from: CT Angio Chest PE Protocol w/ IV Cont (22 @ 10:43) >  IMPRESSION:  Bilateral pulmonary emboli involving segmental right lower lobe pulmonary   arterial branches, left lingula branch pulmonary artery and segmental   left lower lobe pulmonary arterial branches as discussed.  No CT evidence for right-sided heart strain.  Patchy atelectatic changes lung bases, cannot exclude developing   pulmonary infarct.  Small left-sided pleural effusion.  Small to moderate pericardial effusion, which is increased in size from   CT scan of the abdomen and pelvis performed on 2022.  Other findings as discussed above.  --- End of Report ---  ***Please see the addendum at the top of this report. It may contain   additional important information or changes.****  < end of copied text >

## 2022-02-02 NOTE — PROGRESS NOTE ADULT - SUBJECTIVE AND OBJECTIVE BOX
Patient is a 51y old  Female who presents with a chief complaint of R above the knee DVT (02 Feb 2022 14:43)      INTERVAL HPI/OVERNIGHT EVENTS: Patient seen and examined at bedside. Patient c/o chest pain overnight, received colchicine, cardiac enzymes negative/troponin downtrending. This morning patient reports she had a bad night of sleep due to pain and feels sleepy but that her pain is resolved. Denies fevers, chills, headache, chest pain, dyspnea, abdominal pain, n/v/d/c.    MEDICATIONS  (STANDING):  apixaban 10 milliGRAM(s) Oral every 12 hours  atorvastatin 10 milliGRAM(s) Oral at bedtime  clopidogrel Tablet 75 milliGRAM(s) Oral daily  heparin  Infusion.  Unit(s)/Hr (20 mL/Hr) IV Continuous <Continuous>  levothyroxine 150 MICROGram(s) Oral daily  lidocaine   4% Patch 1 Patch Transdermal daily  lisinopril 2.5 milliGRAM(s) Oral daily  metoprolol succinate ER 25 milliGRAM(s) Oral daily  pantoprazole    Tablet 40 milliGRAM(s) Oral before breakfast    MEDICATIONS  (PRN):  acetaminophen     Tablet .. 650 milliGRAM(s) Oral every 8 hours PRN Mild Pain (1 - 3)  heparin   Injectable 9000 Unit(s) IV Push every 6 hours PRN For aPTT less than 40  heparin   Injectable 4500 Unit(s) IV Push every 6 hours PRN For aPTT between 40 - 57  ondansetron Injectable 4 milliGRAM(s) IV Push every 6 hours PRN Nausea and/or Vomiting      Allergies    No Known Allergies    Intolerances        REVIEW OF SYSTEMS:  CONSTITUTIONAL: No fever or chills  HEENT:  No headache  RESPIRATORY: No shortness of breath  CARDIOVASCULAR: No chest pain  GASTROINTESTINAL: No abd pain, nausea, vomiting  GENITOURINARY: No dysuria    Vital Signs Last 24 Hrs  T(C): 36.8 (02 Feb 2022 11:38), Max: 37.2 (02 Feb 2022 05:07)  T(F): 98.2 (02 Feb 2022 11:38), Max: 99 (02 Feb 2022 05:07)  HR: 89 (02 Feb 2022 11:38) (89 - 98)  BP: 115/76 (02 Feb 2022 11:38) (106/70 - 116/75)  BP(mean): --  RR: 18 (02 Feb 2022 11:38) (18 - 18)  SpO2: 98% (02 Feb 2022 11:38) (94% - 98%)    PHYSICAL EXAM:  GENERAL: NAD, resting comfortably  HEENT:  anicteric, moist mucous membranes  CHEST/LUNG:  b/l breath sounds diminished but clear, no rales, wheezes, or rhonchi  HEART:  tachy, S1, S2, no murmurs, rubs, or gallops  ABDOMEN:  Soft, nontender, nondistended  EXTREMITIES: no pedal edema, cyanosis, or calf tenderness  NERVOUS SYSTEM: answers questions and follows commands appropriately    LABS:                        8.4    11.79 )-----------( 468      ( 02 Feb 2022 04:02 )             26.8     CBC Full  -  ( 02 Feb 2022 04:02 )  WBC Count : 11.79 K/uL  Hemoglobin : 8.4 g/dL  Hematocrit : 26.8 %  Platelet Count - Automated : 468 K/uL  Mean Cell Volume : 86.2 fl  Mean Cell Hemoglobin : 27.0 pg  Mean Cell Hemoglobin Concentration : 31.3 gm/dL  Auto Neutrophil # : 8.56 K/uL  Auto Lymphocyte # : 1.78 K/uL  Auto Monocyte # : 1.19 K/uL  Auto Eosinophil # : 0.20 K/uL  Auto Basophil # : 0.02 K/uL  Auto Neutrophil % : 72.6 %  Auto Lymphocyte % : 15.1 %  Auto Monocyte % : 10.1 %  Auto Eosinophil % : 1.7 %  Auto Basophil % : 0.2 %    02 Feb 2022 04:02    135    |  104    |  6      ----------------------------<  111    3.7     |  26     |  0.79     Ca    8.4        02 Feb 2022 04:02  Phos  2.7       02 Feb 2022 04:02  Mg     2.1       02 Feb 2022 04:02    TPro  7.9    /  Alb  2.6    /  TBili  0.7    /  DBili  x      /  AST  10     /  ALT  24     /  AlkPhos  82     02 Feb 2022 04:02    PTT - ( 02 Feb 2022 06:18 )  PTT:64.2 sec    CAPILLARY BLOOD GLUCOSE              RADIOLOGY & ADDITIONAL TESTS:    Personally reviewed.     Consultant(s) Notes Reviewed:  [x] YES  [ ] NO

## 2022-02-02 NOTE — DIETITIAN INITIAL EVALUATION ADULT. - PROBLEM SELECTOR PLAN 3
Patient complaining of L shoulder pain from [facility] when she pulled on something  - X-ray L shoulder 1/13 w/ no acute findings   - Uses Tylenol and heating pad at home   - Will continue Tylenol PRN for mild pain and start on Lidocaine patch

## 2022-02-02 NOTE — DIETITIAN INITIAL EVALUATION ADULT. - OTHER INFO
Reason for Admission: R above the knee DVT  52 yo female with PMHx of CAD s/p POBA to OM1 followed by PCI c/w SCAD, hypothyroidism, former smoker presents to the ED for R upper leg pain. Patient states she noticed ecchymosis of R upper thigh since past few days with no associated pain, but slight discomfort.  patient with no food allergies  discussed heart healthy low Na diet with patient verbal and written review

## 2022-02-02 NOTE — DIETITIAN INITIAL EVALUATION ADULT. - PROBLEM SELECTOR PLAN 2
CAD with NSTEMI s/p POBA to OM1 followed by PCI c/w SCAD on 1/13  - following with Dr. Kay outpatient cardiology   - started on ASA 81mg, Atorvastatin 10mg, Ergocalciferol 1.25, Lisinopril 2.5mg, and Metoprolol Succinate 25mg   - continue home medications with hold parameters

## 2022-02-02 NOTE — PROGRESS NOTE ADULT - ATTENDING COMMENTS
Chart reviewed    Patient seen and examined    Agree with plan as outlined    51 F with CAD s/p recent stent to OM1 c/w SCAD, and hypothyroidism p/w right upper thigh ecchymosis that she noticed 2 days ago.  Denies dizziness or lightheadedness.  Denies numbness or tingling of RLE. Patient had recently had a cardiac cath on 1/13 and 14 using an angioseal with no complication on exit site of catheter.      CAD S/P recent OM1 angioplasty/DVT  - S/P 1/13 NSTEMI with thrombotic lesion in OM1 S/P POBA p/w right thigh ecchymosis   - 1/30 LE Doppler Acute Right DVT, heparin gtt initiated  - CTAP: small hemorrhage in the extraperitoneal space of the right pelvis along the right pelvic sidewall without discrete collection, and small pericardial effusion surgery following ok with heparin gtt, no bolus  - H/H stable.     - 1/31 in the AM patient reporting chest pain, worse with inspiration and when laying flat   - D-Dimer 1035, CTA done revealing b/l PEs, small to moderate pericardial effusion without evidence of R heart strain  - TTE: RV not well visualized mild LV dysfunction EF 45% essentially unchanged small pericardial effusion  - Continue Heparin gtt IV for AC transition to Eliquis today  - Troponin elevated and was trended until peak, unlikely plaque rupture but rather demand ischemia in setting of PE   - EKG SR 94 low voltage, no acute ischemia   - CP seems related to post MI pericarditis given effusion, and pain was relieved with ibuprofen   - Consider colchicine if needed for further pain  - Given that she had SCAD POBA without PCI, and needs FD AC, we do not need triple therapy  - Continue Plavix and D/C ASA  - Continue BB, statin  - BP stable continue Lisinopril

## 2022-02-02 NOTE — SBIRT NOTE ADULT - NSSBIRTALCPOSREINDET_GEN_A_CORE
States she drinks 1 glass of wine a few times per week, denies any issues. Positive reinforcement provided.

## 2022-02-02 NOTE — PROGRESS NOTE ADULT - ASSESSMENT
50 yo female with PMHx of hypothyroidism, CAD/NSTEMI s/p POBA to OM1 followed by PCI c/w SCAD, former smoker admitted for R acute DVT at greater saphenous vein/common femoral vein junction and now b/l PE

## 2022-02-02 NOTE — PROGRESS NOTE ADULT - PROBLEM SELECTOR PLAN 2
- pt with pleuritic chest pain that improves with upright positioning, history of MI earlier this month, possible post-MI pericarditis  - cardiology following  - pericardial effusion seen on CTA - growing in size  - TTE 1/31 with small pericardial effusion - f/u repeat in AM - TTE ordered for 2/2 AM but not performed. left message with echo department to expedite study  - pain control prn - tylenol prns available. can use colchine as well - pt with pleuritic chest pain that improves with upright positioning, history of MI earlier this month, possible post-MI pericarditis  - cardiology following  - pericardial effusion seen on CTA  - TTE 1/31 with small pericardial effusion - f/u repeat in AM - TTE ordered for 2/2 AM but not performed. left message with echo department to expedite study  - pain control prn - tylenol prns available - avoid nsaids and colchicine

## 2022-02-02 NOTE — DIETITIAN INITIAL EVALUATION ADULT. - PROBLEM SELECTOR PLAN 4
Hbg on admission 9.3, with baseline of around 12  - CT abd on admission: Small hemorrhage in the extraperitoneal space of the right pelvis along the right pelvic sidewall without discrete collection .Small pericardial effusion with concern for mild pericardial enhancement which may be infectious or inflammatory. A 1.6 cm indeterminate left hepatic lobe lesion.  - no acute hematoma on physical exam   - will monitor HH closely for any acute drop  - check iron studies in AM

## 2022-02-02 NOTE — PROGRESS NOTE ADULT - ASSESSMENT
52 yo female with PMHx of hypothyroidism, CAD/NSTEMI s/p POBA to OM1 followed by PCI c/w SCAD, former smoker admitted for R acute DVT at greater saphenous vein/common femoral vein junction.    remains with intermittent CP - overnight events noted -   cardio f/u  pain rx regimen  tolerating RA - VS noted  TTE repeat as per Cardio  switch to DOAC today - Eliquis -     DVT - PE - possible evolving Pulm Infarct - AC (on Heparin gtt) - I carlos alberto - monitor VS and Sat - RV strain eval - ProBNP and CE noted -   pericardial effusion - pericarditis eval - TTE - CT imaging - Cardio f/u - Hemodynamic monitoring -   Covid 19 - positive on JAN 13 - repeat Neg on Jan   CAD HF - cvs rx regimen - cardio f/u  Obesity - weight management - ANUJ eval as outpatient  Ex Smoker - PFT testing as outpatient - Pulm Eval as outpatient for COPD  CTAP: small hemorrhage in the extraperitoneal space of the right pelvis along the right pelvic sidewall without discrete collection - on Hep gtt - serial H and H, Surgery following -   ICU eval noted  monitor VS and HD and Sat

## 2022-02-02 NOTE — PROGRESS NOTE ADULT - PROBLEM SELECTOR PLAN 5
Patient complaining of L shoulder pain from [facility] when she pulled on something  - X-ray L shoulder 1/13 w/ no acute findings   - Uses Tylenol and heating pad at home   - Will continue Tylenol PRN for mild pain and start on Lidocaine patch

## 2022-02-02 NOTE — PROGRESS NOTE ADULT - SUBJECTIVE AND OBJECTIVE BOX
Date/Time Patient Seen:  		  Referring MD:   Data Reviewed	       Patient is a 51y old  Female who presents with a chief complaint of R above the knee DVT (01 Feb 2022 10:09)      Subjective/HPI     PAST MEDICAL & SURGICAL HISTORY:  Hypothyroidism    CAD (coronary artery disease)    NSTEMI (non-ST elevation myocardial infarction)    No significant past surgical history    S/P cardiac catheterization  &amp; balloon angioplasty with thrombotic lesion in OM1          Medication list         MEDICATIONS  (STANDING):  atorvastatin 10 milliGRAM(s) Oral at bedtime  clopidogrel Tablet 75 milliGRAM(s) Oral daily  heparin  Infusion.  Unit(s)/Hr (20 mL/Hr) IV Continuous <Continuous>  levothyroxine 150 MICROGram(s) Oral daily  lidocaine   4% Patch 1 Patch Transdermal daily  lisinopril 2.5 milliGRAM(s) Oral daily  metoprolol succinate ER 25 milliGRAM(s) Oral daily  pantoprazole    Tablet 40 milliGRAM(s) Oral before breakfast    MEDICATIONS  (PRN):  acetaminophen     Tablet .. 650 milliGRAM(s) Oral every 8 hours PRN Mild Pain (1 - 3)  heparin   Injectable 9000 Unit(s) IV Push every 6 hours PRN For aPTT less than 40  heparin   Injectable 4500 Unit(s) IV Push every 6 hours PRN For aPTT between 40 - 57  ibuprofen  Tablet. 600 milliGRAM(s) Oral every 8 hours PRN Severe Pain (7 - 10)  ondansetron Injectable 4 milliGRAM(s) IV Push every 6 hours PRN Nausea and/or Vomiting         Vitals log        ICU Vital Signs Last 24 Hrs  T(C): 37.2 (02 Feb 2022 05:07), Max: 37.2 (02 Feb 2022 05:07)  T(F): 99 (02 Feb 2022 05:07), Max: 99 (02 Feb 2022 05:07)  HR: 98 (02 Feb 2022 05:07) (92 - 98)  BP: 110/74 (02 Feb 2022 05:07) (106/70 - 119/81)  BP(mean): --  ABP: --  ABP(mean): --  RR: 18 (02 Feb 2022 05:07) (18 - 18)  SpO2: 94% (02 Feb 2022 05:07) (93% - 95%)           Input and Output:  I&O's Detail    31 Jan 2022 07:01  -  01 Feb 2022 07:00  --------------------------------------------------------  IN:    Heparin Infusion: 240 mL  Total IN: 240 mL    OUT:    Voided (mL): 300 mL  Total OUT: 300 mL    Total NET: -60 mL      01 Feb 2022 07:01  -  02 Feb 2022 06:35  --------------------------------------------------------  IN:    Heparin Infusion: 220 mL  Total IN: 220 mL    OUT:    Voided (mL): 850 mL  Total OUT: 850 mL    Total NET: -630 mL          Lab Data                        8.4    11.79 )-----------( 468      ( 02 Feb 2022 04:02 )             26.8     02-02    135  |  104  |  6<L>  ----------------------------<  111<H>  3.7   |  26  |  0.79    Ca    8.4<L>      02 Feb 2022 04:02  Phos  2.7     02-02  Mg     2.1     02-02    TPro  7.9  /  Alb  2.6<L>  /  TBili  0.7  /  DBili  x   /  AST  10<L>  /  ALT  24  /  AlkPhos  82  02-02      CARDIAC MARKERS ( 02 Feb 2022 02:03 )  x     / x     / 80 U/L / x     / <1.0 ng/mL  CARDIAC MARKERS ( 01 Feb 2022 09:03 )  x     / x     / 86 U/L / x     / <1.0 ng/mL  CARDIAC MARKERS ( 31 Jan 2022 17:09 )  x     / x     / 97 U/L / x     / 1.1 ng/mL  CARDIAC MARKERS ( 31 Jan 2022 12:47 )  x     / x     / 90 U/L / x     / <1.0 ng/mL  CARDIAC MARKERS ( 31 Jan 2022 09:37 )  x     / x     / 67 U/L / x     / <1.0 ng/mL  CARDIAC MARKERS ( 31 Jan 2022 06:40 )  x     / x     / 68 U/L / x     / <1.0 ng/mL        Review of Systems	      Objective     Physical Examination    heart s1s2  lung dec BS  abd soft      Pertinent Lab findings & Imaging      Kaitlynn:  NO   Adequate UO     I&O's Detail    31 Jan 2022 07:01  -  01 Feb 2022 07:00  --------------------------------------------------------  IN:    Heparin Infusion: 240 mL  Total IN: 240 mL    OUT:    Voided (mL): 300 mL  Total OUT: 300 mL    Total NET: -60 mL      01 Feb 2022 07:01  -  02 Feb 2022 06:35  --------------------------------------------------------  IN:    Heparin Infusion: 220 mL  Total IN: 220 mL    OUT:    Voided (mL): 850 mL  Total OUT: 850 mL    Total NET: -630 mL               Discussed with:     Cultures:	        Radiology

## 2022-02-02 NOTE — DIETITIAN INITIAL EVALUATION ADULT. - PROBLEM SELECTOR PLAN 7
- CT abd/pelvis with IV contrast on admission: Small hemorrhage in the extraperitoneal space of the right pelvis along the right pelvic sidewall without discrete collection. Small pericardial effusion with concern for mild pericardial enhancement which may be infectious or inflammatory. A 1.6 cm indeterminate left hepatic lobe lesion.   - likely all post-operative changes but will proceed with heparin gtt instead of LMWH  - will monitor HH closely for any acute drop  - Cardio consulted, recs appreciated  - Vascular consulted, recs appreciated

## 2022-02-02 NOTE — DIETITIAN INITIAL EVALUATION ADULT. - ORAL INTAKE PTA/DIET HISTORY
patient reports with fair PO PTA with recent hospitalization with COVID 19 and MI   states wt ~260# with wt gain reported noted admit wt 210# will follow weights   multiple food preferences noted follow GERD style diet also avoids some carbohydrates. likes fresh fruit

## 2022-02-02 NOTE — PROGRESS NOTE ADULT - SUBJECTIVE AND OBJECTIVE BOX
SURGERY  Spectralink [phone]    Pt seen and examined. Pt denies any overnight events. Pt reports pain is well controlled. Reports having chest pain nightly, underwent work up overnight per primary team, followed by cardiology. Denies any numbness/paresthesias.    ICU Vital Signs Last 24 Hrs  T(C): 36.8 (02 Feb 2022 11:38), Max: 37.2 (02 Feb 2022 05:07)  T(F): 98.2 (02 Feb 2022 11:38), Max: 99 (02 Feb 2022 05:07)  HR: 89 (02 Feb 2022 11:38) (89 - 98)  BP: 115/76 (02 Feb 2022 11:38) (106/70 - 116/75)  BP(mean): --  ABP: --  ABP(mean): --  RR: 18 (02 Feb 2022 11:38) (18 - 18)  SpO2: 98% (02 Feb 2022 11:38) (94% - 98%)      I&O's Detail    01 Feb 2022 07:01  -  02 Feb 2022 07:00  --------------------------------------------------------  IN:    Heparin Infusion: 220 mL  Total IN: 220 mL    OUT:    Voided (mL): 850 mL  Total OUT: 850 mL    Total NET: -630 mL    Physical exam: Pt sitting comfortably in bed in NAD  Chest- CTA bilaterally   CV- S1 & S2, RRR  Abdomen- Soft, non-tender, non-distended. ( + ) BS  B/L LE's- (+) swelling to RLE, mild to moderate, worse to medial thigh region.  NSLT L2-S1 B/L  5/5- TA/GS/EHL  Calves soft bilaterally, no ttp  2+palpable DP B/L    LABS:                        8.4    11.79 )-----------( 468      ( 02 Feb 2022 04:02 )             26.8     02-02    135  |  104  |  6<L>  ----------------------------<  111<H>  3.7   |  26  |  0.79    Ca    8.4<L>      02 Feb 2022 04:02  Phos  2.7     02-02  Mg     2.1     02-02    TPro  7.9  /  Alb  2.6<L>  /  TBili  0.7  /  DBili  x   /  AST  10<L>  /  ALT  24  /  AlkPhos  82  02-02    PTT - ( 02 Feb 2022 06:18 )  PTT:64.2 sec    RADIOLOGY & ADDITIONAL STUDIES:    < from: US Duplex Venous Lower Ext Complete, Bilateral (01.30.22 @ 17:42) >    ACC: [id number] EXAM:  US DPLX LWR EXT VEINS COMPL BI                          PROCEDURE DATE:  01/30/2022          INTERPRETATION:  CLINICAL INFORMATION: Status post cardiac catheter with   concern for greater saphenous vein clot at recent CT.    COMPARISON: CT 1/30/2022    TECHNIQUE: Duplex sonography of the BILATERAL LOWER extremity veins with   color and spectral Doppler, with and without compression.    FINDINGS:    RIGHT:  Acute thrombus is present within the greater saphenous vein extending to   the common femoral vein junction. Normal compressibility of the femoral   and popliteal veins.  Doppler examination shows normal spontaneous and phasic flow.  No RIGHT calf vein thrombosis is detected.    LEFT:  Normal compressibility of the LEFT common femoral, femoral and popliteal   veins.  Doppler examination shows normal spontaneous and phasic flow.  No LEFT calf vein thrombosis is detected.    IMPRESSION:  Acute deep venous thrombosis at the right greater saphenous vein/common   femoral vein junction.  Acute deep venous thrombosis: above the knee.    Findings were discussed with Dr. NEVA SHIPLEY  1/30/2022 5:55 PM by Dr. Price with read back confirmation.    --- End of Report ---      HIRO PRICE MD; Attending Radiologist  This document has been electronically signed. Jan 30 2022  5:57PM    < end of copied text >    50 yo female with PMHx of CAD s/p POBA to OM1 followed by PCI c/w SCAD, hypothyroidism, former smoker with Right thigh hematoma with B/L PE's and acute Right greater saphenous vein/CFV junction DVT    -Continue IV Heparin for AC, consider transition to oral AC  -Compression and elevation to RLE- will order thigh high teds stockings  -Repeat B/L LE US to monitor for progression of DVT  -Pt with intermittent CP overnight with elevated troponin which was trended until peak- Per Cardiology : "unlikely plaque rupture but rather demand ischemia in setting of PE, EKG SR 94 low voltage, no acute ischemia, CP seems related to post MI pericarditis given effusion, and pain was relieved with ibuprofen"  -If plan to discharge patient tomorrow 2/3- please obtain B/L LE US to evaluate for progression of RLE DVT  -Will discuss with Dr. Lee

## 2022-02-02 NOTE — PROGRESS NOTE ADULT - ASSESSMENT
51 F with CAD s/p recent stent to OM1 c/w SCAD, and hypothyroidism p/w right upper thigh ecchymosis that she noticed 2 days ago.  Denies dizziness or lightheadedness.  Denies numbness or tingling of RLE. Patient had recently had a cardiac cath on 1/13 and 14 using an angioseal with no complication on exit site of catheter.      CAD S/P recent OM1 angioplasty/DVT  - S/P 1/13 NSTEMI with thrombotic lesion in OM1 S/P POBA p/w right thigh ecchymosis   - 1/30 LE Doppler Acute Right DVT, heparin gtt initiated  - CTAP: small hemorrhage in the extraperitoneal space of the right pelvis along the right pelvic sidewall without discrete collection, and small pericardial effusion surgery following ok with heparin gtt, no bolus  - H/H stable.     - 1/31 in the AM patient reporting chest pain, worse with inspiration and when laying flat   - D-Dimer 1035, CTA done revealing b/l PEs, small to moderate pericardial effusion without evidence of R heart strain  - TTE: RV not well visualized mild LV dysfunction EF 45% essentially unchanged small pericardial effusion  - Continue Heparin gtt IV for AC transition to Eliquis today  - Troponin elevated and was trended until peak, unlikely plaque rupture but rather demand ischemia in setting of PE   - EKG SR 94 low voltage, no acute ischemia   - CP seems related to post MI pericarditis given effusion, and pain was relieved with ibuprofen   - Consider colchicine if needed for further pain  - Given that she had SCAD POBA without PCI, and needs FD AC, we do not need triple therapy  - Continue Plavix and D/C ASA  - Continue BB, statin  - BP stable continue Lisinopril    - Monitor and replete lytes, keep K>4, Mg>2.  - Will continue to follow.    Rose Marie Abel, MS ANP, AGACNP  Nurse Practitioner- Cardiology   Spectra #[phone]

## 2022-02-02 NOTE — DIETITIAN INITIAL EVALUATION ADULT. - PROBLEM SELECTOR PLAN 1
Patient complaining of R upper thigh ecchymosis x 3 days, denies calf pain   - Found to be COVID + on 1/13, negative on this admission   - On admission doppler US LE: Acute deep venous thrombosis at the right greater saphenous vein/common femoral vein junction.  - s/p Lovenox 110mg subq x 1 dose   - will continue full dose anticoagulation with heparin gtt per vascular recs  - continue to monitor for signs of acute SOB and/or chest pain

## 2022-02-02 NOTE — CHART NOTE - NSCHARTNOTEFT_GEN_A_CORE
Called by RN for chest pain. Patient seen and examined at bedside. States that she is having chest pressure again 8/10 in intensity but is now radiating to her left shoulder and neck. She states pain is exacerbated while laying down or inspiration. Denies shortness of breath, nausea, vomiting, swelling of legs. On physical exam of the heart RRR +S1 +s2, lung cta b/l Called by RN for chest pain. Patient seen and examined at bedside. States that she is having chest pressure again 8/10 in intensity but is now radiating to her left shoulder and neck. She states pain is exacerbated while laying down or inspiration. Denies shortness of breath, nausea, vomiting, swelling of legs. On physical exam of the heart RRR +S1 +s2, lung cta b/l. Ordered stat cardiac enzymes. ECG now is relatively unchanged from prior ECG on 1/31. Will give 1x dose of colchicine and continue with ibuprofen.

## 2022-02-03 DIAGNOSIS — R50.9 FEVER, UNSPECIFIED: ICD-10-CM

## 2022-02-03 PROBLEM — I21.4 NON-ST ELEVATION (NSTEMI) MYOCARDIAL INFARCTION: Chronic | Status: ACTIVE | Noted: 2022-01-30

## 2022-02-03 PROBLEM — I25.10 ATHEROSCLEROTIC HEART DISEASE OF NATIVE CORONARY ARTERY WITHOUT ANGINA PECTORIS: Chronic | Status: ACTIVE | Noted: 2022-01-30

## 2022-02-03 LAB
ALBUMIN SERPL ELPH-MCNC: 2.4 G/DL — LOW (ref 3.3–5)
ALP SERPL-CCNC: 88 U/L — SIGNIFICANT CHANGE UP (ref 40–120)
ALT FLD-CCNC: 19 U/L — SIGNIFICANT CHANGE UP (ref 12–78)
ANION GAP SERPL CALC-SCNC: 7 MMOL/L — SIGNIFICANT CHANGE UP (ref 5–17)
APPEARANCE UR: ABNORMAL
APTT BLD: 33 SEC — SIGNIFICANT CHANGE UP (ref 27.5–35.5)
APTT BLD: 55.9 SEC — HIGH (ref 27.5–35.5)
APTT BLD: 63.8 SEC — HIGH (ref 27.5–35.5)
AST SERPL-CCNC: 12 U/L — LOW (ref 15–37)
BASOPHILS # BLD AUTO: 0.02 K/UL — SIGNIFICANT CHANGE UP (ref 0–0.2)
BASOPHILS NFR BLD AUTO: 0.2 % — SIGNIFICANT CHANGE UP (ref 0–2)
BILIRUB SERPL-MCNC: 0.7 MG/DL — SIGNIFICANT CHANGE UP (ref 0.2–1.2)
BILIRUB UR-MCNC: NEGATIVE — SIGNIFICANT CHANGE UP
BUN SERPL-MCNC: 8 MG/DL — SIGNIFICANT CHANGE UP (ref 7–23)
CALCIUM SERPL-MCNC: 8.4 MG/DL — LOW (ref 8.5–10.1)
CHLORIDE SERPL-SCNC: 105 MMOL/L — SIGNIFICANT CHANGE UP (ref 96–108)
CO2 SERPL-SCNC: 24 MMOL/L — SIGNIFICANT CHANGE UP (ref 22–31)
COLOR SPEC: YELLOW — SIGNIFICANT CHANGE UP
CREAT SERPL-MCNC: 0.79 MG/DL — SIGNIFICANT CHANGE UP (ref 0.5–1.3)
DIFF PNL FLD: ABNORMAL
EOSINOPHIL # BLD AUTO: 0.17 K/UL — SIGNIFICANT CHANGE UP (ref 0–0.5)
EOSINOPHIL NFR BLD AUTO: 1.5 % — SIGNIFICANT CHANGE UP (ref 0–6)
GLUCOSE SERPL-MCNC: 106 MG/DL — HIGH (ref 70–99)
GLUCOSE UR QL: NEGATIVE — SIGNIFICANT CHANGE UP
HCT VFR BLD CALC: 24.3 % — LOW (ref 34.5–45)
HCT VFR BLD CALC: 24.9 % — LOW (ref 34.5–45)
HCT VFR BLD CALC: 26.8 % — LOW (ref 34.5–45)
HGB BLD-MCNC: 7.5 G/DL — LOW (ref 11.5–15.5)
HGB BLD-MCNC: 7.9 G/DL — LOW (ref 11.5–15.5)
HGB BLD-MCNC: 8.7 G/DL — LOW (ref 11.5–15.5)
IMM GRANULOCYTES NFR BLD AUTO: 0.5 % — SIGNIFICANT CHANGE UP (ref 0–1.5)
KETONES UR-MCNC: NEGATIVE — SIGNIFICANT CHANGE UP
LACTATE SERPL-SCNC: 1.2 MMOL/L — SIGNIFICANT CHANGE UP (ref 0.7–2)
LEUKOCYTE ESTERASE UR-ACNC: ABNORMAL
LYMPHOCYTES # BLD AUTO: 2.61 K/UL — SIGNIFICANT CHANGE UP (ref 1–3.3)
LYMPHOCYTES # BLD AUTO: 23.6 % — SIGNIFICANT CHANGE UP (ref 13–44)
MCHC RBC-ENTMCNC: 26.1 PG — LOW (ref 27–34)
MCHC RBC-ENTMCNC: 26.7 PG — LOW (ref 27–34)
MCHC RBC-ENTMCNC: 30.9 GM/DL — LOW (ref 32–36)
MCHC RBC-ENTMCNC: 31.7 GM/DL — LOW (ref 32–36)
MCV RBC AUTO: 84.1 FL — SIGNIFICANT CHANGE UP (ref 80–100)
MCV RBC AUTO: 84.7 FL — SIGNIFICANT CHANGE UP (ref 80–100)
MONOCYTES # BLD AUTO: 1.26 K/UL — HIGH (ref 0–0.9)
MONOCYTES NFR BLD AUTO: 11.4 % — SIGNIFICANT CHANGE UP (ref 2–14)
NEUTROPHILS # BLD AUTO: 6.95 K/UL — SIGNIFICANT CHANGE UP (ref 1.8–7.4)
NEUTROPHILS NFR BLD AUTO: 62.8 % — SIGNIFICANT CHANGE UP (ref 43–77)
NITRITE UR-MCNC: NEGATIVE — SIGNIFICANT CHANGE UP
NRBC # BLD: 0 /100 WBCS — SIGNIFICANT CHANGE UP (ref 0–0)
NRBC # BLD: 0 /100 WBCS — SIGNIFICANT CHANGE UP (ref 0–0)
PH UR: 7 — SIGNIFICANT CHANGE UP (ref 5–8)
PLATELET # BLD AUTO: 419 K/UL — HIGH (ref 150–400)
PLATELET # BLD AUTO: 475 K/UL — HIGH (ref 150–400)
POTASSIUM SERPL-MCNC: 3.4 MMOL/L — LOW (ref 3.5–5.3)
POTASSIUM SERPL-SCNC: 3.4 MMOL/L — LOW (ref 3.5–5.3)
PROCALCITONIN SERPL-MCNC: 0.13 NG/ML — HIGH (ref 0–0.04)
PROT SERPL-MCNC: 8 G/DL — SIGNIFICANT CHANGE UP (ref 6–8.3)
PROT UR-MCNC: 30 MG/DL
RBC # BLD: 2.87 M/UL — LOW (ref 3.8–5.2)
RBC # BLD: 2.96 M/UL — LOW (ref 3.8–5.2)
RBC # FLD: 15.3 % — HIGH (ref 10.3–14.5)
RBC # FLD: 15.4 % — HIGH (ref 10.3–14.5)
SODIUM SERPL-SCNC: 136 MMOL/L — SIGNIFICANT CHANGE UP (ref 135–145)
SP GR SPEC: 1 — LOW (ref 1.01–1.02)
UROBILINOGEN FLD QL: 8
WBC # BLD: 10.87 K/UL — HIGH (ref 3.8–10.5)
WBC # BLD: 11.07 K/UL — HIGH (ref 3.8–10.5)
WBC # FLD AUTO: 10.87 K/UL — HIGH (ref 3.8–10.5)
WBC # FLD AUTO: 11.07 K/UL — HIGH (ref 3.8–10.5)

## 2022-02-03 PROCEDURE — 93306 TTE W/DOPPLER COMPLETE: CPT | Mod: 26

## 2022-02-03 PROCEDURE — 71045 X-RAY EXAM CHEST 1 VIEW: CPT | Mod: 26

## 2022-02-03 PROCEDURE — 99232 SBSQ HOSP IP/OBS MODERATE 35: CPT

## 2022-02-03 PROCEDURE — 99233 SBSQ HOSP IP/OBS HIGH 50: CPT | Mod: GC

## 2022-02-03 RX ORDER — ATORVASTATIN CALCIUM 80 MG/1
1 TABLET, FILM COATED ORAL
Qty: 30 | Refills: 0
Start: 2022-02-03 | End: 2022-03-04

## 2022-02-03 RX ORDER — APIXABAN 2.5 MG/1
1 TABLET, FILM COATED ORAL
Qty: 60 | Refills: 0
Start: 2022-02-03 | End: 2022-03-04

## 2022-02-03 RX ORDER — CLOPIDOGREL BISULFATE 75 MG/1
1 TABLET, FILM COATED ORAL
Qty: 30 | Refills: 0
Start: 2022-02-03 | End: 2022-03-04

## 2022-02-03 RX ORDER — ACETAMINOPHEN 500 MG
650 TABLET ORAL EVERY 6 HOURS
Refills: 0 | Status: DISCONTINUED | OUTPATIENT
Start: 2022-02-03 | End: 2022-02-05

## 2022-02-03 RX ORDER — POTASSIUM CHLORIDE 20 MEQ
40 PACKET (EA) ORAL EVERY 4 HOURS
Refills: 0 | Status: COMPLETED | OUTPATIENT
Start: 2022-02-03 | End: 2022-02-03

## 2022-02-03 RX ORDER — APIXABAN 2.5 MG/1
10 TABLET, FILM COATED ORAL EVERY 12 HOURS
Refills: 0 | Status: DISCONTINUED | OUTPATIENT
Start: 2022-02-03 | End: 2022-02-05

## 2022-02-03 RX ADMIN — HEPARIN SODIUM 2200 UNIT(S)/HR: 5000 INJECTION INTRAVENOUS; SUBCUTANEOUS at 11:06

## 2022-02-03 RX ADMIN — Medication 650 MILLIGRAM(S): at 17:26

## 2022-02-03 RX ADMIN — Medication 40 MILLIEQUIVALENT(S): at 09:15

## 2022-02-03 RX ADMIN — LIDOCAINE 1 PATCH: 4 CREAM TOPICAL at 21:09

## 2022-02-03 RX ADMIN — APIXABAN 10 MILLIGRAM(S): 2.5 TABLET, FILM COATED ORAL at 17:26

## 2022-02-03 RX ADMIN — Medication 40 MILLIEQUIVALENT(S): at 11:12

## 2022-02-03 RX ADMIN — HEPARIN SODIUM 2200 UNIT(S)/HR: 5000 INJECTION INTRAVENOUS; SUBCUTANEOUS at 07:06

## 2022-02-03 RX ADMIN — LIDOCAINE 1 PATCH: 4 CREAM TOPICAL at 09:16

## 2022-02-03 RX ADMIN — CLOPIDOGREL BISULFATE 75 MILLIGRAM(S): 75 TABLET, FILM COATED ORAL at 09:16

## 2022-02-03 RX ADMIN — HEPARIN SODIUM 2200 UNIT(S)/HR: 5000 INJECTION INTRAVENOUS; SUBCUTANEOUS at 03:59

## 2022-02-03 RX ADMIN — PANTOPRAZOLE SODIUM 40 MILLIGRAM(S): 20 TABLET, DELAYED RELEASE ORAL at 05:32

## 2022-02-03 RX ADMIN — Medication 150 MICROGRAM(S): at 05:32

## 2022-02-03 RX ADMIN — ATORVASTATIN CALCIUM 10 MILLIGRAM(S): 80 TABLET, FILM COATED ORAL at 21:07

## 2022-02-03 RX ADMIN — LIDOCAINE 1 PATCH: 4 CREAM TOPICAL at 20:58

## 2022-02-03 NOTE — PROGRESS NOTE ADULT - SUBJECTIVE AND OBJECTIVE BOX
Name: YU RED  MRN: [id number]  LOCATION: \[facility]\"" TELN 341 W1    ----  Patient is a 51y old  Female who presents with a chief complaint of R above the knee DVT (03 Feb 2022 12:13)      FROM ADMISSION H+P:   HPI:  52 yo female with PMHx of CAD s/p POBA to OM1 followed by PCI c/w SCAD, hypothyroidism, former smoker presents to the ED for R upper leg pain. Patient states she noticed ecchymosis of R upper thigh since past few days with no associated pain, but slight discomfort. Patient states she never noticed this before and decided to wait and noticed that her bruise was getting bigger and more uncomfortable. Patient states she took Tylenol for slight discomfort. Patient reports feeling R shoulder pain and states she feels like she pulled a muscle while sitting upright. States she had x-rays done at [facility] during her last hospitalization and results were normal. Otherwise patient denies dizziness, headache, chest pain, SOB, abdominal pain, n/v/d.   Of note patient underwent cardiac cath for nstemi at [facility] on 1/14/22, with no complications and patient follows with Dr. Eliza gordon.     ----  INTERVAL HPI/OVERNIGHT EVENTS: Pt seen and evaluated at the bedside. No acute overnight events occurred. Pt developed fever this afternoon to 102.5F. Fever workup initiated. Pt reports feeling achy and experiencing chills. No dysuria, urgency or frequency. She's has a mild cough today which is worse when lying flat. No other new complaints or concerns toda. no skin rashes or itching/burning. no flank pain. no suprapubic pain.     ----  PAST MEDICAL & SURGICAL HISTORY:  Hypothyroidism    CAD (coronary artery disease)    NSTEMI (non-ST elevation myocardial infarction)    S/P cardiac catheterization  &amp; balloon angioplasty with thrombotic lesion in OM1        FAMILY HISTORY:  FH: type 2 diabetes (Mother)    FH: HTN (hypertension) (Mother)    FH: hyperlipidemia (Mother)        Allergies    No Known Allergies    Intolerances        ----  ANTIMICROBIALS:    CARDIOVASCULAR:  lisinopril 2.5 milliGRAM(s) Oral daily  metoprolol succinate ER 25 milliGRAM(s) Oral daily    GASTROINTESTINAL:  pantoprazole    Tablet 40 milliGRAM(s) Oral before breakfast    PULMONARY:      ----  REVIEW OF SYSTEMS:  as per HPI    ----  PHYSICAL EXAM:  GENERAL: NAD, resting comfortably, having rigors  HEENT:  anicteric, moist mucous membranes  CHEST/LUNG:  b/l breath sounds diminished but clear, no rales, wheezes, or rhonchi  HEART:  tachy, S1, S2, no murmurs, rubs, or gallops  ABDOMEN:  soft, nontender, nondistended  EXTREMITIES: no pedal edema, cyanosis, or calf tenderness  NERVOUS SYSTEM: answers questions and follows commands appropriately    T(C): 39.2 (02-03-22 @ 17:27), Max: 39.2 (02-03-22 @ 17:27)  HR: 93 (02-03-22 @ 11:07) (86 - 100)  BP: 102/69 (02-03-22 @ 11:07) (101/67 - 117/74)  RR: 20 (02-03-22 @ 11:07) (18 - 20)  SpO2: 93% (02-03-22 @ 11:07) (93% - 93%)  Wt(kg): --    ----  INTAKE & OUTPUT:  I&O's Summary    02 Feb 2022 07:01  -  03 Feb 2022 07:00  --------------------------------------------------------  IN: 246 mL / OUT: 0 mL / NET: 246 mL        LABS:                        8.7    x     )-----------( x        ( 03 Feb 2022 13:29 )             26.8     02-03    136  |  105  |  8   ----------------------------<  106<H>  3.4<L>   |  24  |  0.79    Ca    8.4<L>      03 Feb 2022 03:38  Phos  2.7     02-02  Mg     2.1     02-02    TPro  8.0  /  Alb  2.4<L>  /  TBili  0.7  /  DBili  x   /  AST  12<L>  /  ALT  19  /  AlkPhos  88  02-03    PTT - ( 03 Feb 2022 10:15 )  PTT:63.8 sec    CAPILLARY BLOOD GLUCOSE

## 2022-02-03 NOTE — PROGRESS NOTE ADULT - SUBJECTIVE AND OBJECTIVE BOX
[facility] -- Liz Dockery, Albert Santos, Kit Garcia Sav[place], [place]  Office # [phone]    Follow Up:  CAD s/p resent stent, DVT/PE    Subjective/Observations: Awake and alert denies further pericarditic or pleuritic pain.  Denies any form of respiratory or cardiac discomfort. Ambulating with no discomfort.  Denies any bleeding    REVIEW OF SYSTEMS: All other review of systems is negative unless indicated above  PAST MEDICAL & SURGICAL HISTORY:  Hypothyroidism    CAD (coronary artery disease)    NSTEMI (non-ST elevation myocardial infarction)    S/P cardiac catheterization  &amp; balloon angioplasty with thrombotic lesion in OM1    MEDICATIONS  (STANDING):  atorvastatin 10 milliGRAM(s) Oral at bedtime  clopidogrel Tablet 75 milliGRAM(s) Oral daily  heparin  Infusion. 2000 Unit(s)/Hr (20 mL/Hr) IV Continuous <Continuous>  levothyroxine 150 MICROGram(s) Oral daily  lidocaine   4% Patch 1 Patch Transdermal daily  lisinopril 2.5 milliGRAM(s) Oral daily  metoprolol succinate ER 25 milliGRAM(s) Oral daily  pantoprazole    Tablet 40 milliGRAM(s) Oral before breakfast    MEDICATIONS  (PRN):  acetaminophen     Tablet .. 650 milliGRAM(s) Oral every 8 hours PRN Mild Pain (1 - 3)  heparin   Injectable 9000 Unit(s) IV Push every 6 hours PRN For aPTT less than 40  heparin   Injectable 4500 Unit(s) IV Push every 6 hours PRN For aPTT between 40 - 57  ondansetron Injectable 4 milliGRAM(s) IV Push every 6 hours PRN Nausea and/or Vomiting    Allergies    No Known Allergies    Intolerances    Vital Signs Last 24 Hrs  T(C): 37.3 (03 Feb 2022 11:07), Max: 37.4 (02 Feb 2022 20:15)  T(F): 99.2 (03 Feb 2022 11:07), Max: 99.3 (02 Feb 2022 20:15)  HR: 93 (03 Feb 2022 11:07) (86 - 100)  BP: 102/69 (03 Feb 2022 11:07) (101/67 - 117/74)  BP(mean): --  RR: 20 (03 Feb 2022 11:07) (18 - 20)  SpO2: 93% (03 Feb 2022 11:07) (93% - 93%)  I&O's Summary    02 Feb 2022 07:01  -  03 Feb 2022 07:00  --------------------------------------------------------  IN: 246 mL / OUT: 0 mL / NET: 246 mL     PHYSICAL EXAM:  TELE: Not on tele  Constitutional: NAD, awake and alert, obese  HEENT: Moist Mucous Membranes, Anicteric  Pulmonary: Non-labored, breath sounds are clear bilaterally but diminished at bases, No wheezing, rales or rhonchi  Cardiovascular: Regular, S1 and S2, No murmurs, rubs, gallops or clicks  Gastrointestinal: Bowel Sounds present, soft, nontender.   Lymph: RLE trace edema. No lymphadenopathy.  Skin: No visible rashes or ulcers.  Psych:  Mood & affect appropriate  LABS: All Labs Reviewed:                        7.9    10.87 )-----------( 475      ( 03 Feb 2022 03:39 )             24.9                         7.5    11.07 )-----------( 419      ( 03 Feb 2022 03:38 )             24.3                         8.4    11.79 )-----------( 468      ( 02 Feb 2022 04:02 )             26.8     03 Feb 2022 03:38    136    |  105    |  8      ----------------------------<  106    3.4     |  24     |  0.79   02 Feb 2022 04:02    135    |  104    |  6      ----------------------------<  111    3.7     |  26     |  0.79   01 Feb 2022 09:03    136    |  103    |  6      ----------------------------<  102    3.4     |  26     |  0.80     Ca    8.4        03 Feb 2022 03:38  Ca    8.4        02 Feb 2022 04:02  Ca    8.5        01 Feb 2022 09:03  Phos  2.7       02 Feb 2022 04:02  Phos  3.1       01 Feb 2022 09:03  Mg     2.1       02 Feb 2022 04:02  Mg     2.3       01 Feb 2022 09:03    TPro  8.0    /  Alb  2.4    /  TBili  0.7    /  DBili  x      /  AST  12     /  ALT  19     /  AlkPhos  88     03 Feb 2022 03:38  TPro  7.9    /  Alb  2.6    /  TBili  0.7    /  DBili  x      /  AST  10     /  ALT  24     /  AlkPhos  82     02 Feb 2022 04:02  TPro  8.0    /  Alb  2.6    /  TBili  1.0    /  DBili  x      /  AST  11     /  ALT  24     /  AlkPhos  78     01 Feb 2022 09:03    PTT - ( 03 Feb 2022 10:15 )  PTT:63.8 sec  CARDIAC MARKERS ( 02 Feb 2022 02:03 )  x     / x     / 80 U/L / x     / <1.0 ng/mL       EXAM:  ECHO TTE WO CON COMP W DOPP         PROCEDURE DATE:  02/03/2022        INTERPRETATION:  INDICATION: Pericardial effusion  Sonographer PH    Blood Pressure 101/67    Height 167 cm     Weight 95.3 kg       BSA 2.0   sq m    Dimensions:  LA 4.7       Normal Values: 2.0 - 4.0 cm  Ao 2.7        Normal Values: 2.0 - 3.8 cm  SEPTUM 1.0       Normal Values: 0.6 - 1.2 cm  PWT 0.9       Normal Values: 0.6 - 1.1 cm  LVIDd 5.2         Normal Values: 3.0 - 5.6 cm  LVIDs 3.7         Normal Values: 1.8 - 4.0 cm      OBSERVATIONS:  Mitral Valve: Mild MR.  Aortic Valve/Aorta: Not well-visualized  Tricuspid Valve: normal with trace TR.  Pulmonic Valve: Not well-visualized  Left Atrium: Enlarged  Right Atrium: Not well-visualized  Left Ventricle: Low-normal left ventricular systolic function, estimated   LVEF of 50-55%. The endocardium is not well-visualized but systolic   function has improved when compared to previous echocardiogram  Right Ventricle: Grossly normal size and systolic function.  Pericardium: Small pericardial effusion without evidence of hemodynamic   compromise.    IMPRESSION:  Low-normal left ventricular systolic function, estimated LVEF of 50-55%.   The endocardium is not well-visualized but systolic function has improved   when compared to previous echocardiogram  Grossly normal RV size and systolic function.  The aortic valve is not well-visualized  Mild MR  Small pericardial effusion without evidence of hemodynamic compromise.    --- End of Report ---      EDGARD CHRISTIANSON MD; Attending Cardiologist  This document has been electronically signed. Feb  3 2022  9:11AM      ACC: [id number] EXAM:  [facility]                          *** ADDENDUM***    This critical value was discussed with Dr. AIDA Mccarty  by telephone at   the time of interpretation on 1/31/2022.    --- End of Report ---    *** END OF ADDENDUM***      PROCEDURE DATE:  01/31/2022          INTERPRETATION:  CLINICAL INFORMATION: Chest pain. Newly diagnosed deep   venous thrombosis.  Evaluate for pulmonary embolism.    COMPARISON: None.    CONTRAST/COMPLICATIONS:  IV Contrast: Omnipaque 350  73 cc administered   27 cc discarded  Oral Contrast: NONE  Complications: None reported at time of study completion    PROCEDURE:  CT Angiography of the Chest.  Sagittal and coronal reformats were performed as well as 3D (MIP)   reconstructions.    FINDINGS:    LUNGS AND AIRWAYS:  PLEURA: Bibasilar dependent atelectatic changes and patchy subpleural   airspace consolidation.  Small left-sided pleural effusion underlying compressive atelectasis.    Atelectatic changes inferior aspect left lingula lobe.    The central airways remain patent.    3 mm subpleural nodule anterior right upper lobe.    MEDIASTINUM AND MICKEY:  Subcentimeter short axis prevascular, right paratracheal and subcarinal   lymph nodes.    VESSELS:  There are filling defects compatible with acute pulmonary emboli   involving segmental right lower lobe pulmonary arterial branches, left   lingula pulmonary artery and superior segment left lower lobe pulmonary   artery and medial basal segment left lower lobe pulmonaryarteries.    HEART: The heart is mildly enlarged, no CT evidence for right-sided heart   strain.  Small to moderate sized pericardial effusion.    CHEST WALL AND LOWER NECK:  Subcentimeter bilateral supraclavicular lymph nodes.  Bilateral axillary lymph nodes measuring up to 1 cm short axis.    VISUALIZED UPPER ABDOMEN:  Scarring upper pole left kidney.    BONES: Within normal limits.    IMPRESSION:    Bilateral pulmonary emboli involving segmental right lower lobe pulmonary   arterial branches, left lingula branch pulmonary artery and segmental   left lower lobe pulmonary arterial branches as discussed.  No CT evidence for right-sided heart strain.    Patchy atelectatic changes lung bases, cannot exclude developing   pulmonary infarct.  Small left-sided pleural effusion.    Small to moderate pericardial effusion, which is increased in size from   CT scan of the abdomen and pelvis performed on 1/30/2022.    Other findings as discussed above.    --- End of Report ---    ***Please see the addendum at the top of this report. It may contain   additional important information or changes.****    USHA GARCIA MD; Attending Radiologist  This document has been electronically signed. Jan 31 2022 11:26AM  Addend:USHA GARCIA MD; Attending Radiologist  This addendum was electronically signed on: Jan 31 2022 11:30AM.      Ventricular Rate 98 BPM    Atrial Rate 98 BPM    P-R Interval 140 ms    QRS Duration 68 ms    Q-T Interval 370 ms    QTC Calculation(Bazett) 472 ms    P Axis 44 degrees    R Axis -11 degrees    T Axis 9 degrees    Diagnosis Line Normal sinus rhythm  Low voltage QRS  Nonspecific T wave abnormality  Prolonged QT  Abnormal ECG  When compared with ECG of 02-FEB-2022 01:34, (Unconfirmed)  No significant change was found  Confirmed by Uzair Santos MD ([id number]) on 2/2/2022 11:20:00 AM

## 2022-02-03 NOTE — PROGRESS NOTE ADULT - PROBLEM SELECTOR PLAN 5
CAD with NSTEMI s/p POBA to OM1 followed by PCI c/w SCAD on 1/13  - following with Dr. Kay outpatient cardiology   - started on Atorvastatin 10mg, Ergocalciferol 1.25, Lisinopril 2.5mg, and Metoprolol Succinate 25mg   - pt does not require triple therapy per conversation with cardiology - full AC + plavix is sufficient, c/w eliquis  - continue home medications with hold parameters  - troponins elevated but downtrending

## 2022-02-03 NOTE — PROGRESS NOTE ADULT - SUBJECTIVE AND OBJECTIVE BOX
Date/Time Patient Seen:  		  Referring MD:   Data Reviewed	       Patient is a 51y old  Female who presents with a chief complaint of R above the knee DVT (02 Feb 2022 16:04)      Subjective/HPI     PAST MEDICAL & SURGICAL HISTORY:  Hypothyroidism    CAD (coronary artery disease)    NSTEMI (non-ST elevation myocardial infarction)    No significant past surgical history    S/P cardiac catheterization  &amp; balloon angioplasty with thrombotic lesion in OM1          Medication list         MEDICATIONS  (STANDING):  atorvastatin 10 milliGRAM(s) Oral at bedtime  clopidogrel Tablet 75 milliGRAM(s) Oral daily  heparin  Infusion. 2000 Unit(s)/Hr (20 mL/Hr) IV Continuous <Continuous>  levothyroxine 150 MICROGram(s) Oral daily  lidocaine   4% Patch 1 Patch Transdermal daily  lisinopril 2.5 milliGRAM(s) Oral daily  metoprolol succinate ER 25 milliGRAM(s) Oral daily  pantoprazole    Tablet 40 milliGRAM(s) Oral before breakfast    MEDICATIONS  (PRN):  acetaminophen     Tablet .. 650 milliGRAM(s) Oral every 8 hours PRN Mild Pain (1 - 3)  heparin   Injectable 9000 Unit(s) IV Push every 6 hours PRN For aPTT less than 40  heparin   Injectable 4500 Unit(s) IV Push every 6 hours PRN For aPTT between 40 - 57  ondansetron Injectable 4 milliGRAM(s) IV Push every 6 hours PRN Nausea and/or Vomiting         Vitals log        ICU Vital Signs Last 24 Hrs  T(C): 36.6 (03 Feb 2022 05:21), Max: 37.4 (02 Feb 2022 20:15)  T(F): 97.8 (03 Feb 2022 05:21), Max: 99.3 (02 Feb 2022 20:15)  HR: 86 (03 Feb 2022 05:21) (86 - 100)  BP: 101/67 (03 Feb 2022 05:21) (101/67 - 117/74)  BP(mean): --  ABP: --  ABP(mean): --  RR: 18 (03 Feb 2022 05:21) (18 - 18)  SpO2: 93% (03 Feb 2022 05:21) (93% - 98%)           Input and Output:  I&O's Detail    01 Feb 2022 07:01  -  02 Feb 2022 07:00  --------------------------------------------------------  IN:    Heparin Infusion: 220 mL  Total IN: 220 mL    OUT:    Voided (mL): 850 mL  Total OUT: 850 mL    Total NET: -630 mL      02 Feb 2022 07:01  -  03 Feb 2022 06:10  --------------------------------------------------------  IN:    Heparin Infusion: 246 mL  Total IN: 246 mL    OUT:  Total OUT: 0 mL    Total NET: 246 mL          Lab Data                        7.9    10.87 )-----------( 475      ( 03 Feb 2022 03:39 )             24.9     02-03    136  |  105  |  8   ----------------------------<  106<H>  3.4<L>   |  24  |  0.79    Ca    8.4<L>      03 Feb 2022 03:38  Phos  2.7     02-02  Mg     2.1     02-02    TPro  8.0  /  Alb  2.4<L>  /  TBili  0.7  /  DBili  x   /  AST  12<L>  /  ALT  19  /  AlkPhos  88  02-03      CARDIAC MARKERS ( 02 Feb 2022 02:03 )  x     / x     / 80 U/L / x     / <1.0 ng/mL  CARDIAC MARKERS ( 01 Feb 2022 09:03 )  x     / x     / 86 U/L / x     / <1.0 ng/mL        Review of Systems	      Objective     Physical Examination    heart s1s2  lung dec BS  abd soft  head nc  verbal  alert  cn grossly int      Pertinent Lab findings & Imaging      Kaitlynn:  NO   Adequate UO     I&O's Detail    01 Feb 2022 07:01  -  02 Feb 2022 07:00  --------------------------------------------------------  IN:    Heparin Infusion: 220 mL  Total IN: 220 mL    OUT:    Voided (mL): 850 mL  Total OUT: 850 mL    Total NET: -630 mL      02 Feb 2022 07:01  -  03 Feb 2022 06:10  --------------------------------------------------------  IN:    Heparin Infusion: 246 mL  Total IN: 246 mL    OUT:  Total OUT: 0 mL    Total NET: 246 mL               Discussed with:     Cultures:	        Radiology

## 2022-02-03 NOTE — PROGRESS NOTE ADULT - ASSESSMENT
50 yo female with PMHx of hypothyroidism, CAD/NSTEMI s/p POBA to OM1 followed by PCI c/w SCAD, former smoker admitted for R acute DVT at greater saphenous vein/common femoral vein junction.    plan for DOAC  LE dopplers - DVT right  VS noted  on RA  Vascular eval noted  DC Planning -     DVT - PE - possible evolving Pulm Infarct - AC (on Heparin gtt) - I carlos alberto - monitor VS and Sat - RV strain eval - ProBNP and CE noted -   pericardial effusion - pericarditis eval - TTE - CT imaging - Cardio f/u - Hemodynamic monitoring -   Covid 19 - positive on JAN 13 - repeat Neg on Jan   CAD HF - cvs rx regimen - cardio f/u  Obesity - weight management - ANUJ eval as outpatient  Ex Smoker - PFT testing as outpatient - Pulm Eval as outpatient for COPD  CTAP: small hemorrhage in the extraperitoneal space of the right pelvis along the right pelvic sidewall without discrete collection - on Hep gtt - serial H and H, Surgery following -   ICU eval noted  monitor VS and HD and Sat

## 2022-02-03 NOTE — PROGRESS NOTE ADULT - PROBLEM SELECTOR PLAN 2
- pt with chest pain and elevated D-dimer found to have b/l PE on CTA without CT evidence of right heart strain  - start eliquis today  - chest pain may also be related to post-MI pericarditis given pericardial effusion - pain has been occurring nightly but resolved in daytime, possibly positional from sleeping. pain relieved from colchicine last night. AVOID NSAIDs given full AC + plavix  - pulm consult

## 2022-02-03 NOTE — PHARMACOTHERAPY INTERVENTION NOTE - COMMENTS
Patient enrolled in [facility] and the following prescriptions were filled at [facility]:    Eliquis Starter Pack for Treatment of DVT and PE 5 mg oral tablet    Brought to bedside and counseled on indication, directions and side effects of medications. Patient verbalized understanding and had no further questions.

## 2022-02-03 NOTE — PROGRESS NOTE ADULT - ASSESSMENT
51 F with CAD s/p recent stent to OM1 c/w SCAD, and hypothyroidism p/w right upper thigh ecchymosis that she noticed 2 days ago.  Denies dizziness or lightheadedness.  Denies numbness or tingling of RLE. Patient had recently had a cardiac cath on 1/13 and 14 using an angioseal with no complication on exit site of catheter.      CAD S/P recent OM1 angioplasty  - S/P 1/13 NSTEMI with thrombotic lesion in OM1 S/P POBA p/w right thigh ecchymosis   - 1/30 LE Doppler Acute Right DVT, heparin gtt initiated  - CTAP: small hemorrhage in the extraperitoneal space of the right pelvis along the right pelvic sidewall without discrete collection, and small pericardial effusion surgery following ok with heparin gtt, no bolus  - Had CP consistent with pericarditis.  s/p Colchicine with resolution of symptoms    Provoked DVT/PE with recent Covid Pna  - D-Dimer 1035, CTPA: b/l PE's  with small to moderate pericardial effusion without evidence of R heart strain  - TTE: RV not well visualized mild LV dysfunction EF 45% essentially unchanged small pericardial effusion.  Repeat showed small pericardiac effusion with no evidence of cardiac tamponade  - Continue Heparin gtt IV for AC transition to Eliquis.  However, noted with dropping Hgb to 7.5; repeat of 7.9.  No obvious s/s bleeding  - Given that she had SCAD POBA without PCI, and needs FD AC, we do not need triple therapy  - Continue Plavix, BB, and statin  - BP stable and controlled, continue Lisinopril    - Monitor and replete lytes, keep K>4, Mg>2.  - Will continue to follow.    Janna White DNP, NP-C  Cardiology   Spectra #[phone]

## 2022-02-04 LAB
CK MB BLD-MCNC: <2.1 % — SIGNIFICANT CHANGE UP (ref 0–3.5)
CK MB CFR SERPL CALC: <1 NG/ML — SIGNIFICANT CHANGE UP (ref 0–3.6)
CK SERPL-CCNC: 48 U/L — SIGNIFICANT CHANGE UP (ref 26–192)
HCT VFR BLD CALC: 25.6 % — LOW (ref 34.5–45)
HGB BLD-MCNC: 8.2 G/DL — LOW (ref 11.5–15.5)
MCHC RBC-ENTMCNC: 26.5 PG — LOW (ref 27–34)
MCHC RBC-ENTMCNC: 32 GM/DL — SIGNIFICANT CHANGE UP (ref 32–36)
MCV RBC AUTO: 82.8 FL — SIGNIFICANT CHANGE UP (ref 80–100)
NRBC # BLD: 0 /100 WBCS — SIGNIFICANT CHANGE UP (ref 0–0)
PLATELET # BLD AUTO: 463 K/UL — HIGH (ref 150–400)
RBC # BLD: 3.09 M/UL — LOW (ref 3.8–5.2)
RBC # FLD: 15.5 % — HIGH (ref 10.3–14.5)
TROPONIN I, HIGH SENSITIVITY RESULT: 374 NG/L — HIGH
WBC # BLD: 11.65 K/UL — HIGH (ref 3.8–10.5)
WBC # FLD AUTO: 11.65 K/UL — HIGH (ref 3.8–10.5)

## 2022-02-04 PROCEDURE — 99232 SBSQ HOSP IP/OBS MODERATE 35: CPT

## 2022-02-04 PROCEDURE — 99233 SBSQ HOSP IP/OBS HIGH 50: CPT | Mod: GC

## 2022-02-04 PROCEDURE — 93010 ELECTROCARDIOGRAM REPORT: CPT | Mod: 76

## 2022-02-04 RX ORDER — HYDROMORPHONE HYDROCHLORIDE 2 MG/ML
0.5 INJECTION INTRAMUSCULAR; INTRAVENOUS; SUBCUTANEOUS ONCE
Refills: 0 | Status: DISCONTINUED | OUTPATIENT
Start: 2022-02-04 | End: 2022-02-04

## 2022-02-04 RX ORDER — COLCHICINE 0.6 MG
0.6 TABLET ORAL EVERY 12 HOURS
Refills: 0 | Status: DISCONTINUED | OUTPATIENT
Start: 2022-02-04 | End: 2022-02-05

## 2022-02-04 RX ORDER — CELECOXIB 200 MG/1
200 CAPSULE ORAL ONCE
Refills: 0 | Status: COMPLETED | OUTPATIENT
Start: 2022-02-04 | End: 2022-02-04

## 2022-02-04 RX ADMIN — CELECOXIB 200 MILLIGRAM(S): 200 CAPSULE ORAL at 17:16

## 2022-02-04 RX ADMIN — Medication 650 MILLIGRAM(S): at 15:20

## 2022-02-04 RX ADMIN — LISINOPRIL 2.5 MILLIGRAM(S): 2.5 TABLET ORAL at 04:26

## 2022-02-04 RX ADMIN — PANTOPRAZOLE SODIUM 40 MILLIGRAM(S): 20 TABLET, DELAYED RELEASE ORAL at 04:26

## 2022-02-04 RX ADMIN — Medication 0.6 MILLIGRAM(S): at 22:19

## 2022-02-04 RX ADMIN — Medication 25 MILLIGRAM(S): at 04:26

## 2022-02-04 RX ADMIN — CLOPIDOGREL BISULFATE 75 MILLIGRAM(S): 75 TABLET, FILM COATED ORAL at 11:52

## 2022-02-04 RX ADMIN — Medication 650 MILLIGRAM(S): at 04:27

## 2022-02-04 RX ADMIN — Medication 150 MICROGRAM(S): at 04:27

## 2022-02-04 RX ADMIN — APIXABAN 10 MILLIGRAM(S): 2.5 TABLET, FILM COATED ORAL at 04:26

## 2022-02-04 RX ADMIN — ATORVASTATIN CALCIUM 10 MILLIGRAM(S): 80 TABLET, FILM COATED ORAL at 22:19

## 2022-02-04 RX ADMIN — Medication 650 MILLIGRAM(S): at 05:27

## 2022-02-04 RX ADMIN — Medication 650 MILLIGRAM(S): at 17:17

## 2022-02-04 RX ADMIN — Medication 0.6 MILLIGRAM(S): at 12:05

## 2022-02-04 RX ADMIN — APIXABAN 10 MILLIGRAM(S): 2.5 TABLET, FILM COATED ORAL at 17:16

## 2022-02-04 NOTE — CHART NOTE - NSCHARTNOTEFT_GEN_A_CORE
Called by RN for chest pain. Patient seen and examined at bedside. States that she is having chest pressure 10/10 in intensity nonradiating. Patient has had episodes of chest pain and pressure throughout her hospital course but insists that this pressure is different to what she has ever experienced. She states pain is exacerbated while laying down or inspiration. Denies shortness of breath, nausea, vomiting, swelling of legs. On physical exam of the heart RRR +S1 +s2, lung cta b/l. Ordered stat cardiac enzymes and ECG. RN to call for any changes Called by RN for chest pain. Patient seen and examined at bedside. States that she is having chest pressure 10/10 in intensity nonradiating. Patient has had episodes of chest pain and pressure throughout her hospital course but insists that this pressure is different to what she has ever experienced. She states pain is exacerbated while laying down or inspiration. Denies shortness of breath, nausea, vomiting, swelling of legs. On physical exam of the heart RRR +S1 +s2, lung cta b/l. Ordered stat cardiac enzymes and ECG. RN to call for any changes    ADDENDUM 0601  ECG at bedside does not show significant changes from prior ECG 2/2/22. Patient was given her PRN tylenol Called by RN for chest pain. Patient seen and examined at bedside. States that she is having chest pressure 10/10 in intensity nonradiating. Patient has had episodes of chest pain and pressure throughout her hospital course but insists that this pressure is different to what she has ever experienced. She states pain is exacerbated while laying down or inspiration. Denies shortness of breath, nausea, vomiting, swelling of legs. On physical exam of the heart RRR +S1 +s2, lung cta b/l. Ordered stat cardiac enzymes and ECG. RN to call for any changes    ADDENDUM 0601  ECG at bedside does not show significant changes from prior ECG 2/2/22.  Patient troponin is 374, downtrending throughout her hospital stay. Patient was given her PRN Tylenol for the chest pain. RN to call for any changes or worsening pain.

## 2022-02-04 NOTE — PROGRESS NOTE ADULT - PROBLEM SELECTOR PROBLEM 8
Need for prophylactic measure
Need for prophylactic measure
Hypothyroidism
Need for prophylactic measure
Hypothyroidism

## 2022-02-04 NOTE — PROGRESS NOTE ADULT - PROBLEM SELECTOR PLAN 7
Chronic, on Synthroid 150mcg daily at home  - will continue home medication
Hbg on admission 9.3, with baseline of around 12. Hb dropped from 9.2 on 1/31 to 8.5 on 2/1 but remained stable at 8.5 on afternoon repeat. no gross evidence or signs/symptoms of bleeding. pt hemodynamically stable.  - CT abd on admission: Small hemorrhage in the extraperitoneal space of the right pelvis along the right pelvic sidewall without discrete collection .Small pericardial effusion with concern for mild pericardial enhancement which may be infectious or inflammatory. A 1.6 cm indeterminate left hepatic lobe lesion.  - no acute hematoma on physical exam   - will monitor H/H closely for any acute drop - pt high risk for bleed given heparin gtt tx for bilateral PE  - iron studies - total iron 19, TIBC wnl, ferritin elevated 297
Patient complaining of L shoulder pain from [facility] when she pulled on something  - X-ray L shoulder 1/13 w/ no acute findings   - Uses Tylenol and heating pad at home   - Will continue Tylenol PRN for mild pain and start on Lidocaine patch
Chronic, on Synthroid 150mcg daily at home  - will continue home medication
Chronic, on Synthroid 150mcg daily at home  - will continue home medication

## 2022-02-04 NOTE — PROGRESS NOTE ADULT - SUBJECTIVE AND OBJECTIVE BOX
Date/Time Patient Seen:  		  Referring MD:   Data Reviewed	       Patient is a 51y old  Female who presents with a chief complaint of R above the knee DVT (03 Feb 2022 17:55)      Subjective/HPI     PAST MEDICAL & SURGICAL HISTORY:  Hypothyroidism    CAD (coronary artery disease)    NSTEMI (non-ST elevation myocardial infarction)    No significant past surgical history    S/P cardiac catheterization  &amp; balloon angioplasty with thrombotic lesion in OM1          Medication list         MEDICATIONS  (STANDING):  apixaban 10 milliGRAM(s) Oral every 12 hours  atorvastatin 10 milliGRAM(s) Oral at bedtime  clopidogrel Tablet 75 milliGRAM(s) Oral daily  levothyroxine 150 MICROGram(s) Oral daily  lidocaine   4% Patch 1 Patch Transdermal daily  lisinopril 2.5 milliGRAM(s) Oral daily  metoprolol succinate ER 25 milliGRAM(s) Oral daily  pantoprazole    Tablet 40 milliGRAM(s) Oral before breakfast    MEDICATIONS  (PRN):  acetaminophen     Tablet .. 650 milliGRAM(s) Oral every 8 hours PRN Mild Pain (1 - 3)  acetaminophen     Tablet .. 650 milliGRAM(s) Oral every 6 hours PRN Temp greater or equal to 38C (100.4F)  ondansetron Injectable 4 milliGRAM(s) IV Push every 6 hours PRN Nausea and/or Vomiting         Vitals log        ICU Vital Signs Last 24 Hrs  T(C): 37.3 (04 Feb 2022 04:11), Max: 39.2 (03 Feb 2022 17:27)  T(F): 99.2 (04 Feb 2022 04:11), Max: 102.5 (03 Feb 2022 17:27)  HR: 95 (04 Feb 2022 04:11) (69 - 102)  BP: 113/76 (04 Feb 2022 04:11) (102/69 - 113/76)  BP(mean): --  ABP: --  ABP(mean): --  RR: 18 (04 Feb 2022 04:11) (17 - 20)  SpO2: 96% (04 Feb 2022 04:11) (93% - 96%)           Input and Output:  I&O's Detail    02 Feb 2022 07:01  -  03 Feb 2022 07:00  --------------------------------------------------------  IN:    Heparin Infusion: 246 mL  Total IN: 246 mL    OUT:  Total OUT: 0 mL    Total NET: 246 mL          Lab Data                        8.2    11.65 )-----------( 463      ( 04 Feb 2022 05:46 )             25.6     02-03    136  |  105  |  8   ----------------------------<  106<H>  3.4<L>   |  24  |  0.79    Ca    8.4<L>      03 Feb 2022 03:38    TPro  8.0  /  Alb  2.4<L>  /  TBili  0.7  /  DBili  x   /  AST  12<L>  /  ALT  19  /  AlkPhos  88  02-03      CARDIAC MARKERS ( 04 Feb 2022 05:46 )  x     / x     / 48 U/L / x     / <1.0 ng/mL        Review of Systems	      Objective     Physical Examination  heart s1s2  lung dec BS  abd soft  head nc        Pertinent Lab findings & Imaging      Kaitlynn:  NO   Adequate UO     I&O's Detail    02 Feb 2022 07:01  -  03 Feb 2022 07:00  --------------------------------------------------------  IN:    Heparin Infusion: 246 mL  Total IN: 246 mL    OUT:  Total OUT: 0 mL    Total NET: 246 mL               Discussed with:     Cultures:	        Radiology

## 2022-02-04 NOTE — PROGRESS NOTE ADULT - PROBLEM SELECTOR PLAN 4
CAD with NSTEMI s/p POBA to OM1 followed by PCI c/w SCAD on 1/13  - following with Dr. Kay outpatient cardiology   - started on ASA 81mg, Atorvastatin 10mg, Ergocalciferol 1.25, Lisinopril 2.5mg, and Metoprolol Succinate 25mg   - continue home medications with hold parameters  - troponins elevated but downtrending
CAD with NSTEMI s/p POBA to OM1 followed by PCI c/w SCAD on 1/13  - following with Dr. Kay outpatient cardiology   - started on ASA 81mg, Atorvastatin 10mg, Ergocalciferol 1.25, Lisinopril 2.5mg, and Metoprolol Succinate 25mg   - continue home medications with hold parameters  - troponins elevated but downtrending
repeat doppler essentially unchanged  - management as above
CAD with NSTEMI s/p POBA to OM1 followed by PCI c/w SCAD on 1/13  - following with Dr. Kay outpatient cardiology   - started on Atorvastatin 10mg, Ergocalciferol 1.25, Lisinopril 2.5mg, and Metoprolol Succinate 25mg   - pt does not require triple therapy per conversation with cardiology - full AC + plavix is sufficient. pt will be dc on eliquis which is being started tonight.  - continue home medications with hold parameters  - troponins elevated but downtrending
repeat doppler essentially unchanged  - management as above

## 2022-02-04 NOTE — PROGRESS NOTE ADULT - SUBJECTIVE AND OBJECTIVE BOX
Patient is a 51y old  Female who presents with a chief complaint of R above the knee DVT (2022 10:56)      INTERVAL HPI/OVERNIGHT EVENTS: Patient seen and examined at bedside. Patient bridged from heparin gtt to eliquis starter pack yesterday. Patient spiked fever yesterday Tmax 102.5F. Patient also had worsening chest pressure last night which is mildly improved this morning but persistent. Denies headache, dyspnea, abdominal pain, n/v/d/c. Patient also reporting small amounts of blood on toilet paper when she uses the bathroom.    MEDICATIONS  (STANDING):  apixaban 10 milliGRAM(s) Oral every 12 hours  atorvastatin 10 milliGRAM(s) Oral at bedtime  clopidogrel Tablet 75 milliGRAM(s) Oral daily  colchicine 0.6 milliGRAM(s) Oral every 12 hours  levothyroxine 150 MICROGram(s) Oral daily  lidocaine   4% Patch 1 Patch Transdermal daily  lisinopril 2.5 milliGRAM(s) Oral daily  metoprolol succinate ER 25 milliGRAM(s) Oral daily  pantoprazole    Tablet 40 milliGRAM(s) Oral before breakfast    MEDICATIONS  (PRN):  acetaminophen     Tablet .. 650 milliGRAM(s) Oral every 8 hours PRN Mild Pain (1 - 3)  acetaminophen     Tablet .. 650 milliGRAM(s) Oral every 6 hours PRN Temp greater or equal to 38C (100.4F)  ondansetron Injectable 4 milliGRAM(s) IV Push every 6 hours PRN Nausea and/or Vomiting      Allergies    No Known Allergies    Intolerances        REVIEW OF SYSTEMS:  CONSTITUTIONAL: No fever or chills  HEENT:  No headache  RESPIRATORY: No shortness of breath  CARDIOVASCULAR: No chest pain  GASTROINTESTINAL: No abd pain, nausea, vomiting  GENITOURINARY: No dysuria    Vital Signs Last 24 Hrs  T(C): 36.9 (2022 11:32), Max: 39.2 (2022 17:27)  T(F): 98.5 (2022 11:32), Max: 102.5 (2022 17:27)  HR: 90 (2022 11:32) (69 - 102)  BP: 101/66 (2022 11:32) (101/66 - 113/76)  BP(mean): --  RR: 20 (2022 11:32) (17 - 20)  SpO2: 98% (2022 11:32) (94% - 98%)    PHYSICAL EXAM:  GENERAL: NAD, resting comfortably  HEENT:  anicteric, moist mucous membranes  CHEST/LUNG:  b/l breath sounds diminished but clear, no rales, wheezes, or rhonchi  HEART:  tachy, S1, S2, no murmurs, rubs, or gallops  ABDOMEN:  soft, nontender, nondistended  EXTREMITIES: no pedal edema, cyanosis, or calf tenderness  NERVOUS SYSTEM: answers questions and follows commands appropriately    LABS:                        8.2    11.65 )-----------( 463      ( 2022 05:46 )             25.6     CBC Full  -  ( 2022 05:46 )  WBC Count : 11.65 K/uL  Hemoglobin : 8.2 g/dL  Hematocrit : 25.6 %  Platelet Count - Automated : 463 K/uL  Mean Cell Volume : 82.8 fl  Mean Cell Hemoglobin : 26.5 pg  Mean Cell Hemoglobin Concentration : 32.0 gm/dL  Auto Neutrophil # : x  Auto Lymphocyte # : x  Auto Monocyte # : x  Auto Eosinophil # : x  Auto Basophil # : x  Auto Neutrophil % : x  Auto Lymphocyte % : x  Auto Monocyte % : x  Auto Eosinophil % : x  Auto Basophil % : x      Ca    8.4        2022 03:38      PTT - ( 2022 17:33 )  PTT:33.0 sec  Urinalysis Basic - ( 2022 18:53 )    Color: Yellow / Appearance: Slightly Turbid / S.005 / pH: x  Gluc: x / Ketone: Negative  / Bili: Negative / Urobili: 8   Blood: x / Protein: 30 mg/dL / Nitrite: Negative   Leuk Esterase: Small / RBC: 0-2 /HPF / WBC 6-10   Sq Epi: x / Non Sq Epi: Moderate / Bacteria: Few      CAPILLARY BLOOD GLUCOSE              RADIOLOGY & ADDITIONAL TESTS:    Personally reviewed.     Consultant(s) Notes Reviewed:  [x] YES  [ ] NO

## 2022-02-04 NOTE — PROGRESS NOTE ADULT - PROBLEM SELECTOR PLAN 8
Chronic, on Synthroid 150mcg daily at home  - will continue home medication
VTE ppx: on full dose AC with heparin gtt    incidental liver lesion on CT - f/u on discharge
Chronic, on Synthroid 150mcg daily at home  - will continue home medication

## 2022-02-04 NOTE — PROGRESS NOTE ADULT - ATTENDING COMMENTS
The patient was seen and evaluated independently by the attending physician.  - I have personally reviewed the pt's labs, imaging, micro data and consultant recommendations.    #CP from pericarditis  #DVT / PE  #fever, no clear source  #recent NSTEMI s/p pci  #recent COVID  #possible local lower GI bleed    - c/w therapeutic AC  - colchicine / celebrex prn cp  - monitor h/h closely  - h/o fibroids. h/o hemorrhoids. h/h is down significantly from her chronic baseline of 12-13 but this would be expected given her recnetly complicated medical issues including COVID infection  - supporitve care  - spoke w/ family at bedside. addressed their concerns to theb est of my ability

## 2022-02-04 NOTE — PROVIDER CONTACT NOTE (OTHER) - BACKGROUND
Pt has experienced chest pain throughout course of stay, pt insists this pressure is new and unlike anything she has felt before. Pt states it feels heavy on her chest, nonradiating. Worse when lying.

## 2022-02-04 NOTE — PROGRESS NOTE ADULT - PROBLEM SELECTOR PROBLEM 4
CAD (coronary artery disease)
CAD (coronary artery disease)
Acute deep vein thrombosis (DVT)
CAD (coronary artery disease)
Acute deep vein thrombosis (DVT)

## 2022-02-04 NOTE — PROGRESS NOTE ADULT - PROBLEM/PLAN-5
DISPLAY PLAN FREE TEXT
Orbicularis Oris Muscle Flap Text: The defect edges were debeveled with a #15 scalpel blade.  Given that the defect affected the competency of the oral sphincter an obicularis oris muscle flap was deemed most appropriate to restore this competency and normal muscle function.  Using a sterile surgical marker, an appropriate flap was drawn incorporating the defect. The area thus outlined was incised with a #15 scalpel blade.

## 2022-02-04 NOTE — PROGRESS NOTE ADULT - PROBLEM SELECTOR PLAN 2
- pt with chest pain and elevated D-dimer found to have b/l PE on CTA without CT evidence of right heart strain  - started eliquis yesterday - continue 10mg BID for a total of 7 days followed by 5mg BID  - chest pain may also be related to post-MI pericarditis given pericardial effusion - pain has been occurring nightly but resolved in daytime, possibly positional from sleeping. pain relieved from colchicine last night. AVOID NSAIDs given full AC + plavix  - colchicine prn available  - EKG overnight 2/4 was similar to prior and cardiac enzymes were negative/troponin downtrending  - paient with persistent chest discomfort - unrelieved by colchicine in afternoon -   - pulm consult - pt with chest pain and elevated D-dimer found to have b/l PE on CTA without CT evidence of right heart strain  - started eliquis yesterday - continue 10mg BID for a total of 7 days followed by 5mg BID  - chest pain may also be related to post-MI pericarditis given pericardial effusion - pain has been occurring nightly but resolved in daytime, possibly positional from sleeping. pain relieved from colchicine last night. AVOID NSAIDs given full AC + plavix  - colchicine prn available  - EKG overnight 2/4 was similar to prior and cardiac enzymes were negative/troponin downtrending  - paient with persistent chest discomfort - unrelieved by colchicine in afternoon - will trial celecoxib which should not affect platelets due to HUDSON selectivity  - pulm consult

## 2022-02-04 NOTE — PROGRESS NOTE ADULT - PROBLEM SELECTOR PROBLEM 9
R/O Retroperitoneal bleeding
Need for prophylactic measure
Need for prophylactic measure
R/O Retroperitoneal bleeding
R/O Retroperitoneal bleeding

## 2022-02-04 NOTE — PROGRESS NOTE ADULT - PROBLEM SELECTOR PROBLEM 3
Acute deep vein thrombosis (DVT)
Pericarditis
Acute deep vein thrombosis (DVT)
Pericarditis
Acute deep vein thrombosis (DVT)

## 2022-02-04 NOTE — PROGRESS NOTE ADULT - PROBLEM SELECTOR PLAN 1
new fever. tachycardic. temp 102.5 oral temp, no leukocytosis  - f/u blood cultures, UA negative, procal unremarkable, f/u urine culture  - check blood cultures, procal, lactate level, u/a, urine culture  - will check cxr as well - Retrocardiac atelectasis/infiltrate/effusion, grossly unchanged.  Mild enlargement of the cardiac silhouette, CT demonstrated a pericardial effusion  - no other localizing symptoms  - will give tylenol and monitor temp curve  - pulmonary infarct from a PE can cause fever  - no abx indicated at this time but will f/u lactate and will assess responsiveness to tylenol
- pt with chest pain and elevated D-dimer found to have bilateral PE on CTA without CT evidence of right heart strain  - pt has been on heparin gtt tonight - transition to eliquis tonight with starting dose 10mg BID x7 days and 5mg BID after that. This is considered a provoked VTE in the setting of recent COVID infection and will likely require therapy for 3-6 months with repeat imaging at 90 days to monitor resolution of clots. DC heparin drip tonight with first dose of eliquis to be given within 30 min of gtt stopping.  - ICU consulted, pt not ICU candidate at this time  - chest pain may also be related to post-MI pericarditis given pericardial effusion - pain has been occurring nightly but resolved in daytime, possibly positional from sleeping. pain relieved from colchicine last night. AVOID NSAIDs given full AC + plavix  - pulm consult
- pt with chest pain and elevated D-dimer found to have bilateral PE on CTA without CT evidence of right heart strain  - pt on heparin gtt, add plavix per cardio  - ICU consulted, pt not ICU candidate at this time  - pt requires full dose AC  - chest pain may also be related to post-MI pericarditis given pericardial effusion  - pulm consult
new fever. tachycardic. temp 102.5 oral temp, no leukocytosis  - check blood cultures, procal, lactate level, u/a, urine culture  - will check cxr as well  - no other localizing symptoms  - will give tylenol and monitor temp curve  - pulmonary infarct from a PE can cause fever  - no abx indicated at this time but will f/u lactate and will assess responsiveness to tylenol
- pt with chest pain and elevated D-dimer found to have bilateral PE on CTA without CT evidence of right heart strain  - pt on heparin gtt, add plavix per cardio  - ICU consulted, pt not ICU candidate at this time  - pt requires full dose AC  - chest pain may also be related to post-MI pericarditis given pericardial effusion - pain is improved  - pulm consult

## 2022-02-04 NOTE — PROGRESS NOTE ADULT - PROBLEM SELECTOR PLAN 9
VTE ppx: on full dose AC with eliquis    incidental liver lesion on CT - f/u on discharge
- CT abd/pelvis with IV contrast on admission: Small hemorrhage in the extraperitoneal space of the right pelvis along the right pelvic sidewall without discrete collection. Small pericardial effusion with concern for mild pericardial enhancement which may be infectious or inflammatory. A 1.6 cm indeterminate left hepatic lobe lesion.   - likely all post-operative changes but will proceed with heparin gtt instead of LMWH  - will monitor HH closely for any acute drop  - Cardio consulted, recs appreciated  - Vascular consulted, recs appreciated
- CT abd/pelvis with IV contrast on admission: Small hemorrhage in the extraperitoneal space of the right pelvis along the right pelvic sidewall without discrete collection. Small pericardial effusion with concern for mild pericardial enhancement which may be infectious or inflammatory. A 1.6 cm indeterminate left hepatic lobe lesion.   - likely all post-operative changes but will proceed with heparin gtt instead of LMWH  - will monitor HH closely for any acute drop  - Cardio consulted, recs appreciated  - Vascular consulted, recs appreciated
VTE ppx: on full dose AC with eliquis    incidental liver lesion on CT - f/u on discharge
- CT abd/pelvis with IV contrast on admission: Small hemorrhage in the extraperitoneal space of the right pelvis along the right pelvic sidewall without discrete collection. Small pericardial effusion with concern for mild pericardial enhancement which may be infectious or inflammatory. A 1.6 cm indeterminate left hepatic lobe lesion.   - likely all post-operative changes but will proceed with heparin gtt instead of LMWH  - will monitor HH closely for any acute drop  - Cardio consulted, recs appreciated  - Vascular consulted, recs appreciated

## 2022-02-04 NOTE — PROGRESS NOTE ADULT - PROBLEM SELECTOR PLAN 10
- CT abd/pelvis with IV contrast on admission: Small hemorrhage in the extraperitoneal space of the right pelvis along the right pelvic sidewall without discrete collection. Small pericardial effusion with concern for mild pericardial enhancement which may be infectious or inflammatory. A 1.6 cm indeterminate left hepatic lobe lesion.   - likely all post-operative changes but will proceed with heparin gtt instead of LMWH  - will monitor HH closely for any acute drop  - Cardio consulted, recs appreciated  - Vascular consulted, recs appreciated
- CT abd/pelvis with IV contrast on admission: Small hemorrhage in the extraperitoneal space of the right pelvis along the right pelvic sidewall without discrete collection. Small pericardial effusion with concern for mild pericardial enhancement which may be infectious or inflammatory. A 1.6 cm indeterminate left hepatic lobe lesion.   - likely all post-operative changes but will proceed with heparin gtt instead of LMWH  - will monitor HH closely for any acute drop  - Cardio consulted, recs appreciated  - Vascular consulted, recs appreciated

## 2022-02-04 NOTE — PROVIDER CONTACT NOTE (OTHER) - ACTION/TREATMENT ORDERED:
MD ordered STAT EKG completed and provided to MD, STAT cardiac enzymes. Pt received 1x dose PRN Tylenol.

## 2022-02-04 NOTE — PROGRESS NOTE ADULT - ATTENDING COMMENTS
vte on ac  recent pci on plavix, off asa  pericarditis with cp and effusion with soft rub  cont colchicine

## 2022-02-04 NOTE — PROGRESS NOTE ADULT - ASSESSMENT
51 F with CAD s/p recent stent to OM1 c/w SCAD, and hypothyroidism p/w right upper thigh ecchymosis that she noticed 2 days ago.  Denies dizziness or lightheadedness.  Denies numbness or tingling of RLE. Patient had recently had a cardiac cath on 1/13 and 14 using an angioseal with no complication on exit site of catheter.      CAD S/P recent OM1 angioplasty  - S/P 1/13 NSTEMI with thrombotic lesion in OM1 S/P POBA p/w right thigh ecchymosis   - 1/30 LE Doppler Acute Right DVT, heparin gtt initiated  - CTAP: small hemorrhage in the extraperitoneal space of the right pelvis along the right pelvic sidewall without discrete collection, and small pericardial effusion surgery following ok with heparin gtt, no bolus  - Had CP consistent with pericarditis.  s/p Colchicine with resolution of symptoms    Provoked DVT/PE with recent Covid Pna  - D-Dimer 1035, CTPA: b/l PE's  with small to moderate pericardial effusion without evidence of R heart strain  - Continue loading dose of Eliquis to be followed by 5 mg q12H.  Per Primary  - Given that she had SCAD POBA without PCI.  She is at high risk of bleeding with triple therapy.  Hgb dropped to 7.5 but bounced back up with no intervention  - Will keep off of ASA and maintain on Plavix with Eliquis  - Continue BB and statin  - BP stable and controlled, continue Lisinopril    Pericarditis/Pericardia Effusion  - TTE: RV not well visualized mild LV dysfunction EF 45% essentially unchanged small pericardial effusion.  Repeat showed small pericardiac effusion with no evidence of cardiac tamponade  - Start Colchicine .6 mg q12 for pericarditic pain.  c/o pain overnight.  EKG unchanged from previous with no ischemic changes.  No rise in hsT noted  - Continue PPI  - Monitor and replete lytes, keep K>4, Mg>2.    - Will continue to follow.    Janna White DNP, NP-C  Cardiology   Spectra #[phone]     51 F with CAD s/p recent stent to OM1 c/w SCAD, and hypothyroidism p/w right upper thigh ecchymosis that she noticed 2 days ago.  Denies dizziness or lightheadedness.  Denies numbness or tingling of RLE. Patient had recently had a cardiac cath on 1/13 and 14 using an angioseal with no complication on exit site of catheter.      CAD S/P recent OM1 angioplasty  - S/P 1/13 NSTEMI with thrombotic lesion in OM1 S/P POBA p/w right thigh ecchymosis   - Will not resume ASA but continue Plavix  - Continue BB and statin  - BP stable and controlled, continue Lisinopril    Provoked DVT/PE with recent Covid Pna  - D-Dimer 1035, CTPA: b/l PE's  with small to moderate pericardial effusion without evidence of R heart strain  - Continue loading dose of Eliquis to be followed by 5 mg q12H.  Per Primary  - Given that she had SCAD POBA without PCI.  She is at high risk of bleeding with triple therapy.  Hgb dropped to 7.5 but bounced back up with no intervention    Pericarditis/Pericardia Effusion  - TTE: RV not well visualized mild LV dysfunction EF 45% essentially unchanged small pericardial effusion.  Repeat showed small pericardiac effusion with no evidence of cardiac tamponade  - Start Colchicine 0.6 mg q12.  c/o pericarditic pain overnight.  EKG unchanged from previous with no ischemic changes.  No rise in hsT noted  - Continue PPI  - Monitor and replete lytes, keep K>4, Mg>2.    - Will continue to follow.    Janna White DNP, NP-C  Cardiology   Spectra #[phone]

## 2022-02-04 NOTE — PROGRESS NOTE ADULT - TIME BILLING
as above
care coordination, plan of care discussed with patient face to face, 3N IDR team
care coordination, plan of care discussed with patient, Dr Garcia cardio
as above
as above

## 2022-02-04 NOTE — PROVIDER CONTACT NOTE (OTHER) - SITUATION
Pt has extensive cardiac hx, admit for DVT, hx of NSTEMI, CAD, pericarditis, stent placement. Pt has been c/o chest pain throughout hospital stay, states she has new onset chest pressure 10/10

## 2022-02-04 NOTE — PROGRESS NOTE ADULT - ASSESSMENT
52 yo female with PMHx of hypothyroidism, CAD/NSTEMI s/p POBA to OM1 followed by PCI c/w SCAD, former smoker admitted for R acute DVT at greater saphenous vein/common femoral vein junction.    on Eliquis  Fever noted - episodic  VS noted  on RA  overnight c/w CP  trops noted  UA noted     DVT - PE - possible evolving Pulm Infarct - AC (on Heparin gtt) - I carlos alberto - monitor VS and Sat - RV strain eval - ProBNP and CE noted -   pericardial effusion - pericarditis eval - TTE - CT imaging - Cardio f/u - Hemodynamic monitoring -   Covid 19 - positive on JAN 13 - repeat Neg on Jan   CAD HF - cvs rx regimen - cardio f/u  Obesity - weight management - ANUJ eval as outpatient  Ex Smoker - PFT testing as outpatient - Pulm Eval as outpatient for COPD  CTAP: small hemorrhage in the extraperitoneal space of the right pelvis along the right pelvic sidewall without discrete collection - on Hep gtt - serial H and H, Surgery following -   ICU eval noted  monitor VS and HD and Sat

## 2022-02-04 NOTE — PROGRESS NOTE ADULT - PROBLEM SELECTOR PLAN 5
Hbg on admission 9.3, with baseline of around 12. Hb dropped from 9.2 on 1/31 to 8.5 on 2/1 but remained stable at 8.5 on afternoon repeat. no gross evidence or signs/symptoms of bleeding. pt hemodynamically stable.  - CT abd on admission: Small hemorrhage in the extraperitoneal space of the right pelvis along the right pelvic sidewall without discrete collection .Small pericardial effusion with concern for mild pericardial enhancement which may be infectious or inflammatory. A 1.6 cm indeterminate left hepatic lobe lesion.  - no acute hematoma on physical exam   - will monitor H/H closely for any acute drop - pt high risk for bleed given heparin gtt tx for bilateral PE  - iron studies - total iron 19, TIBC wnl, ferritin elevated 297  - pt reporting small amount of blood when wiping with toilet paper - continue to monitor. H/H stable, hemodynamics stable Hbg on admission 9.3, with baseline of around 12. Hb dropped from 9.2 on 1/31 to 8.5 on 2/1 but remained stable at 8.5 on afternoon repeat. no gross evidence or signs/symptoms of bleeding. pt hemodynamically stable.  - CT abd on admission: Small hemorrhage in the extraperitoneal space of the right pelvis along the right pelvic sidewall without discrete collection .Small pericardial effusion with concern for mild pericardial enhancement which may be infectious or inflammatory. A 1.6 cm indeterminate left hepatic lobe lesion.  - no acute hematoma on physical exam   - will monitor H/H closely for any acute drop - pt high risk for bleed given heparin gtt tx for bilateral PE  - iron studies - total iron 19, TIBC wnl, ferritin elevated 297  - pt reporting small amount of blood when wiping with toilet paper - continue to monitor. H/H stable, hemodynamics stable. pt reports personal hx of hemorrhoids and fibroids and is unsure if blood is coming from anus or vagina.

## 2022-02-04 NOTE — PROGRESS NOTE ADULT - PROBLEM SELECTOR PLAN 6
Hbg on admission 9.3, with baseline of around 12. Hb dropped from 9.2 on 1/31 to 8.5 on 2/1 but remained stable at 8.5 on afternoon repeat. no gross evidence or signs/symptoms of bleeding. pt hemodynamically stable.  - CT abd on admission: Small hemorrhage in the extraperitoneal space of the right pelvis along the right pelvic sidewall without discrete collection .Small pericardial effusion with concern for mild pericardial enhancement which may be infectious or inflammatory. A 1.6 cm indeterminate left hepatic lobe lesion.  - no acute hematoma on physical exam   - will monitor H/H closely for any acute drop - pt high risk for bleed given heparin gtt tx for bilateral PE  - iron studies - total iron 19, TIBC wnl, ferritin elevated 297
CAD with NSTEMI s/p POBA to OM1 followed by PCI c/w SCAD on 1/13  - following with Dr. Kay outpatient cardiology   - started on Atorvastatin 10mg, Ergocalciferol 1.25, Lisinopril 2.5mg, and Metoprolol Succinate 25mg   - pt does not require triple therapy per conversation with cardiology - full AC + plavix is sufficient, c/w eliquis  - continue home medications with hold parameters  - troponin elevated but downtrending
Hbg on admission 9.3, with baseline of around 12  - CT abd on admission: Small hemorrhage in the extraperitoneal space of the right pelvis along the right pelvic sidewall without discrete collection .Small pericardial effusion with concern for mild pericardial enhancement which may be infectious or inflammatory. A 1.6 cm indeterminate left hepatic lobe lesion.  - no acute hematoma on physical exam   - will monitor H/H closely for any acute drop - pt high risk for bleed given heparin gtt tx for bilateral PE  - iron studies - total iron 19, TIBC wnl, ferritin elevated 297
Hbg on admission 9.3, with baseline of around 12. Hb dropped from 9.2 on 1/31 to 8.5 on 2/1 but remained stable at 8.5 on afternoon repeat. no gross evidence or signs/symptoms of bleeding. pt hemodynamically stable.  - CT abd on admission: Small hemorrhage in the extraperitoneal space of the right pelvis along the right pelvic sidewall without discrete collection .Small pericardial effusion with concern for mild pericardial enhancement which may be infectious or inflammatory. A 1.6 cm indeterminate left hepatic lobe lesion.  - no acute hematoma on physical exam   - will monitor H/H closely for any acute drop - pt high risk for bleed given heparin gtt tx for bilateral PE  - iron studies - total iron 19, TIBC wnl, ferritin elevated 297
Patient complaining of L shoulder pain from [facility] when she pulled on something  - X-ray L shoulder 1/13 w/ no acute findings   - Uses Tylenol and heating pad at home   - Will continue Tylenol PRN for mild pain and start on Lidocaine patch

## 2022-02-04 NOTE — PROGRESS NOTE ADULT - SUBJECTIVE AND OBJECTIVE BOX
[facility] -- Liz Dockery, Albert Santos, Kit Garcia Savella, Goodger  Office # [phone]    Follow Up:  CAD s/p NSTEMI s/p stent to RPLS, Pericarditis, DVT/PE    Subjective/Observations: Sitting on the chair, comfortable on RA.  Denies SOB, SARAVIA or orthopnea.  However, still c/o non-radiating chest pain that is worse laying flat, consistent with pericarditis.  Overnight events noted    REVIEW OF SYSTEMS: All other review of systems is negative unless indicated above  PAST MEDICAL & SURGICAL HISTORY:  Hypothyroidism    CAD (coronary artery disease)    NSTEMI (non-ST elevation myocardial infarction)    S/P cardiac catheterization  &amp; balloon angioplasty with thrombotic lesion in OM1    MEDICATIONS  (STANDING):  apixaban 10 milliGRAM(s) Oral every 12 hours  atorvastatin 10 milliGRAM(s) Oral at bedtime  clopidogrel Tablet 75 milliGRAM(s) Oral daily  colchicine 0.6 milliGRAM(s) Oral every 12 hours  levothyroxine 150 MICROGram(s) Oral daily  lidocaine   4% Patch 1 Patch Transdermal daily  lisinopril 2.5 milliGRAM(s) Oral daily  metoprolol succinate ER 25 milliGRAM(s) Oral daily  pantoprazole    Tablet 40 milliGRAM(s) Oral before breakfast    MEDICATIONS  (PRN):  acetaminophen     Tablet .. 650 milliGRAM(s) Oral every 8 hours PRN Mild Pain (1 - 3)  acetaminophen     Tablet .. 650 milliGRAM(s) Oral every 6 hours PRN Temp greater or equal to 38C (100.4F)  ondansetron Injectable 4 milliGRAM(s) IV Push every 6 hours PRN Nausea and/or Vomiting    Allergies    No Known Allergies    Intolerances    Vital Signs Last 24 Hrs  T(C): 37.3 (04 Feb 2022 04:11), Max: 39.2 (03 Feb 2022 17:27)  T(F): 99.2 (04 Feb 2022 04:11), Max: 102.5 (03 Feb 2022 17:27)  HR: 95 (04 Feb 2022 04:11) (69 - 102)  BP: 113/76 (04 Feb 2022 04:11) (102/69 - 113/76)  BP(mean): --  RR: 18 (04 Feb 2022 04:11) (17 - 20)  SpO2: 96% (04 Feb 2022 04:11) (93% - 96%)  I&O's Summary      PHYSICAL EXAM:  TELE: Not on tele  Constitutional: NAD, awake and alert, obese  HEENT: Moist Mucous Membranes, Anicteric  Pulmonary: Non-labored, breath sounds are clear bilaterally but diminished, No wheezing, rales or rhonchi  Cardiovascular: Regular, S1 and S2, No murmurs, + jamey rub, no gallops or clicks  Gastrointestinal: Bowel Sounds present, soft, nontender.   Lymph: No peripheral edema. No lymphadenopathy.  Skin: No visible rashes or ulcers.  Psych:  Mood & affect appropriate  LABS: All Labs Reviewed:                        8.2    11.65 )-----------( 463      ( 04 Feb 2022 05:46 )             25.6                         8.7    x     )-----------( x        ( 03 Feb 2022 13:29 )             26.8                         7.9    10.87 )-----------( 475      ( 03 Feb 2022 03:39 )             24.9     03 Feb 2022 03:38    136    |  105    |  8      ----------------------------<  106    3.4     |  24     |  0.79   02 Feb 2022 04:02    135    |  104    |  6      ----------------------------<  111    3.7     |  26     |  0.79     Ca    8.4        03 Feb 2022 03:38  Ca    8.4        02 Feb 2022 04:02  Phos  2.7       02 Feb 2022 04:02  Mg     2.1       02 Feb 2022 04:02    TPro  8.0    /  Alb  2.4    /  TBili  0.7    /  DBili  x      /  AST  12     /  ALT  19     /  AlkPhos  88     03 Feb 2022 03:38  TPro  7.9    /  Alb  2.6    /  TBili  0.7    /  DBili  x      /  AST  10     /  ALT  24     /  AlkPhos  82     02 Feb 2022 04:02    PTT - ( 03 Feb 2022 17:33 )  PTT:33.0 sec  CARDIAC MARKERS ( 04 Feb 2022 05:46 )  x     / x     / 48 U/L / x     / <1.0 ng/mL     EXAM:  ECHO TTE WO CON COMP W DOPP         PROCEDURE DATE:  02/03/2022        INTERPRETATION:  INDICATION: Pericardial effusion  Sonographer PH    Blood Pressure 101/67    Height 167 cm     Weight 95.3 kg       BSA 2.0   sq m    Dimensions:  LA 4.7       Normal Values: 2.0 - 4.0 cm  Ao 2.7        Normal Values: 2.0 - 3.8 cm  SEPTUM 1.0       Normal Values: 0.6 - 1.2 cm  PWT 0.9       Normal Values: 0.6 - 1.1 cm  LVIDd 5.2         Normal Values: 3.0 - 5.6 cm  LVIDs 3.7         Normal Values: 1.8 - 4.0 cm      OBSERVATIONS:  Mitral Valve: Mild MR.  Aortic Valve/Aorta: Not well-visualized  Tricuspid Valve: normal with trace TR.  Pulmonic Valve: Not well-visualized  Left Atrium: Enlarged  Right Atrium: Not well-visualized  Left Ventricle: Low-normal left ventricular systolic function, estimated   LVEF of 50-55%. The endocardium is not well-visualized but systolic   function has improved when compared to previous echocardiogram  Right Ventricle: Grossly normal size and systolic function.  Pericardium: Small pericardial effusion without evidence of hemodynamic   compromise.    IMPRESSION:  Low-normal left ventricular systolic function, estimated LVEF of 50-55%.   The endocardium is not well-visualized but systolic function has improved   when compared to previous echocardiogram  Grossly normal RV size and systolic function.  The aortic valve is not well-visualized  Mild MR  Small pericardial effusion without evidence of hemodynamic compromise.    --- End of Report ---      EDGARD CHRISTIANSON MD; Attending Cardiologist  This document has been electronically signed. Feb  3 2022  9:11AM      ACC: [id number] EXAM:  CT ANGIO CHEST PULM [facility]                          *** ADDENDUM***    This critical value was discussed with Dr. AIDA Mccarty  by telephone at   the time of interpretation on 1/31/2022.    --- End of Report ---    *** END OF ADDENDUM***      PROCEDURE DATE:  01/31/2022          INTERPRETATION:  CLINICAL INFORMATION: Chest pain. Newly diagnosed deep   venous thrombosis.  Evaluate for pulmonary embolism.    COMPARISON: None.    CONTRAST/COMPLICATIONS:  IV Contrast: Omnipaque 350  73 cc administered   27 cc discarded  Oral Contrast: NONE  Complications: None reported at time of study completion    PROCEDURE:  CT Angiography of the Chest.  Sagittal and coronal reformats were performed as well as 3D (MIP)   reconstructions.    FINDINGS:    LUNGS AND AIRWAYS:  PLEURA: Bibasilar dependent atelectatic changes and patchy subpleural   airspace consolidation.  Small left-sided pleural effusion underlying compressive atelectasis.    Atelectatic changes inferior aspect left lingula lobe.    The central airways remain patent.    3 mm subpleural nodule anterior right upper lobe.    MEDIASTINUM AND MICKEY:  Subcentimeter short axis prevascular, right paratracheal and subcarinal   lymph nodes.    VESSELS:  There are filling defects compatible with acute pulmonary emboli   involving segmental right lower lobe pulmonary arterial branches, left   lingula pulmonary artery and superior segment left lower lobe pulmonary   artery and medial basal segment left lower lobe pulmonaryarteries.    HEART: The heart is mildly enlarged, no CT evidence for right-sided heart   strain.  Small to moderate sized pericardial effusion.    CHEST WALL AND LOWER NECK:  Subcentimeter bilateral supraclavicular lymph nodes.  Bilateral axillary lymph nodes measuring up to 1 cm short axis.    VISUALIZED UPPER ABDOMEN:  Scarring upper pole left kidney.    BONES: Within normal limits.    IMPRESSION:    Bilateral pulmonary emboli involving segmental right lower lobe pulmonary   arterial branches, left lingula branch pulmonary artery and segmental   left lower lobe pulmonary arterial branches as discussed.  No CT evidence for right-sided heart strain.    Patchy atelectatic changes lung bases, cannot exclude developing   pulmonary infarct.  Small left-sided pleural effusion.    Small to moderate pericardial effusion, which is increased in size from   CT scan of the abdomen and pelvis performed on 1/30/2022.    Other findings as discussed above.    --- End of Report ---    ***Please see the addendum at the top of this report. It may contain   additional important information or changes.****    USHA GARCIA MD; Attending Radiologist  This document has been electronically signed. Jan 31 2022 11:26AM  Addend:USHA GARCIA MD; Attending Radiologist  This addendum was electronically signed on: Jan 31 2022 11:30AM.      Ventricular Rate 98 BPM    Atrial Rate 98 BPM    P-R Interval 140 ms    QRS Duration 68 ms    Q-T Interval 370 ms    QTC Calculation(Bazett) 472 ms    P Axis 44 degrees    R Axis -11 degrees    T Axis 9 degrees    Diagnosis Line Normal sinus rhythm  Low voltage QRS  Nonspecific T wave abnormality  Prolonged QT  Abnormal ECG  When compared with ECG of 02-FEB-2022 01:34, (Unconfirmed)  No significant change was found  Confirmed by Uzair Santos MD ([id number]) on 2/2/2022 11:20:00 AM

## 2022-02-05 VITALS
SYSTOLIC BLOOD PRESSURE: 121 MMHG | TEMPERATURE: 98 F | DIASTOLIC BLOOD PRESSURE: 76 MMHG | HEART RATE: 88 BPM | OXYGEN SATURATION: 98 % | RESPIRATION RATE: 17 BRPM

## 2022-02-05 LAB
ALBUMIN SERPL ELPH-MCNC: 2.4 G/DL — LOW (ref 3.3–5)
ALP SERPL-CCNC: 118 U/L — SIGNIFICANT CHANGE UP (ref 40–120)
ALT FLD-CCNC: 20 U/L — SIGNIFICANT CHANGE UP (ref 12–78)
ANION GAP SERPL CALC-SCNC: 8 MMOL/L — SIGNIFICANT CHANGE UP (ref 5–17)
AST SERPL-CCNC: 14 U/L — LOW (ref 15–37)
BASOPHILS # BLD AUTO: 0.02 K/UL — SIGNIFICANT CHANGE UP (ref 0–0.2)
BASOPHILS NFR BLD AUTO: 0.2 % — SIGNIFICANT CHANGE UP (ref 0–2)
BILIRUB SERPL-MCNC: 0.5 MG/DL — SIGNIFICANT CHANGE UP (ref 0.2–1.2)
BUN SERPL-MCNC: 8 MG/DL — SIGNIFICANT CHANGE UP (ref 7–23)
CALCIUM SERPL-MCNC: 8.7 MG/DL — SIGNIFICANT CHANGE UP (ref 8.5–10.1)
CHLORIDE SERPL-SCNC: 104 MMOL/L — SIGNIFICANT CHANGE UP (ref 96–108)
CO2 SERPL-SCNC: 25 MMOL/L — SIGNIFICANT CHANGE UP (ref 22–31)
CREAT SERPL-MCNC: 0.76 MG/DL — SIGNIFICANT CHANGE UP (ref 0.5–1.3)
EOSINOPHIL # BLD AUTO: 0.33 K/UL — SIGNIFICANT CHANGE UP (ref 0–0.5)
EOSINOPHIL NFR BLD AUTO: 3.5 % — SIGNIFICANT CHANGE UP (ref 0–6)
GLUCOSE SERPL-MCNC: 103 MG/DL — HIGH (ref 70–99)
HCT VFR BLD CALC: 26.3 % — LOW (ref 34.5–45)
HGB BLD-MCNC: 8.3 G/DL — LOW (ref 11.5–15.5)
IMM GRANULOCYTES NFR BLD AUTO: 0.8 % — SIGNIFICANT CHANGE UP (ref 0–1.5)
LYMPHOCYTES # BLD AUTO: 1.65 K/UL — SIGNIFICANT CHANGE UP (ref 1–3.3)
LYMPHOCYTES # BLD AUTO: 17.7 % — SIGNIFICANT CHANGE UP (ref 13–44)
MAGNESIUM SERPL-MCNC: 2.1 MG/DL — SIGNIFICANT CHANGE UP (ref 1.6–2.6)
MCHC RBC-ENTMCNC: 26.5 PG — LOW (ref 27–34)
MCHC RBC-ENTMCNC: 31.6 GM/DL — LOW (ref 32–36)
MCV RBC AUTO: 84 FL — SIGNIFICANT CHANGE UP (ref 80–100)
MONOCYTES # BLD AUTO: 1.03 K/UL — HIGH (ref 0–0.9)
MONOCYTES NFR BLD AUTO: 11.1 % — SIGNIFICANT CHANGE UP (ref 2–14)
NEUTROPHILS # BLD AUTO: 6.22 K/UL — SIGNIFICANT CHANGE UP (ref 1.8–7.4)
NEUTROPHILS NFR BLD AUTO: 66.7 % — SIGNIFICANT CHANGE UP (ref 43–77)
NRBC # BLD: 0 /100 WBCS — SIGNIFICANT CHANGE UP (ref 0–0)
PHOSPHATE SERPL-MCNC: 3.6 MG/DL — SIGNIFICANT CHANGE UP (ref 2.5–4.5)
PLATELET # BLD AUTO: 451 K/UL — HIGH (ref 150–400)
POTASSIUM SERPL-MCNC: 3.5 MMOL/L — SIGNIFICANT CHANGE UP (ref 3.5–5.3)
POTASSIUM SERPL-SCNC: 3.5 MMOL/L — SIGNIFICANT CHANGE UP (ref 3.5–5.3)
PROT SERPL-MCNC: 7.8 G/DL — SIGNIFICANT CHANGE UP (ref 6–8.3)
RBC # BLD: 3.13 M/UL — LOW (ref 3.8–5.2)
RBC # FLD: 15.4 % — HIGH (ref 10.3–14.5)
SODIUM SERPL-SCNC: 137 MMOL/L — SIGNIFICANT CHANGE UP (ref 135–145)
WBC # BLD: 9.32 K/UL — SIGNIFICANT CHANGE UP (ref 3.8–10.5)
WBC # FLD AUTO: 9.32 K/UL — SIGNIFICANT CHANGE UP (ref 3.8–10.5)

## 2022-02-05 PROCEDURE — 87635 SARS-COV-2 COVID-19 AMP PRB: CPT

## 2022-02-05 PROCEDURE — 81001 URINALYSIS AUTO W/SCOPE: CPT

## 2022-02-05 PROCEDURE — 71275 CT ANGIOGRAPHY CHEST: CPT

## 2022-02-05 PROCEDURE — 83880 ASSAY OF NATRIURETIC PEPTIDE: CPT

## 2022-02-05 PROCEDURE — 99232 SBSQ HOSP IP/OBS MODERATE 35: CPT

## 2022-02-05 PROCEDURE — 85730 THROMBOPLASTIN TIME PARTIAL: CPT

## 2022-02-05 PROCEDURE — 36415 COLL VENOUS BLD VENIPUNCTURE: CPT

## 2022-02-05 PROCEDURE — 82553 CREATINE MB FRACTION: CPT

## 2022-02-05 PROCEDURE — 80048 BASIC METABOLIC PNL TOTAL CA: CPT

## 2022-02-05 PROCEDURE — 83605 ASSAY OF LACTIC ACID: CPT

## 2022-02-05 PROCEDURE — 93306 TTE W/DOPPLER COMPLETE: CPT

## 2022-02-05 PROCEDURE — 83550 IRON BINDING TEST: CPT

## 2022-02-05 PROCEDURE — 80053 COMPREHEN METABOLIC PANEL: CPT

## 2022-02-05 PROCEDURE — 82607 VITAMIN B-12: CPT

## 2022-02-05 PROCEDURE — 86850 RBC ANTIBODY SCREEN: CPT

## 2022-02-05 PROCEDURE — 83735 ASSAY OF MAGNESIUM: CPT

## 2022-02-05 PROCEDURE — 85027 COMPLETE CBC AUTOMATED: CPT

## 2022-02-05 PROCEDURE — 84484 ASSAY OF TROPONIN QUANT: CPT

## 2022-02-05 PROCEDURE — 82728 ASSAY OF FERRITIN: CPT

## 2022-02-05 PROCEDURE — 85018 HEMOGLOBIN: CPT

## 2022-02-05 PROCEDURE — 86900 BLOOD TYPING SEROLOGIC ABO: CPT

## 2022-02-05 PROCEDURE — 74177 CT ABD & PELVIS W/CONTRAST: CPT | Mod: MA

## 2022-02-05 PROCEDURE — 84100 ASSAY OF PHOSPHORUS: CPT

## 2022-02-05 PROCEDURE — 93005 ELECTROCARDIOGRAM TRACING: CPT

## 2022-02-05 PROCEDURE — 86901 BLOOD TYPING SEROLOGIC RH(D): CPT

## 2022-02-05 PROCEDURE — 85014 HEMATOCRIT: CPT

## 2022-02-05 PROCEDURE — 83540 ASSAY OF IRON: CPT

## 2022-02-05 PROCEDURE — 84702 CHORIONIC GONADOTROPIN TEST: CPT

## 2022-02-05 PROCEDURE — 85610 PROTHROMBIN TIME: CPT

## 2022-02-05 PROCEDURE — 93970 EXTREMITY STUDY: CPT

## 2022-02-05 PROCEDURE — 99285 EMERGENCY DEPT VISIT HI MDM: CPT

## 2022-02-05 PROCEDURE — 84145 PROCALCITONIN (PCT): CPT

## 2022-02-05 PROCEDURE — 71045 X-RAY EXAM CHEST 1 VIEW: CPT

## 2022-02-05 PROCEDURE — 99239 HOSP IP/OBS DSCHRG MGMT >30: CPT | Mod: GC

## 2022-02-05 PROCEDURE — 85025 COMPLETE CBC W/AUTO DIFF WBC: CPT

## 2022-02-05 PROCEDURE — 85379 FIBRIN DEGRADATION QUANT: CPT

## 2022-02-05 PROCEDURE — 87040 BLOOD CULTURE FOR BACTERIA: CPT

## 2022-02-05 PROCEDURE — 82550 ASSAY OF CK (CPK): CPT

## 2022-02-05 PROCEDURE — 82746 ASSAY OF FOLIC ACID SERUM: CPT

## 2022-02-05 RX ORDER — COLCHICINE 0.6 MG
1 TABLET ORAL
Qty: 10 | Refills: 0
Start: 2022-02-05 | End: 2022-02-09

## 2022-02-05 RX ADMIN — CLOPIDOGREL BISULFATE 75 MILLIGRAM(S): 75 TABLET, FILM COATED ORAL at 11:14

## 2022-02-05 RX ADMIN — Medication 150 MICROGRAM(S): at 05:43

## 2022-02-05 RX ADMIN — LISINOPRIL 2.5 MILLIGRAM(S): 2.5 TABLET ORAL at 05:43

## 2022-02-05 RX ADMIN — Medication 0.6 MILLIGRAM(S): at 17:01

## 2022-02-05 RX ADMIN — LIDOCAINE 1 PATCH: 4 CREAM TOPICAL at 11:14

## 2022-02-05 RX ADMIN — APIXABAN 10 MILLIGRAM(S): 2.5 TABLET, FILM COATED ORAL at 17:01

## 2022-02-05 RX ADMIN — Medication 650 MILLIGRAM(S): at 07:17

## 2022-02-05 RX ADMIN — APIXABAN 10 MILLIGRAM(S): 2.5 TABLET, FILM COATED ORAL at 05:43

## 2022-02-05 RX ADMIN — Medication 650 MILLIGRAM(S): at 05:46

## 2022-02-05 RX ADMIN — Medication 25 MILLIGRAM(S): at 05:43

## 2022-02-05 RX ADMIN — Medication 0.6 MILLIGRAM(S): at 05:43

## 2022-02-05 RX ADMIN — PANTOPRAZOLE SODIUM 40 MILLIGRAM(S): 20 TABLET, DELAYED RELEASE ORAL at 05:43

## 2022-02-05 NOTE — PROGRESS NOTE ADULT - PROVIDER SPECIALTY LIST ADULT
Cardiology
Vascular Surgery
Cardiology
Pulmonology
Cardiology
Cardiology
Pulmonology
Pulmonology
Vascular Surgery
Cardiology
Pulmonology
Pulmonology
Hospitalist

## 2022-02-05 NOTE — PROGRESS NOTE ADULT - ASSESSMENT
50 yo female with PMHx of hypothyroidism, CAD/NSTEMI s/p POBA to OM1 followed by PCI c/w SCAD, former smoker admitted for R acute DVT at greater saphenous vein/common femoral vein junction.    on Eliquis  Blood cx neg x 2  VS noted  on RA  trops noted  UA noted   Colchicine Added -     DVT - PE - possible evolving Pulm Infarct - AC (on Heparin gtt) - I carlos alberto - monitor VS and Sat - RV strain eval - ProBNP and CE noted -   pericardial effusion - pericarditis eval - TTE - CT imaging - Cardio f/u - Hemodynamic monitoring -   Covid 19 - positive on JAN 13 - repeat Neg on Jan   CAD HF - cvs rx regimen - cardio f/u  Obesity - weight management - ANUJ eval as outpatient  Ex Smoker - PFT testing as outpatient - Pulm Eval as outpatient for COPD  CTAP: small hemorrhage in the extraperitoneal space of the right pelvis along the right pelvic sidewall without discrete collection - on Hep gtt - serial H and H, Surgery following -   ICU eval noted  monitor VS and HD and Sat

## 2022-02-05 NOTE — PROGRESS NOTE ADULT - SUBJECTIVE AND OBJECTIVE BOX
[facility] -- Liz Dockery, Danielle, Albert, Kit Garcia Savella  Office # [phone]      Follow Up:    CAD NSTEMI  Subjective/Observations:   No events overnight resting comfortably in bed.  No complaints of chest pain, dyspnea, or palpitations reported. No signs of orthopnea or PND.  On ra sitting up , offers no complaints    REVIEW OF SYSTEMS: All other review of systems is negative unless indicated above    PAST MEDICAL & SURGICAL HISTORY:  Hypothyroidism    CAD (coronary artery disease)    NSTEMI (non-ST elevation myocardial infarction)    S/P cardiac catheterization  &amp; balloon angioplasty with thrombotic lesion in OM1        MEDICATIONS  (STANDING):  apixaban 10 milliGRAM(s) Oral every 12 hours  atorvastatin 10 milliGRAM(s) Oral at bedtime  clopidogrel Tablet 75 milliGRAM(s) Oral daily  colchicine 0.6 milliGRAM(s) Oral every 12 hours  levothyroxine 150 MICROGram(s) Oral daily  lidocaine   4% Patch 1 Patch Transdermal daily  lisinopril 2.5 milliGRAM(s) Oral daily  metoprolol succinate ER 25 milliGRAM(s) Oral daily  pantoprazole    Tablet 40 milliGRAM(s) Oral before breakfast    MEDICATIONS  (PRN):  acetaminophen     Tablet .. 650 milliGRAM(s) Oral every 8 hours PRN Mild Pain (1 - 3)  acetaminophen     Tablet .. 650 milliGRAM(s) Oral every 6 hours PRN Temp greater or equal to 38C (100.4F)  ondansetron Injectable 4 milliGRAM(s) IV Push every 6 hours PRN Nausea and/or Vomiting      Allergies    No Known Allergies    Intolerances        Vital Signs Last 24 Hrs  T(C): 36.9 (05 Feb 2022 05:19), Max: 36.9 (04 Feb 2022 11:32)  T(F): 98.5 (05 Feb 2022 05:19), Max: 98.5 (04 Feb 2022 11:32)  HR: 94 (05 Feb 2022 05:19) (90 - 94)  BP: 115/77 (05 Feb 2022 05:19) (101/66 - 115/77)  BP(mean): --  RR: 18 (05 Feb 2022 05:19) (18 - 20)  SpO2: 95% (05 Feb 2022 05:19) (95% - 98%)    I&O's Summary        PHYSICAL EXAM:  TELE: SR 90  Constitutional: NAD, awake and alert, obese  HEENT: Moist Mucous Membranes, Anicteric  Pulmonary: Non-labored, breath sounds are dim bases  Cardiovascular: Regular, S1 and S2 nl, No murmurs, rubs, gallops or clicks  Gastrointestinal: Bowel Sounds present, soft, nontender.   Lymph: No lymphadenopathy. No peripheral edema. LAVONNE stockings Bilaterally   Skin: No visible rashes or ulcers.  Psych:  Mood & affect appropriate    LABS: All Labs Reviewed:                        8.3    9.32  )-----------( 451      ( 05 Feb 2022 08:46 )             26.3                         8.2    11.65 )-----------( 463      ( 04 Feb 2022 05:46 )             25.6                         8.7    x     )-----------( x        ( 03 Feb 2022 13:29 )             26.8     05 Feb 2022 08:46    137    |  104    |  8      ----------------------------<  103    3.5     |  25     |  0.76   03 Feb 2022 03:38    136    |  105    |  8      ----------------------------<  106    3.4     |  24     |  0.79     Ca    8.7        05 Feb 2022 08:46  Ca    8.4        03 Feb 2022 03:38  Phos  3.6       05 Feb 2022 08:46  Mg     2.1       05 Feb 2022 08:46    TPro  7.8    /  Alb  2.4    /  TBili  0.5    /  DBili  x      /  AST  14     /  ALT  20     /  AlkPhos  118    05 Feb 2022 08:46  TPro  8.0    /  Alb  2.4    /  TBili  0.7    /  DBili  x      /  AST  12     /  ALT  19     /  AlkPhos  88     03 Feb 2022 03:38    PTT - ( 03 Feb 2022 17:33 )  PTT:33.0 sec  CARDIAC MARKERS ( 04 Feb 2022 05:46 )  x     / x     / 48 U/L / x     / <1.0 ng/mL      ECHO TTE WO CON COMP W DOPP         PROCEDURE DATE:  02/03/2022        INTERPRETATION:  INDICATION: Pericardial effusion  Sonographer PH    Blood Pressure 101/67    Height 167 cm     Weight 95.3 kg       BSA 2.0   sq m    Dimensions:  LA 4.7       Normal Values: 2.0 - 4.0 cm  Ao 2.7        Normal Values: 2.0 - 3.8 cm  SEPTUM 1.0       Normal Values: 0.6 - 1.2 cm  PWT 0.9       Normal Values: 0.6 - 1.1 cm  LVIDd 5.2         Normal Values: 3.0 - 5.6 cm  LVIDs 3.7         Normal Values: 1.8 - 4.0 cm      OBSERVATIONS:  Mitral Valve: Mild MR.  Aortic Valve/Aorta: Not well-visualized  Tricuspid Valve: normal with trace TR.  Pulmonic Valve: Not well-visualized  Left Atrium: Enlarged  Right Atrium: Not well-visualized  Left Ventricle: Low-normal left ventricular systolic function, estimated   LVEF of 50-55%. The endocardium is not well-visualized but systolic   function has improved when compared to previous echocardiogram  Right Ventricle: Grossly normal size and systolic function.  Pericardium: Small pericardial effusion without evidence of hemodynamic   compromise.    IMPRESSION:  Low-normal left ventricular systolic function, estimated LVEF of 50-55%.   The endocardium is not well-visualized but systolic function has improved   when compared to previous echocardiogram  Grossly normal RV size and systolic function.  The aortic valve is not well-visualized  Mild MR  Small pericardial effusion without evidence of hemodynamic compromise.    --- End of Report ---      EDGARD CHRISTIANSON MD; Attending Cardiologist  This document has been electronically signed. Feb  3 2022  9:11AM      ACC: [id number] [facility]                          *** ADD[facility]***    This critical value was discussed with Dr. AIDA Mccarty  by telephone at   the time of interpretation on 1/31/2022.    --- End of Report ---    *** END OF ADDENDUM***      PROCEDURE DATE:  01/31/2022          INTERPRETATION:  CLINICAL INFORMATION: Chest pain. Newly diagnosed deep   venous thrombosis.  Evaluate for pulmonary embolism.    COMPARISON: None.    CONTRAST/COMPLICATIONS:  IV Contrast: Omnipaque 350  73 cc administered   27 cc discarded  Oral Contrast: NONE  Complications: None reported at time of study completion    PROCEDURE:  CT Angiography of the Chest.  Sagittal and coronal reformats were performed as well as 3D (MIP)   reconstructions.    FINDINGS:    LUNGS AND AIRWAYS:  PLEURA: Bibasilar dependent atelectatic changes and patchy subpleural   airspace consolidation.  Small left-sided pleural effusion underlying compressive atelectasis.    Atelectatic changes inferior aspect left lingula lobe.    The central airways remain patent.    3 mm subpleural nodule anterior right upper lobe.    MEDIASTINUM AND MICKEY:  Subcentimeter short axis prevascular, right paratracheal and subcarinal   lymph nodes.    VESSELS:  There are filling defects compatible with acute pulmonary emboli   involving segmental right lower lobe pulmonary arterial branches, left   lingula pulmonary artery and superior segment left lower lobe pulmonary   artery and medial basal segment left lower lobe pulmonaryarteries.    HEART: The heart is mildly enlarged, no CT evidence for right-sided heart   strain.  Small to moderate sized pericardial effusion.    CHEST WALL AND LOWER NECK:  Subcentimeter bilateral supraclavicular lymph nodes.  Bilateral axillary lymph nodes measuring up to 1 cm short axis.    VISUALIZED UPPER ABDOMEN:  Scarring upper pole left kidney.    BONES: Within normal limits.    IMPRESSION:    Bilateral pulmonary emboli involving segmental right lower lobe pulmonary   arterial branches, left lingula branch pulmonary artery and segmental   left lower lobe pulmonary arterial branches as discussed.  No CT evidence for right-sided heart strain.    Patchy atelectatic changes lung bases, cannot exclude developing   pulmonary infarct.  Small left-sided pleural effusion.    Small to moderate pericardial effusion, which is increased in size from   CT scan of the abdomen and pelvis performed on 1/30/2022.    Other findings as discussed above.    --- End of Report ---    ***Please see the addendum at the top of this report. It may contain   additional important information or changes.****    USHA GARCIA MD; Attending Radiologist  This document has been electronically signed. Jan 31 2022 11:26AM  Addend:USHA GARCIA MD; Attending Radiologist  This addendum was electronically signed on: Jan 31 2022 11:30AM.      Ventricular Rate 98 BPM    Atrial Rate 98 BPM    P-R Interval 140 ms    QRS Duration 68 ms    Q-T Interval 370 ms    QTC Calculation(Bazett) 472 ms    P Axis 44 degrees    R Axis -11 degrees    T Axis 9 degrees    Diagnosis Line Normal sinus rhythm  Low voltage QRS  Nonspecific T wave abnormality  Prolonged QT  Abnormal ECG  When compared with ECG of 02-FEB-2022 01:34, (Unconfirmed)  No significant change was found

## 2022-02-05 NOTE — PROGRESS NOTE ADULT - ATTENDING COMMENTS
cad s/p pci  pericarditis, symptomatically improved  cont meds  dc planning for today with outpt followup

## 2022-02-05 NOTE — PROGRESS NOTE ADULT - ASSESSMENT
51 F with CAD s/p recent stent to OM1 c/w SCAD, and hypothyroidism p/w right upper thigh ecchymosis that she noticed 2 days ago.  Denies dizziness or lightheadedness.  Denies numbness or tingling of RLE. Patient had recently had a cardiac cath on 1/13 and 14 using an angioseal with no complication on exit site of catheter.      CAD S/P recent OM1 angioplasty  - S/P 1/13 NSTEMI with thrombotic lesion in OM1 S/P POBA p/w right thigh ecchymosis   - Will not resume ASA but continue Plavix, bb statin and lisinopril     Provoked DVT/PE with recent Covid Pna  - D-Dimer 1035, CTPA: b/l PE's  with small to moderate pericardial effusion without evidence of R heart strain  - Continue loading dose of Eliquis to be followed by 5 mg q12H.  Per Primary/pulmonary     Pericarditis/Pericardia Effusion  - TTE: RV not well visualized mild LV dysfunction EF 45% essentially unchanged small pericardial effusion.  Repeat showed small pericardiac effusion with no evidence of cardiac tamponade  - Continue Colchicine 0.6 mg q12.    - Continue PPI  -no complaints chest pain on exam   - Monitor and replete lytes, keep K>4, Mg>2.    Monitor and replete electrolytes. Keep K>4.0 and Mg>2.0.  dc planning   Keena Farooq FNP-C  Cardiology NP  [id number] [phone]

## 2022-02-05 NOTE — PROGRESS NOTE ADULT - SUBJECTIVE AND OBJECTIVE BOX
Date/Time Patient Seen:  		  Referring MD:   Data Reviewed	       Patient is a 51y old  Female who presents with a chief complaint of R above the knee DVT (04 Feb 2022 10:56)      Subjective/HPI     PAST MEDICAL & SURGICAL HISTORY:  Hypothyroidism    CAD (coronary artery disease)    NSTEMI (non-ST elevation myocardial infarction)    No significant past surgical history    S/P cardiac catheterization  &amp; balloon angioplasty with thrombotic lesion in OM1          Medication list         MEDICATIONS  (STANDING):  apixaban 10 milliGRAM(s) Oral every 12 hours  atorvastatin 10 milliGRAM(s) Oral at bedtime  clopidogrel Tablet 75 milliGRAM(s) Oral daily  colchicine 0.6 milliGRAM(s) Oral every 12 hours  levothyroxine 150 MICROGram(s) Oral daily  lidocaine   4% Patch 1 Patch Transdermal daily  lisinopril 2.5 milliGRAM(s) Oral daily  metoprolol succinate ER 25 milliGRAM(s) Oral daily  pantoprazole    Tablet 40 milliGRAM(s) Oral before breakfast    MEDICATIONS  (PRN):  acetaminophen     Tablet .. 650 milliGRAM(s) Oral every 8 hours PRN Mild Pain (1 - 3)  acetaminophen     Tablet .. 650 milliGRAM(s) Oral every 6 hours PRN Temp greater or equal to 38C (100.4F)  ondansetron Injectable 4 milliGRAM(s) IV Push every 6 hours PRN Nausea and/or Vomiting         Vitals log        ICU Vital Signs Last 24 Hrs  T(C): 36.9 (05 Feb 2022 05:19), Max: 36.9 (04 Feb 2022 11:32)  T(F): 98.5 (05 Feb 2022 05:19), Max: 98.5 (04 Feb 2022 11:32)  HR: 94 (05 Feb 2022 05:19) (90 - 94)  BP: 115/77 (05 Feb 2022 05:19) (101/66 - 115/77)  BP(mean): --  ABP: --  ABP(mean): --  RR: 18 (05 Feb 2022 05:19) (18 - 20)  SpO2: 95% (05 Feb 2022 05:19) (95% - 98%)           Input and Output:  I&O's Detail      Lab Data                        8.2    11.65 )-----------( 463      ( 04 Feb 2022 05:46 )             25.6             CARDIAC MARKERS ( 04 Feb 2022 05:46 )  x     / x     / 48 U/L / x     / <1.0 ng/mL        Review of Systems	      Objective     Physical Examination    heart s1s2  lung dec BS  abd soft      Pertinent Lab findings & Imaging      Calderón:  NO   Adequate UO     I&O's Detail           Discussed with:     Cultures:	        Radiology

## 2022-02-05 NOTE — PROGRESS NOTE ADULT - REASON FOR ADMISSION
R above the knee DVT

## 2022-02-09 LAB
CULTURE RESULTS: SIGNIFICANT CHANGE UP
CULTURE RESULTS: SIGNIFICANT CHANGE UP
SPECIMEN SOURCE: SIGNIFICANT CHANGE UP
SPECIMEN SOURCE: SIGNIFICANT CHANGE UP

## 2022-02-14 ENCOUNTER — APPOINTMENT (OUTPATIENT)
Dept: CARDIOLOGY | Facility: CLINIC | Age: 52
End: 2022-02-14
Payer: COMMERCIAL

## 2022-02-14 ENCOUNTER — NON-APPOINTMENT (OUTPATIENT)
Age: 52
End: 2022-02-14

## 2022-02-14 VITALS
HEART RATE: 111 BPM | BODY MASS INDEX: 40.18 KG/M2 | SYSTOLIC BLOOD PRESSURE: 120 MMHG | HEIGHT: 66 IN | DIASTOLIC BLOOD PRESSURE: 79 MMHG | OXYGEN SATURATION: 100 % | WEIGHT: 250 LBS

## 2022-02-14 PROCEDURE — 93000 ELECTROCARDIOGRAM COMPLETE: CPT

## 2022-02-14 PROCEDURE — 99215 OFFICE O/P EST HI 40 MIN: CPT

## 2022-02-14 RX ORDER — ASPIRIN 81 MG
81 TABLET, DELAYED RELEASE (ENTERIC COATED) ORAL DAILY
Refills: 0 | Status: DISCONTINUED | COMMUNITY
End: 2022-02-14

## 2022-03-15 ENCOUNTER — APPOINTMENT (OUTPATIENT)
Dept: CARDIOLOGY | Facility: CLINIC | Age: 52
End: 2022-03-15
Payer: COMMERCIAL

## 2022-03-15 ENCOUNTER — NON-APPOINTMENT (OUTPATIENT)
Age: 52
End: 2022-03-15

## 2022-03-15 VITALS
DIASTOLIC BLOOD PRESSURE: 76 MMHG | SYSTOLIC BLOOD PRESSURE: 124 MMHG | WEIGHT: 243 LBS | OXYGEN SATURATION: 99 % | HEIGHT: 66 IN | BODY MASS INDEX: 39.05 KG/M2 | HEART RATE: 83 BPM

## 2022-03-15 DIAGNOSIS — I31.9 DISEASE OF PERICARDIUM, UNSPECIFIED: ICD-10-CM

## 2022-03-15 PROCEDURE — 99214 OFFICE O/P EST MOD 30 MIN: CPT

## 2022-03-15 PROCEDURE — 93000 ELECTROCARDIOGRAM COMPLETE: CPT

## 2022-03-15 NOTE — DISCUSSION/SUMMARY
[FreeTextEntry1] : This is a 51 year old woman with a history of obesity who presents to the office for a follow up cardiac evaluation after series of admissions.  SHe presented with chest pain in January of 2022, and underwent coronary angiography for an NSTEMI.   SHe was found to have SCAD, and is s/p POBA to OM1.  She had mild LV dysfunction at this point.  She had COVID at this point as well.  She was discharged, only to be readmitted to  with pleuritic chest pain. SHe was noted to have a thickened pericardium with a small pericardial effusion, and was treated for pericarditis.  She was also noted to have DVTs and b/l pulmonary emboli.  SHe was started on AC.  She had a groin hematoma at this point, and DAPT was stopped while she was started on Eliquis.  She reports that she is getting only mild chest pains when laying down flat.  She will continue Plavix 75 QD and her statin drug. She will be treated to an LDL of less than 100.  She will continue Toprol 25 bid.   She will continue lisinopril 2.5 QD  She will continue full AC with Eliquis 5 bid.  Her hypercoagulable work up is ongoing.  If negative, she will stop AC in August, and resume aspirin.   She will continue colchicine for pericarditis for 90 days.  I will see her back in 2 months.  She is losing weight, which is great.

## 2022-03-15 NOTE — HISTORY OF PRESENT ILLNESS
[FreeTextEntry1] : This is a 51 year old woman with a history of obesity who presents to the office for a follow up cardiac evaluation.  She presented with chest pain in January of 2022, and underwent coronary angiography for an NSTEMI.   SHe was found to have SCAD, and is s/p POBA to OM1.  She had mild LV dysfunction at this point.  She had COVID at this point as well.  She was discharged, only to be readmitted to  with pleuritic chest pain. She was noted to have a thickened pericardium with a small pericardial effusion, and was treated for pericarditis.  She was also noted to have DVTs and b/l pulmonary emboli.  She was started on AC.  She had a groin hematoma at this point, and DAPT was stopped while she was started on Eliquis.  She reports that she is getting only mild chest pains when laying down flat, improved compared to the last time I saw her.  She otherwise denies exertional chest pain, changes in her ET, abnormal bleeding, PND, orthopnea, LE swelling, dizziness, or syncope.  She is taking her medications as prescribed, and not reporting any adverse effects.  She saw hematology, and is undergoing a hypercoagulable work up.  The plan is to stop AC in August if this is negative.

## 2022-04-04 ENCOUNTER — EMERGENCY (EMERGENCY)
Facility: HOSPITAL | Age: 52
LOS: 1 days | Discharge: HOME | End: 2022-04-04
Attending: EMERGENCY MEDICINE | Admitting: EMERGENCY MEDICINE
Payer: COMMERCIAL

## 2022-04-04 VITALS
SYSTOLIC BLOOD PRESSURE: 170 MMHG | WEIGHT: 244.93 LBS | OXYGEN SATURATION: 98 % | RESPIRATION RATE: 24 BRPM | DIASTOLIC BLOOD PRESSURE: 110 MMHG | TEMPERATURE: 98 F | HEIGHT: 66 IN | HEART RATE: 117 BPM

## 2022-04-04 VITALS
DIASTOLIC BLOOD PRESSURE: 68 MMHG | SYSTOLIC BLOOD PRESSURE: 130 MMHG | HEART RATE: 92 BPM | OXYGEN SATURATION: 100 % | TEMPERATURE: 99 F | RESPIRATION RATE: 16 BRPM

## 2022-04-04 DIAGNOSIS — Z98.890 OTHER SPECIFIED POSTPROCEDURAL STATES: Chronic | ICD-10-CM

## 2022-04-04 LAB
ALBUMIN SERPL ELPH-MCNC: 3.2 G/DL — LOW (ref 3.3–5)
ALP SERPL-CCNC: 66 U/L — SIGNIFICANT CHANGE UP (ref 40–120)
ALT FLD-CCNC: 68 U/L — SIGNIFICANT CHANGE UP (ref 12–78)
ANION GAP SERPL CALC-SCNC: 10 MMOL/L — SIGNIFICANT CHANGE UP (ref 5–17)
APTT BLD: 31.8 SEC — SIGNIFICANT CHANGE UP (ref 27.5–35.5)
AST SERPL-CCNC: 42 U/L — HIGH (ref 15–37)
BASOPHILS # BLD AUTO: 0 K/UL — SIGNIFICANT CHANGE UP (ref 0–0.2)
BASOPHILS NFR BLD AUTO: 0 % — SIGNIFICANT CHANGE UP (ref 0–2)
BILIRUB SERPL-MCNC: 0.8 MG/DL — SIGNIFICANT CHANGE UP (ref 0.2–1.2)
BUN SERPL-MCNC: 13 MG/DL — SIGNIFICANT CHANGE UP (ref 7–23)
CALCIUM SERPL-MCNC: 8.2 MG/DL — LOW (ref 8.5–10.1)
CHLORIDE SERPL-SCNC: 109 MMOL/L — HIGH (ref 96–108)
CO2 SERPL-SCNC: 23 MMOL/L — SIGNIFICANT CHANGE UP (ref 22–31)
CREAT SERPL-MCNC: 0.88 MG/DL — SIGNIFICANT CHANGE UP (ref 0.5–1.3)
EGFR: 80 ML/MIN/1.73M2 — SIGNIFICANT CHANGE UP
EOSINOPHIL # BLD AUTO: 0.15 K/UL — SIGNIFICANT CHANGE UP (ref 0–0.5)
EOSINOPHIL NFR BLD AUTO: 2 % — SIGNIFICANT CHANGE UP (ref 0–6)
GLUCOSE SERPL-MCNC: 115 MG/DL — HIGH (ref 70–99)
HCG SERPL-ACNC: <1 MIU/ML — SIGNIFICANT CHANGE UP
HCT VFR BLD CALC: 35.9 % — SIGNIFICANT CHANGE UP (ref 34.5–45)
HGB BLD-MCNC: 11 G/DL — LOW (ref 11.5–15.5)
INR BLD: 1.33 RATIO — HIGH (ref 0.88–1.16)
LIDOCAIN IGE QN: 109 U/L — SIGNIFICANT CHANGE UP (ref 73–393)
LYMPHOCYTES # BLD AUTO: 3.75 K/UL — HIGH (ref 1–3.3)
LYMPHOCYTES # BLD AUTO: 50 % — HIGH (ref 13–44)
MCHC RBC-ENTMCNC: 25.2 PG — LOW (ref 27–34)
MCHC RBC-ENTMCNC: 30.6 GM/DL — LOW (ref 32–36)
MCV RBC AUTO: 82.3 FL — SIGNIFICANT CHANGE UP (ref 80–100)
MONOCYTES # BLD AUTO: 0.52 K/UL — SIGNIFICANT CHANGE UP (ref 0–0.9)
MONOCYTES NFR BLD AUTO: 7 % — SIGNIFICANT CHANGE UP (ref 2–14)
NEUTROPHILS # BLD AUTO: 2.4 K/UL — SIGNIFICANT CHANGE UP (ref 1.8–7.4)
NEUTROPHILS NFR BLD AUTO: 32 % — LOW (ref 43–77)
NRBC # BLD: SIGNIFICANT CHANGE UP /100 WBCS (ref 0–0)
NT-PROBNP SERPL-SCNC: 3063 PG/ML — HIGH (ref 0–125)
PLATELET # BLD AUTO: 286 K/UL — SIGNIFICANT CHANGE UP (ref 150–400)
POTASSIUM SERPL-MCNC: 3.1 MMOL/L — LOW (ref 3.5–5.3)
POTASSIUM SERPL-SCNC: 3.1 MMOL/L — LOW (ref 3.5–5.3)
PROT SERPL-MCNC: 7.7 G/DL — SIGNIFICANT CHANGE UP (ref 6–8.3)
PROTHROM AB SERPL-ACNC: 15.6 SEC — HIGH (ref 10.5–13.4)
RBC # BLD: 4.36 M/UL — SIGNIFICANT CHANGE UP (ref 3.8–5.2)
RBC # FLD: 17.2 % — HIGH (ref 10.3–14.5)
SARS-COV-2 RNA SPEC QL NAA+PROBE: SIGNIFICANT CHANGE UP
SODIUM SERPL-SCNC: 142 MMOL/L — SIGNIFICANT CHANGE UP (ref 135–145)
TROPONIN I, HIGH SENSITIVITY RESULT: 176.1 NG/L — HIGH
TROPONIN I, HIGH SENSITIVITY RESULT: 181.7 NG/L — HIGH
WBC # BLD: 7.49 K/UL — SIGNIFICANT CHANGE UP (ref 3.8–10.5)
WBC # FLD AUTO: 7.49 K/UL — SIGNIFICANT CHANGE UP (ref 3.8–10.5)

## 2022-04-04 PROCEDURE — 87635 SARS-COV-2 COVID-19 AMP PRB: CPT

## 2022-04-04 PROCEDURE — 99285 EMERGENCY DEPT VISIT HI MDM: CPT

## 2022-04-04 PROCEDURE — 85730 THROMBOPLASTIN TIME PARTIAL: CPT

## 2022-04-04 PROCEDURE — 84484 ASSAY OF TROPONIN QUANT: CPT

## 2022-04-04 PROCEDURE — 99285 EMERGENCY DEPT VISIT HI MDM: CPT | Mod: 25

## 2022-04-04 PROCEDURE — 71275 CT ANGIOGRAPHY CHEST: CPT | Mod: MA

## 2022-04-04 PROCEDURE — 71045 X-RAY EXAM CHEST 1 VIEW: CPT

## 2022-04-04 PROCEDURE — 93010 ELECTROCARDIOGRAM REPORT: CPT

## 2022-04-04 PROCEDURE — 85025 COMPLETE CBC W/AUTO DIFF WBC: CPT

## 2022-04-04 PROCEDURE — 85610 PROTHROMBIN TIME: CPT

## 2022-04-04 PROCEDURE — 83690 ASSAY OF LIPASE: CPT

## 2022-04-04 PROCEDURE — 80053 COMPREHEN METABOLIC PANEL: CPT

## 2022-04-04 PROCEDURE — 84702 CHORIONIC GONADOTROPIN TEST: CPT

## 2022-04-04 PROCEDURE — 36415 COLL VENOUS BLD VENIPUNCTURE: CPT

## 2022-04-04 PROCEDURE — 71045 X-RAY EXAM CHEST 1 VIEW: CPT | Mod: 26

## 2022-04-04 PROCEDURE — 96374 THER/PROPH/DIAG INJ IV PUSH: CPT | Mod: XU

## 2022-04-04 PROCEDURE — 83880 ASSAY OF NATRIURETIC PEPTIDE: CPT

## 2022-04-04 PROCEDURE — 71275 CT ANGIOGRAPHY CHEST: CPT | Mod: 26,MA

## 2022-04-04 PROCEDURE — 93005 ELECTROCARDIOGRAM TRACING: CPT

## 2022-04-04 RX ORDER — POTASSIUM CHLORIDE 20 MEQ
40 PACKET (EA) ORAL ONCE
Refills: 0 | Status: COMPLETED | OUTPATIENT
Start: 2022-04-04 | End: 2022-04-04

## 2022-04-04 RX ORDER — FUROSEMIDE 40 MG
40 TABLET ORAL ONCE
Refills: 0 | Status: COMPLETED | OUTPATIENT
Start: 2022-04-04 | End: 2022-04-04

## 2022-04-04 RX ORDER — FUROSEMIDE 40 MG
1 TABLET ORAL
Qty: 30 | Refills: 0
Start: 2022-04-04 | End: 2022-05-03

## 2022-04-04 RX ORDER — NITROGLYCERIN 6.5 MG
0.4 CAPSULE, EXTENDED RELEASE ORAL ONCE
Refills: 0 | Status: DISCONTINUED | OUTPATIENT
Start: 2022-04-04 | End: 2022-04-04

## 2022-04-04 RX ADMIN — Medication 40 MILLIGRAM(S): at 08:07

## 2022-04-04 RX ADMIN — Medication 40 MILLIEQUIVALENT(S): at 04:35

## 2022-04-04 NOTE — CONSULT NOTE ADULT - ASSESSMENT
Melany is a 52 yo female hx of MI in setting of COVID, PE/DVT on eliquis c/o acute shortness of breath.    - no sign of acute ischemia, and though hs troponin is elevated, the trend does not suggest acs  - cath in 1/22 with complete occlusion of OM1 and OM2 and the lesions appeared to thrombotic. Given the multiple vessel distribution there was concern for an embolic event vs SCAD in the setting of COVID  - cont with plavix and statin    - more recently found to have bilateral PEs and DVT  - in the setting of worsening dyspnea, would check ctpa  - mild overload on exam, and would give Lasix 40 iv x 1  - oxygen supplementation as needed  - history of pericardial effusion, without hemodynamic compromise in the past, and may need a repeat echocardiogram  - has been on ac with eliquis    - further cardiac evaluation will depend on clinical course  - will follow with you

## 2022-04-04 NOTE — ED ADULT TRIAGE NOTE - CHIEF COMPLAINT QUOTE
Patient walked in to triage with noted SOB.  Patient has history of COVID and at the same time MI in JAN 2022.  Patient ambulatory Patient amblutory to triage with noted SOB.  Patient has history of COVID and at the same time MI in JAN 2022.  Patient awake, alert and orientated. Unable to speak in complete sentences due to SOB.  No signs of symptoms of injury. Patient has history of "blood clots in lungs"

## 2022-04-04 NOTE — ED ADULT NURSE NOTE - NSICDXPASTSURGICALHX_GEN_ALL_CORE_FT
PAST SURGICAL HISTORY:  S/P cardiac catheterization & balloon angioplasty with thrombotic lesion in OM1

## 2022-04-04 NOTE — ED PROVIDER NOTE - NSFOLLOWUPINSTRUCTIONS_ED_ALL_ED_FT
[facility] [place]                                                                                                                                              Heart Failure, Self-Care      Heart failure is a serious condition. The following information explains things you need to do to take care of yourself at home. To help you stay as healthy as possible, you may be asked to change your diet, take certain medicines, and make other changes in your life. Your doctor may also give you more specific instructions. If you have problems or questions, call your doctor.      What are the risks?    Having heart failure makes it more likely for you to have some problems. These problems can get worse if you do not take good care of yourself. Problems may include:  •Damage to the kidneys, liver, or lungs.      •Malnutrition.      •Abnormal heart rhythms.      •Blood clotting problems that could cause a stroke.        Supplies needed:    •Scale for weighing yourself.      •Blood pressure monitor.      •Notebook.      •Medicines.        How to care for yourself when you have heart failure    Medicines     Take over-the-counter and prescription medicines only as told by your doctor. Take your medicines every day.  •Do not stop taking your medicine unless your doctor tells you to do so.      •Do not skip any medicines.       •Get your prescriptions refilled before you run out of medicine. This is important.      •Talk with your doctor if you cannot afford your medicines.        Eating and drinking    •Eat heart-healthy foods. Talk with a diet specialist (dietitian) to create an eating plan.       •Limit salt (sodium) if told by your doctor. Ask your diet specialist to tell you which seasonings are healthy for your heart.       •Cook in healthy ways instead of frying. Healthy ways of cooking include roasting, grilling, broiling, baking, poaching, steaming, and stir-frying.    •Choose foods that:  •Have no trans fat.       •Are low in saturated fat and cholesterol.      •Choose healthy foods, such as:  •Fresh or frozen fruits and vegetables.      •Fish.      •Low-fat (lean) meats.      •Legumes, such as beans, peas, and lentils.      •Fat-free or low-fat dairy products.      •Whole-grain foods.      •High-fiber foods.        •Limit how much fluid you drink, if told by your doctor.      Alcohol use  •Do not drink alcohol if:   •Your doctor tells you not to drink.       •Your heart was damaged by alcohol, or you have very bad heart failure.      •You are pregnant, may be pregnant, or are planning to become pregnant.       •If you drink alcohol: •Limit how much you have to:  •0–1 drink a day for women.       •0–2 drinks a day for men.         •Know how much alcohol is in your drink. In the [place] one drink equals one 12 oz bottle of beer (355 mL), one 5 oz glass of wine (148 mL), or one 1½ oz glass of hard liquor (44 mL).          Lifestyle  •Do not smoke or use any products that contain nicotine or tobacco. If you need help quitting, ask your doctor.  •Do not use nicotine gum or patches before talking to your doctor.         •Do not use illegal drugs.       •Lose weight if told by your doctor.    •Do physical activity if told by your doctor. Talk to your doctor before you begin an exercise if:  •You are an older adult.       •You have very bad heart failure.        •Learn to manage stress. If you need help, ask your doctor.      •Get physical rehab (rehabilitation) to help you stay independent and to help with your quality of life.      •Participate in a cardiac rehab program. This program helps you improve your health through exercise, education, and counseling.      •Plan time to rest when you get tired.        Check weight and blood pressure  •Weigh yourself every day. This will help you to know if fluid is building up in your body.  •Weigh yourself every morning after you pee (urinate) and before you eat breakfast.      •Wear the same amount of clothing each time.      •Write down your daily weight. Give your record to your doctor.        •Check and write down your blood pressure as told by your doctor.       •Check your pulse as told by your doctor.      Dealing with very hot and very cold weather  •If it is very hot:   •Avoid activities that take a lot of energy.       •Use air conditioning or fans, or find a cooler place.       •Avoid caffeine and alcohol.       •Wear clothing that is loose-fitting, lightweight, and light-colored.      •If it is very cold:  •Avoid activities that take a lot of energy.       •Layer your clothes.       •Wear mittens or gloves, a hat, and a face covering when you go outside.      •Avoid alcohol.          Follow these instructions at home:    •Stay up to date with shots (vaccines). Get pneumococcal and flu (influenza) shots.      •Keep all follow-up visits.        Contact a doctor if:    •You gain 2–3 lb (1–1.4 kg) in 24 hours or 5 lb (2.3 kg) in a week.      •You have increasing shortness of breath.      •You cannot do your normal activities.       •You get tired easily.      •You cough a lot.      •You do not feel like eating or feel like you may vomit (nauseous).      •You have swelling in your hands, feet, ankles, or belly (abdomen).      •You cannot sleep well because it is hard to breathe.      •You feel like your heart is beating fast (palpitations).       •You get dizzy when you stand up.      •You feel depressed or sad.        Get help right away if:    •You have trouble breathing.       •You or someone else notices a change in your behavior, such as having trouble staying awake.      •You have chest pain or discomfort.       •You pass out (faint).      These symptoms may be an emergency. Get help right away. Call your local emergency services (911 in the [place]    • Do not wait to see if the symptoms will go away.       • Do not drive yourself to the hospital.         Summary    •Heart failure is a serious condition. To care for yourself, you may have to change your diet, take medicines, and make other lifestyle changes.      •Take your medicines every day. Do not stop taking them unless your doctor tells you to do so.      •Limit salt and eat heart-healthy foods.      •Ask your doctor if you can drink alcohol. You may have to stop alcohol use if you have very bad heart failure.      •Contact your doctor if you gain weight quickly or feel that your heart is beating too fast. Get help right away if you pass out or have chest pain or trouble breathing.      This information is not intended to replace advice given to you by your health care provider. Make sure you discuss any questions you have with your health care provider.      Document Revised: 07/10/2021 Document Reviewed: 07/10/2021    [employer] Patient Education © 2022 [employer] Heart Failure, Self-Care      Heart failure is a serious condition. The following information explains things you need to do to take care of yourself at home. To help you stay as healthy as possible, you may be asked to change your diet, take certain medicines, and make other changes in your life. Your doctor may also give you more specific instructions. If you have problems or questions, call your doctor.      What are the risks?    Having heart failure makes it more likely for you to have some problems. These problems can get worse if you do not take good care of yourself. Problems may include:  •Damage to the kidneys, liver, or lungs.      •Malnutrition.      •Abnormal heart rhythms.      •Blood clotting problems that could cause a stroke.        Supplies needed:    •Scale for weighing yourself.      •Blood pressure monitor.      •Notebook.      •Medicines.        How to care for yourself when you have heart failure    Medicines     Take over-the-counter and prescription medicines only as told by your doctor. Take your medicines every day.  •Do not stop taking your medicine unless your doctor tells you to do so.      •Do not skip any medicines.       •Get your prescriptions refilled before you run out of medicine. This is important.      •Talk with your doctor if you cannot afford your medicines.        Eating and drinking    •Eat heart-healthy foods. Talk with a diet specialist (dietitian) to create an eating plan.       •Limit salt (sodium) if told by your doctor. Ask your diet specialist to tell you which seasonings are healthy for your heart.       •Cook in healthy ways instead of frying. Healthy ways of cooking include roasting, grilling, broiling, baking, poaching, steaming, and stir-frying.    •Choose foods that:  •Have no trans fat.       •Are low in saturated fat and cholesterol.      •Choose healthy foods, such as:  •Fresh or frozen fruits and vegetables.      •Fish.      •Low-fat (lean) meats.      •Legumes, such as beans, peas, and lentils.      •Fat-free or low-fat dairy products.      •Whole-grain foods.      •High-fiber foods.        •Limit how much fluid you drink, if told by your doctor.      Alcohol use  •Do not drink alcohol if:   •Your doctor tells you not to drink.       •Your heart was damaged by alcohol, or you have very bad heart failure.      •You are pregnant, may be pregnant, or are planning to become pregnant.       •If you drink alcohol: •Limit how much you have to:  •0–1 drink a day for women.       •0–2 drinks a day for men.         •Know how much alcohol is in your drink. In the [place] one drink equals one 12 oz bottle of beer (355 mL), one 5 oz glass of wine (148 mL), or one 1½ oz glass of hard liquor (44 mL).          Lifestyle  •Do not smoke or use any products that contain nicotine or tobacco. If you need help quitting, ask your doctor.  •Do not use nicotine gum or patches before talking to your doctor.         •Do not use illegal drugs.       •Lose weight if told by your doctor.    •Do physical activity if told by your doctor. Talk to your doctor before you begin an exercise if:  •You are an older adult.       •You have very bad heart failure.        •Learn to manage stress. If you need help, ask your doctor.      •Get physical rehab (rehabilitation) to help you stay independent and to help with your quality of life.      •Participate in a cardiac rehab program. This program helps you improve your health through exercise, education, and counseling.      •Plan time to rest when you get tired.        Check weight and blood pressure  •Weigh yourself every day. This will help you to know if fluid is building up in your body.  •Weigh yourself every morning after you pee (urinate) and before you eat breakfast.      •Wear the same amount of clothing each time.      •Write down your daily weight. Give your record to your doctor.        •Check and write down your blood pressure as told by your doctor.       •Check your pulse as told by your doctor.      Dealing with very hot and very cold weather  •If it is very hot:   •Avoid activities that take a lot of energy.       •Use air conditioning or fans, or find a cooler place.       •Avoid caffeine and alcohol.       •Wear clothing that is loose-fitting, lightweight, and light-colored.      •If it is very cold:  •Avoid activities that take a lot of energy.       •Layer your clothes.       •Wear mittens or gloves, a hat, and a face covering when you go outside.      •Avoid alcohol.          Follow these instructions at home:    •Stay up to date with shots (vaccines). Get pneumococcal and flu (influenza) shots.      •Keep all follow-up visits.        Contact a doctor if:    •You gain 2–3 lb (1–1.4 kg) in 24 hours or 5 lb (2.3 kg) in a week.      •You have increasing shortness of breath.      •You cannot do your normal activities.       •You get tired easily.      •You cough a lot.      •You do not feel like eating or feel like you may vomit (nauseous).      •You have swelling in your hands, feet, ankles, or belly (abdomen).      •You cannot sleep well because it is hard to breathe.      •You feel like your heart is beating fast (palpitations).       •You get dizzy when you stand up.      •You feel depressed or sad.        Get help right away if:    •You have trouble breathing.       •You or someone else notices a change in your behavior, such as having trouble staying awake.      •You have chest pain or discomfort.       •You pass out (faint).      These symptoms may be an emergency. Get help right away. Call your local emergency services (911 in the [place]    • Do not wait to see if the symptoms will go away.       • Do not drive yourself to the hospital.         Summary    •Heart failure is a serious condition. To care for yourself, you may have to change your diet, take medicines, and make other lifestyle changes.      •Take your medicines every day. Do not stop taking them unless your doctor tells you to do so.      •Limit salt and eat heart-healthy foods.      •Ask your doctor if you can drink alcohol. You may have to stop alcohol use if you have very bad heart failure.      •Contact your doctor if you gain weight quickly or feel that your heart is beating too fast. Get help right away if you pass out or have chest pain or trouble breathing.      This information is not intended to replace advice given to you by your health care provider. Make sure you discuss any questions you have with your health care provider.      Document Revised: 07/10/2021 Document Reviewed: 07/10/2021    [employer] Patient Education © 2022 [employer]

## 2022-04-04 NOTE — ED PROVIDER NOTE - OBJECTIVE STATEMENT
52 yo female hx of MI, COVID, PE/DVT on eliquis c/o 1 week of progressively worsening symptoms of shortness of breath, worse with exertion and at night, uses multiple pillows to sleep at night.  No chest pain.  No fever/chills.    cards dr quiros

## 2022-04-04 NOTE — ED PROVIDER NOTE - PATIENT PORTAL LINK FT
You can access the FollowMyHealth Patient Portal offered by [facility] by registering at the following website: [email or web address]. By joining [facility]’s FollowMyHealth portal, you will also be able to view your health information using other applications (apps) compatible with our system.

## 2022-04-04 NOTE — ED ADULT NURSE NOTE - OBJECTIVE STATEMENT
Pt has been SOB x1 week worse tonight after laying down. pt has labored breathing using accessory muscles, has cough. Denies chest pain, headache, dizziness, abd pain, n/v/d. Pt has hx of PE.

## 2022-04-04 NOTE — ED PROVIDER NOTE - CARE PROVIDER_API CALL
Cesar Garcia)  [facility]  [address] [place] [address]  Phone: [phone]  Fax: [phone]  Follow Up Time:

## 2022-04-04 NOTE — ED PROVIDER NOTE - PHYSICAL EXAMINATION
Gen: Alert, NAD  Head/eyes: NC/AT, PERRL  ENT: airway patent  Neck: supple  Pulm/lung: decreased breath sounds b/l  CV/heart: RRR  GI/Abd: soft, NT/ND, +BS, no guarding/rebound tenderness  Musculoskeletal: no edema/erythema/cyanosis  Skin: no rash  Neuro: AAOx3, grossly intact

## 2022-04-04 NOTE — ED ADULT NURSE NOTE - NSICDXPASTMEDICALHX_GEN_ALL_CORE_FT
PAST MEDICAL HISTORY:  CAD (coronary artery disease)     Hypothyroidism     NSTEMI (non-ST elevation myocardial infarction)

## 2022-04-04 NOTE — ED ADULT NURSE REASSESSMENT NOTE - NS ED NURSE REASSESS COMMENT FT1
Pt ambulated to bathroom on RA. Pt SOB has gotten much better. Pt appears comfortable at this time.
Pt aox4, no acute distress noted. respirations even and nonlabored. no acute distress noted. pending iv lasix and cta. vss. denies chest pain. complains of mild sob that has improved. JOAQUIM nguyen

## 2022-04-04 NOTE — ED PROVIDER NOTE - NS ED ROS FT
Constitutional: - Fever, - Chills, - Anorexia, - Fatigue, - Night sweats  Eyes: - Discharge, - Irritation, - Redness, - Visual changes, - Light sensitivity, - Pain  EARS: - Ear Pain, - Tinnitus, - Decreased hearing  NOSE: - Congestion, - Epistaxis  MOUTH/THROAT: - Vocal Changes, - Drooling, - Sore throat  NECK: - Lumps, - Stiffness, - Pain  CV: - Palpitations, - Chest Pain, - Edema, - Syncope  RESP:  - Cough, +Shortness of Breath, - Dyspnea on Exertion, - Trouble speaking, - Pleuritic pain - Wheezing  GI: - Diarrhea, - Constipation, - Bloody stools, - Nausea, - Vomiting, - Abdominal Pain  : - Dysuria, -Frequency, - Hematuria, - Hesitancy, - Incontinence, - Saddle Anesthesia, - Abnormal discharge  MSK: - Myalgias, - Arthralgias, - Weakness, - Deformities, - Injuries  SKIN: - Color change, - Rash, - Swelling, - Ecchymosis, - Abrasion, - laceration  NEURO: - Change in behavior, - Dec. Alertness, - Headache, - Dizziness, - Change in speech, - Weakness, - Seizure-like activity, - Difficulty ambulating

## 2022-04-04 NOTE — ED ADULT NURSE NOTE - CHIEF COMPLAINT QUOTE
Patient amblutory to triage with noted SOB.  Patient has history of COVID and at the same time MI in JAN 2022.  Patient awake, alert and orientated. Unable to speak in complete sentences due to SOB.  No signs of symptoms of injury. Patient has history of "blood clots in lungs"

## 2022-04-04 NOTE — CONSULT NOTE ADULT - SUBJECTIVE AND OBJECTIVE BOX
[facility] - Liz Dockery, Danielle, Albert, Radha, Frankie Pantoja  Office Number: [phone]    Initial Consult Note    CHIEF COMPLAINT: Patient is a 51y old  Female who presents with a chief complaint of dyspnea.    HPI:  50 yo female hx of MI, COVID, PE/DVT on eliquis c/o 1 week of progressively worsening symptoms of shortness of breath, worse with exertion and at night, uses multiple pillows to sleep at night.  No chest pain.  No fever/chills.  Reports feeling well yesterday, until had acute dyspnea, which made her come to the ER.    She presented with chest pain in 1/22. Diagnostic angiogram showed complete occlusion of OM1 and OM2 the lesions appeared to thrombotic. Given the multiple vessel distribution there is concern for an embolic event vs SCAD in the setting of COVID.  Froilan presented in early 2/22 with dyspnea, and found to have bilateral PE/DVT.  Echo with small pericardial effusion without hemodynamic compromise.      PAST MEDICAL & SURGICAL HISTORY:  Hypothyroidism    CAD (coronary artery disease)    NSTEMI (non-ST elevation myocardial infarction)    S/P cardiac catheterization  &amp; balloon angioplasty with thrombotic lesion in OM1        SOCIAL HISTORY:  No tobacco, ethanol, or drug abuse.    FAMILY HISTORY:  FH: type 2 diabetes (Mother)    FH: HTN (hypertension) (Mother)    FH: hyperlipidemia (Mother)      No family history of acute MI or sudden cardiac death.    MEDICATIONS  (STANDING):  furosemide   Injectable 40 milliGRAM(s) IV Push Once    MEDICATIONS  (PRN):      Allergies    No Known Allergies    Intolerances        REVIEW OF SYSTEMS:    CONSTITUTIONAL: No weakness, fevers or chills  EYES/ENT: No visual changes;  No vertigo or throat pain   NECK: No pain or stiffness  RESPIRATORY: No cough, wheezing, hemoptysis; reports shortness of breath  CARDIOVASCULAR: No chest pain or palpitations  GASTROINTESTINAL: No abdominal pain. No nausea, vomiting, or hematemesis; No diarrhea or constipation. No melena or hematochezia.  GENITOURINARY: No dysuria, frequency or hematuria  NEUROLOGICAL: No numbness or weakness  SKIN: No itching or rash  All other review of systems is negative unless indicated above    VITAL SIGNS:   Vital Signs Last 24 Hrs  T(C): 36.7 (04 Apr 2022 03:27), Max: 36.7 (04 Apr 2022 03:27)  T(F): 98.1 (04 Apr 2022 03:27), Max: 98.1 (04 Apr 2022 03:27)  HR: 101 (04 Apr 2022 03:56) (101 - 117)  BP: 138/69 (04 Apr 2022 03:56) (138/69 - 170/110)  BP(mean): --  RR: 24 (04 Apr 2022 03:27) (24 - 24)  SpO2: 98% (04 Apr 2022 03:56) (98% - 98%)    I&O's Summary      On Exam:    Constitutional: NAD, alert and oriented x 3  Lungs:  Non-labored, breath sounds are coarse and decreased bilaterally, No wheezing, rales or rhonchi  Cardiovascular: RRR.  S1 and S2 positive.  No murmurs, rubs, gallops or clicks  Gastrointestinal: Bowel Sounds present, soft, nontender.   Lymph: trace peripheral edema. No cervical lymphadenopathy.  Neurological: Alert, no focal deficits  Skin: No rashes or ulcers   Psych:  Mood & affect appropriate.    LABS: All Labs Reviewed:                        11.0   7.49  )-----------( 286      ( 04 Apr 2022 03:52 )             35.9     04 Apr 2022 03:52    142    |  109    |  13     ----------------------------<  115    3.1     |  23     |  0.88     Ca    8.2        04 Apr 2022 03:52    TPro  7.7    /  Alb  3.2    /  TBili  0.8    /  DBili  x      /  AST  42     /  ALT  68     /  AlkPhos  66     04 Apr 2022 03:52    PT/INR - ( 04 Apr 2022 03:52 )   PT: 15.6 sec;   INR: 1.33 ratio         PTT - ( 04 Apr 2022 03:52 )  PTT:31.8 sec      Blood Culture:   04-04 @ 03:52  Pro Bnp [id number]        RADIOLOGY:    EKG: st, nsst abn

## 2022-04-04 NOTE — ED ADULT NURSE NOTE - ABDOMEN
Patient comes to clinic for follow up anticoagulation visit.  Last INR on 5/13/21 was 1.5.  Usual dose resumed.   Today's INR is 2.7 and is within goal range.    Current warfarin total weekly dose of 34.5 mg verified.  Informed the INR result is within therapeutic range and instructed to maintain current dose per protocol. Discussed dose and return date of 7/1/21 for next INR. See Anticoagulation flowsheet.    Dr. Guallpa is in the office today supervising the treatment.    Instructed to contact the clinic with any unusual bleeding or bruising, any changes in medications, diet, health status, lifestyle, or any other changes, questions or concerns. Verbalized understanding of all discussed.    soft

## 2022-04-08 ENCOUNTER — APPOINTMENT (OUTPATIENT)
Dept: CARDIOLOGY | Facility: CLINIC | Age: 52
End: 2022-04-08
Payer: COMMERCIAL

## 2022-04-08 ENCOUNTER — NON-APPOINTMENT (OUTPATIENT)
Age: 52
End: 2022-04-08

## 2022-04-08 VITALS
DIASTOLIC BLOOD PRESSURE: 75 MMHG | OXYGEN SATURATION: 100 % | SYSTOLIC BLOOD PRESSURE: 126 MMHG | HEIGHT: 66 IN | HEART RATE: 101 BPM

## 2022-04-08 PROCEDURE — 93000 ELECTROCARDIOGRAM COMPLETE: CPT

## 2022-04-08 PROCEDURE — 99215 OFFICE O/P EST HI 40 MIN: CPT

## 2022-04-08 RX ORDER — ACETAMINOPHEN 325 MG/1
325 TABLET ORAL EVERY 6 HOURS
Refills: 0 | Status: DISCONTINUED | COMMUNITY
End: 2022-04-08

## 2022-04-13 ENCOUNTER — MED ADMIN CHARGE (OUTPATIENT)
Age: 52
End: 2022-04-13

## 2022-04-13 ENCOUNTER — APPOINTMENT (OUTPATIENT)
Dept: CARDIOLOGY | Facility: CLINIC | Age: 52
End: 2022-04-13
Payer: COMMERCIAL

## 2022-04-13 PROCEDURE — 93306 TTE W/DOPPLER COMPLETE: CPT

## 2022-04-13 RX ADMIN — PERFLUTREN MG/ML: 6.52 INJECTION, SUSPENSION INTRAVENOUS at 00:00

## 2022-04-14 RX ORDER — PERFLUTREN 6.52 MG/ML
6.52 INJECTION, SUSPENSION INTRAVENOUS
Qty: 1 | Refills: 0 | Status: COMPLETED | OUTPATIENT
Start: 2022-04-13

## 2022-04-21 NOTE — ED ADULT TRIAGE NOTE - ARRIVAL FROM
Home Oxybutynin Counseling:  I discussed with the patient the risks of oxybutynin including but not limited to skin rash, drowsiness, dry mouth, difficulty urinating, and blurred vision.

## 2022-05-16 ENCOUNTER — NON-APPOINTMENT (OUTPATIENT)
Age: 52
End: 2022-05-16

## 2022-05-16 ENCOUNTER — APPOINTMENT (OUTPATIENT)
Dept: CARDIOLOGY | Facility: CLINIC | Age: 52
End: 2022-05-16
Payer: COMMERCIAL

## 2022-05-16 VITALS
SYSTOLIC BLOOD PRESSURE: 123 MMHG | WEIGHT: 254 LBS | OXYGEN SATURATION: 99 % | HEIGHT: 66 IN | BODY MASS INDEX: 40.82 KG/M2 | HEART RATE: 99 BPM | DIASTOLIC BLOOD PRESSURE: 82 MMHG

## 2022-05-16 PROCEDURE — 99215 OFFICE O/P EST HI 40 MIN: CPT

## 2022-05-16 PROCEDURE — 93000 ELECTROCARDIOGRAM COMPLETE: CPT

## 2022-06-16 ENCOUNTER — RX RENEWAL (OUTPATIENT)
Age: 52
End: 2022-06-16

## 2022-06-16 RX ORDER — ATORVASTATIN CALCIUM 10 MG/1
10 TABLET, FILM COATED ORAL
Qty: 90 | Refills: 2 | Status: ACTIVE | COMMUNITY
Start: 2022-06-16

## 2022-07-14 ENCOUNTER — APPOINTMENT (OUTPATIENT)
Dept: CV DIAGNOSITCS | Facility: HOSPITAL | Age: 52
End: 2022-07-14

## 2022-07-28 ENCOUNTER — NON-APPOINTMENT (OUTPATIENT)
Age: 52
End: 2022-07-28

## 2022-07-28 ENCOUNTER — APPOINTMENT (OUTPATIENT)
Dept: CARDIOLOGY | Facility: CLINIC | Age: 52
End: 2022-07-28

## 2022-07-28 VITALS
WEIGHT: 256 LBS | HEART RATE: 71 BPM | DIASTOLIC BLOOD PRESSURE: 71 MMHG | OXYGEN SATURATION: 100 % | BODY MASS INDEX: 41.14 KG/M2 | HEIGHT: 66 IN | SYSTOLIC BLOOD PRESSURE: 113 MMHG

## 2022-07-28 DIAGNOSIS — I26.99 OTHER PULMONARY EMBOLISM W/OUT ACUTE COR PULMONALE: ICD-10-CM

## 2022-07-28 PROCEDURE — 93000 ELECTROCARDIOGRAM COMPLETE: CPT

## 2022-07-28 PROCEDURE — 99214 OFFICE O/P EST MOD 30 MIN: CPT

## 2022-07-28 RX ORDER — ASPIRIN 81 MG/1
81 TABLET, COATED ORAL
Refills: 0 | Status: ACTIVE | COMMUNITY
Start: 2022-07-28

## 2022-07-28 RX ORDER — COLCHICINE 0.6 MG/1
0.6 TABLET ORAL
Refills: 0 | Status: DISCONTINUED | COMMUNITY
Start: 2022-02-14 | End: 2022-07-28

## 2022-07-28 RX ORDER — APIXABAN 5 MG/1
5 TABLET, FILM COATED ORAL
Qty: 28 | Refills: 0 | Status: DISCONTINUED | COMMUNITY
Start: 2022-02-14 | End: 2022-07-28

## 2023-01-25 ENCOUNTER — APPOINTMENT (OUTPATIENT)
Dept: CARDIOLOGY | Facility: CLINIC | Age: 53
End: 2023-01-25
Payer: COMMERCIAL

## 2023-01-25 ENCOUNTER — NON-APPOINTMENT (OUTPATIENT)
Age: 53
End: 2023-01-25

## 2023-01-25 VITALS
DIASTOLIC BLOOD PRESSURE: 85 MMHG | WEIGHT: 267 LBS | HEART RATE: 79 BPM | HEIGHT: 66 IN | SYSTOLIC BLOOD PRESSURE: 130 MMHG | OXYGEN SATURATION: 99 % | BODY MASS INDEX: 42.91 KG/M2

## 2023-01-25 PROCEDURE — 93000 ELECTROCARDIOGRAM COMPLETE: CPT

## 2023-01-25 PROCEDURE — 99214 OFFICE O/P EST MOD 30 MIN: CPT | Mod: 25

## 2023-01-25 RX ORDER — CLOPIDOGREL BISULFATE 75 MG/1
75 TABLET, FILM COATED ORAL DAILY
Qty: 1 | Refills: 1 | Status: DISCONTINUED | COMMUNITY
Start: 2022-02-14 | End: 2023-01-25

## 2023-02-23 LAB
ALBUMIN SERPL ELPH-MCNC: 4.4 G/DL
ALP BLD-CCNC: 58 U/L
ALT SERPL-CCNC: 46 U/L
ANION GAP SERPL CALC-SCNC: 14 MMOL/L
AST SERPL-CCNC: 31 U/L
BASOPHILS # BLD AUTO: 0.04 K/UL
BASOPHILS NFR BLD AUTO: 0.6 %
BILIRUB SERPL-MCNC: 0.8 MG/DL
BUN SERPL-MCNC: 9 MG/DL
CALCIUM SERPL-MCNC: 9.4 MG/DL
CHLORIDE SERPL-SCNC: 103 MMOL/L
CHOLEST SERPL-MCNC: 155 MG/DL
CO2 SERPL-SCNC: 24 MMOL/L
CREAT SERPL-MCNC: 0.92 MG/DL
EGFR: 75 ML/MIN/1.73M2
EOSINOPHIL # BLD AUTO: 0.18 K/UL
EOSINOPHIL NFR BLD AUTO: 2.5 %
ESTIMATED AVERAGE GLUCOSE: 123 MG/DL
GLUCOSE SERPL-MCNC: 89 MG/DL
HBA1C MFR BLD HPLC: 5.9 %
HCT VFR BLD CALC: 38.6 %
HDLC SERPL-MCNC: 61 MG/DL
HGB BLD-MCNC: 11.8 G/DL
IMM GRANULOCYTES NFR BLD AUTO: 0.3 %
LDLC SERPL CALC-MCNC: 84 MG/DL
LYMPHOCYTES # BLD AUTO: 2.73 K/UL
LYMPHOCYTES NFR BLD AUTO: 37.9 %
MAN DIFF?: NORMAL
MCHC RBC-ENTMCNC: 26.7 PG
MCHC RBC-ENTMCNC: 30.6 GM/DL
MCV RBC AUTO: 87.3 FL
MONOCYTES # BLD AUTO: 0.82 K/UL
MONOCYTES NFR BLD AUTO: 11.4 %
NEUTROPHILS # BLD AUTO: 3.42 K/UL
NEUTROPHILS NFR BLD AUTO: 47.3 %
NONHDLC SERPL-MCNC: 95 MG/DL
PLATELET # BLD AUTO: 316 K/UL
POTASSIUM SERPL-SCNC: 4.5 MMOL/L
PROT SERPL-MCNC: 7.2 G/DL
RBC # BLD: 4.42 M/UL
RBC # FLD: 16.5 %
SODIUM SERPL-SCNC: 140 MMOL/L
TRIGL SERPL-MCNC: 54 MG/DL
WBC # FLD AUTO: 7.21 K/UL

## 2023-03-13 NOTE — ED ADULT NURSE NOTE - ISOLATION TYPE:
Detail Level: Detailed
General Sunscreen Counseling: broad spectrum sunscreen/photoprotection reviewed extensively with patient - recommended SPF 30 or higher
None

## 2023-06-05 NOTE — PROGRESS NOTE ADULT - TIME-BASED BILLING (NON-CRITICAL CARE)
05-Jun-2023 20:41
Time-based billing (NON-critical care)

## 2023-08-28 ENCOUNTER — NON-APPOINTMENT (OUTPATIENT)
Age: 53
End: 2023-08-28

## 2023-08-28 ENCOUNTER — APPOINTMENT (OUTPATIENT)
Dept: CARDIOLOGY | Facility: CLINIC | Age: 53
End: 2023-08-28
Payer: COMMERCIAL

## 2023-08-28 VITALS
OXYGEN SATURATION: 100 % | HEIGHT: 66 IN | WEIGHT: 273 LBS | HEART RATE: 76 BPM | SYSTOLIC BLOOD PRESSURE: 137 MMHG | DIASTOLIC BLOOD PRESSURE: 77 MMHG | BODY MASS INDEX: 43.87 KG/M2

## 2023-08-28 PROCEDURE — 93000 ELECTROCARDIOGRAM COMPLETE: CPT

## 2023-08-28 PROCEDURE — 99214 OFFICE O/P EST MOD 30 MIN: CPT | Mod: 25

## 2023-08-28 NOTE — HISTORY OF PRESENT ILLNESS
[FreeTextEntry1] : This is a 53 year old woman with a history of obesity who presents to the office for a follow up cardiac evaluation.  She presented with chest pain in January of 2022, and underwent coronary angiography for an NSTEMI.   She was found to have SCAD, and is s/p POBA to OM1.  She had mild LV dysfunction at this point.  She had COVID at this point as well.  She was discharged, only to be readmitted to  with pleuritic chest pain. She was noted to have a thickened pericardium with a small pericardial effusion, and was treated for pericarditis.  She was also noted to have DVTs and b/l pulmonary emboli.  She was started on AC.  She had a groin hematoma at this point, and DAPT was stopped while she was started on Eliquis.  In April of 2022 she again presented to  with SOB, and a CT PA showed pulmonary edema.  She was given IV Lasix, and has been taking Lasix 20 PO QD as needed.  I repeated her echocardiogram, and she was noted to have a tethered mitral valve with moderate eccentric MR.  She reports that she is no longer short of breath.   No abnormal bleeding, PND, orthopnea, LE swelling, dizziness, or syncope.  She is taking her medications as prescribed, and not reporting any adverse effects.  No bleeding.  She did not take her Toprol today.  She has frequent PVCs on exam.  She has gained weight, a process she is trying to reverse.

## 2023-08-28 NOTE — DISCUSSION/SUMMARY
[FreeTextEntry1] : This is a 53 year old woman with a history of obesity who presents to the office for a follow up cardiac evaluation after series of admissions.  She presented with chest pain in January of 2022, and underwent coronary angiography for an NSTEMI.   She was found to have SCAD, and is s/p POBA to OM1.  She had mild LV dysfunction at this point.  She had COVID at this point as well.  She was discharged, only to be readmitted to  with pleuritic chest pain. She was noted to have a thickened pericardium with a small pericardial effusion, and was treated for pericarditis.  She was also noted to have DVTs and b/l pulmonary emboli.  SHe was started on AC.  She had a groin hematoma at this point, and DAPT was stopped while she was started on Eliquis.   In April of 2022 she again presented to  with SOB, and a CT PA showed pulmonary edema.  She is now euvolemic off of Lasix.   Her repeat echo showed a tethered mitral valve with moderate eccentric MR.   She needs to have a repeat echocardiogram.  She will continue aspirin.   She will continue her statin drug. She will be treated to an LDL of less than 100.  She will continue Toprol 25 bid.  She did not take it today, and has frequent PVCs.  I explained the importance of medication compliance.   She will continue lisinopril 2.5 QD.  I will see her back in six months.  She knows to call the office with any issues.   [EKG obtained to assist in diagnosis and management of assessed problem(s)] : EKG obtained to assist in diagnosis and management of assessed problem(s)

## 2023-09-12 ENCOUNTER — APPOINTMENT (OUTPATIENT)
Dept: CARDIOLOGY | Facility: CLINIC | Age: 53
End: 2023-09-12
Payer: COMMERCIAL

## 2023-09-12 ENCOUNTER — MED ADMIN CHARGE (OUTPATIENT)
Age: 53
End: 2023-09-12

## 2023-09-12 PROCEDURE — 93306 TTE W/DOPPLER COMPLETE: CPT

## 2023-09-12 RX ADMIN — PERFLUTREN MG/ML: 6.52 INJECTION, SUSPENSION INTRAVENOUS at 00:00

## 2023-09-13 RX ORDER — PERFLUTREN 6.52 MG/ML
6.52 INJECTION, SUSPENSION INTRAVENOUS
Qty: 2 | Refills: 0 | Status: COMPLETED | OUTPATIENT
Start: 2023-09-12

## 2023-09-15 NOTE — ED PROVIDER NOTE - NSICDXPASTMEDICALHX_GEN_ALL_CORE_FT
extension stretch into power web- 10 reps with 10 sec holds. Scar Mass/Edema Control:     Soft tissue massage x -L forearm with graston tool. Strengthening:     L hand  x        x     -Green^ 3# digiflex ex's- composite- 20 reps individual-20 reps   -Yellow theraputty ex's- - 10 reps and roll/pinch-3x. (HEP)  -Power hand gripper 2nd setting to pick and pass pom poms  -Red clothespin ax to pinch and pass pom poms   LUE     x -Red theraband ex's- high, middle and low rows. - 10 reps each   -Yellow 6# larissa bar- bend up/down and twists- 15 reps. Other: HEP     Ulnar nerve glides x    Compression sleeve  x -Size E tubigrip to help decrease pain. Assessment/Comments: Pt. Tolerated all exercises and stretches today. Pt. Stated her arm feels better day in comparison to her last session. Will continue to focus on desensitization, strength and coordination. Pt. Stated she talked with workman's comp doctor and New C-9 would be submitted and they added PTSD to the claim. -Rehab Potential: Good   -Patient Response to Treatment: Pt. Is happy with her progress , but still frustrated with her nerve pain. Patient. Education:  [x] Plans/Goals, Risks/Benefits discussed  [x] Home exercise program  Method of Education: [x] Verbal  [x] Demo  [] Written  Comprehension of Education:  [x] Verbalizes understanding. [x] Demonstrates understanding. [] Needs Review. [] Demonstrates/verbalizes understanding of HEP/Ed previously given.       Time In: 1000            Time Out: 1100         CODE  Minutes Time in- time out  Units   93794 Fluidotherapy 15 [id number] 1   80556 Paraffin      56705 Ultrasound      97825 Electrical Stim - Attended      61600 Therapeutic Ex 15 [id number] 1   56736 Therapeutic Activity 20 2757-2720 1   60990 Manual Therapy 10 3204-3436 1   65100 Orthotic Management/Training       Other                     Total  60  4       Plan: OT 2x/week for 12 sessions    [x]  Continues Plan of
PAST MEDICAL HISTORY:  CAD (coronary artery disease)     Hypothyroidism     NSTEMI (non-ST elevation myocardial infarction)

## 2023-09-18 ENCOUNTER — NON-APPOINTMENT (OUTPATIENT)
Age: 53
End: 2023-09-18

## 2023-09-18 ENCOUNTER — RX RENEWAL (OUTPATIENT)
Age: 53
End: 2023-09-18

## 2023-09-18 ENCOUNTER — APPOINTMENT (OUTPATIENT)
Dept: CARDIOLOGY | Facility: CLINIC | Age: 53
End: 2023-09-18
Payer: COMMERCIAL

## 2023-09-18 VITALS
WEIGHT: 265 LBS | DIASTOLIC BLOOD PRESSURE: 72 MMHG | SYSTOLIC BLOOD PRESSURE: 127 MMHG | BODY MASS INDEX: 42.59 KG/M2 | OXYGEN SATURATION: 98 % | HEART RATE: 84 BPM | HEIGHT: 66 IN

## 2023-09-18 PROCEDURE — 93000 ELECTROCARDIOGRAM COMPLETE: CPT

## 2023-09-18 PROCEDURE — 99215 OFFICE O/P EST HI 40 MIN: CPT | Mod: 25

## 2023-11-01 ENCOUNTER — APPOINTMENT (OUTPATIENT)
Dept: CARDIOLOGY | Facility: CLINIC | Age: 53
End: 2023-11-01
Payer: COMMERCIAL

## 2023-11-01 VITALS
SYSTOLIC BLOOD PRESSURE: 129 MMHG | BODY MASS INDEX: 44.68 KG/M2 | WEIGHT: 278 LBS | DIASTOLIC BLOOD PRESSURE: 80 MMHG | OXYGEN SATURATION: 100 % | HEART RATE: 80 BPM | HEIGHT: 66 IN

## 2023-11-01 PROCEDURE — 93000 ELECTROCARDIOGRAM COMPLETE: CPT

## 2023-11-01 PROCEDURE — 99215 OFFICE O/P EST HI 40 MIN: CPT | Mod: 25

## 2023-11-01 RX ORDER — METOPROLOL SUCCINATE 25 MG/1
25 TABLET, EXTENDED RELEASE ORAL
Qty: 180 | Refills: 0 | Status: DISCONTINUED | COMMUNITY
Start: 2022-10-24

## 2023-11-01 RX ORDER — LISINOPRIL 2.5 MG/1
2.5 TABLET ORAL DAILY
Refills: 0 | Status: DISCONTINUED | COMMUNITY
End: 2023-11-01

## 2023-11-02 ENCOUNTER — RX RENEWAL (OUTPATIENT)
Age: 53
End: 2023-11-02

## 2023-11-17 ENCOUNTER — NON-APPOINTMENT (OUTPATIENT)
Age: 53
End: 2023-11-17

## 2023-11-17 ENCOUNTER — APPOINTMENT (OUTPATIENT)
Dept: CARDIOLOGY | Facility: CLINIC | Age: 53
End: 2023-11-17
Payer: COMMERCIAL

## 2023-11-17 VITALS
BODY MASS INDEX: 45 KG/M2 | HEIGHT: 66 IN | DIASTOLIC BLOOD PRESSURE: 74 MMHG | HEART RATE: 80 BPM | OXYGEN SATURATION: 100 % | WEIGHT: 280 LBS | SYSTOLIC BLOOD PRESSURE: 139 MMHG

## 2023-11-17 PROCEDURE — 93000 ELECTROCARDIOGRAM COMPLETE: CPT

## 2023-11-17 PROCEDURE — 99214 OFFICE O/P EST MOD 30 MIN: CPT | Mod: 25

## 2023-12-06 ENCOUNTER — APPOINTMENT (OUTPATIENT)
Dept: CARDIOLOGY | Facility: CLINIC | Age: 53
End: 2023-12-06
Payer: COMMERCIAL

## 2023-12-06 VITALS
OXYGEN SATURATION: 98 % | HEART RATE: 72 BPM | WEIGHT: 282 LBS | BODY MASS INDEX: 45.32 KG/M2 | SYSTOLIC BLOOD PRESSURE: 138 MMHG | HEIGHT: 66 IN | DIASTOLIC BLOOD PRESSURE: 82 MMHG

## 2023-12-06 PROCEDURE — 99214 OFFICE O/P EST MOD 30 MIN: CPT | Mod: 25

## 2023-12-06 PROCEDURE — 93000 ELECTROCARDIOGRAM COMPLETE: CPT

## 2024-01-10 ENCOUNTER — MED ADMIN CHARGE (OUTPATIENT)
Age: 54
End: 2024-01-10

## 2024-01-10 ENCOUNTER — APPOINTMENT (OUTPATIENT)
Dept: CARDIOLOGY | Facility: CLINIC | Age: 54
End: 2024-01-10
Payer: COMMERCIAL

## 2024-01-10 PROCEDURE — 93306 TTE W/DOPPLER COMPLETE: CPT

## 2024-01-10 RX ORDER — PERFLUTREN 6.52 MG/ML
6.52 INJECTION, SUSPENSION INTRAVENOUS
Qty: 1 | Refills: 0 | Status: COMPLETED | OUTPATIENT
Start: 2024-01-10

## 2024-01-10 RX ADMIN — PERFLUTREN MG/ML: 6.52 INJECTION, SUSPENSION INTRAVENOUS at 00:00

## 2024-02-09 ENCOUNTER — APPOINTMENT (OUTPATIENT)
Dept: MRI IMAGING | Facility: CLINIC | Age: 54
End: 2024-02-09
Payer: COMMERCIAL

## 2024-02-09 ENCOUNTER — RESULT REVIEW (OUTPATIENT)
Age: 54
End: 2024-02-09

## 2024-02-09 ENCOUNTER — OUTPATIENT (OUTPATIENT)
Dept: OUTPATIENT SERVICES | Facility: HOSPITAL | Age: 54
LOS: 1 days | End: 2024-02-09
Payer: COMMERCIAL

## 2024-02-09 DIAGNOSIS — I42.8 OTHER CARDIOMYOPATHIES: ICD-10-CM

## 2024-02-09 DIAGNOSIS — Z98.890 OTHER SPECIFIED POSTPROCEDURAL STATES: Chronic | ICD-10-CM

## 2024-02-09 PROCEDURE — 75561 CARDIAC MRI FOR MORPH W/DYE: CPT

## 2024-02-09 PROCEDURE — 75561 CARDIAC MRI FOR MORPH W/DYE: CPT | Mod: 26

## 2024-02-09 PROCEDURE — 75565 CARD MRI VELOC FLOW MAPPING: CPT | Mod: 26

## 2024-02-09 PROCEDURE — A9585: CPT

## 2024-02-09 PROCEDURE — 75565 CARD MRI VELOC FLOW MAPPING: CPT

## 2024-02-12 ENCOUNTER — RX RENEWAL (OUTPATIENT)
Age: 54
End: 2024-02-12

## 2024-02-13 RX ORDER — METOPROLOL SUCCINATE 100 MG/1
100 TABLET, EXTENDED RELEASE ORAL TWICE DAILY
Qty: 180 | Refills: 2 | Status: ACTIVE | COMMUNITY
Start: 2022-02-14

## 2024-02-14 NOTE — PROGRESS NOTE ADULT - PROBLEM SELECTOR PROBLEM 1
2019 novel coronavirus disease (COVID-19) Non-ST elevation MI (NSTEMI)

## 2024-02-15 ENCOUNTER — RX RENEWAL (OUTPATIENT)
Age: 54
End: 2024-02-15

## 2024-02-15 ENCOUNTER — NON-APPOINTMENT (OUTPATIENT)
Age: 54
End: 2024-02-15

## 2024-02-15 ENCOUNTER — APPOINTMENT (OUTPATIENT)
Dept: CARDIOLOGY | Facility: CLINIC | Age: 54
End: 2024-02-15
Payer: COMMERCIAL

## 2024-02-15 VITALS
SYSTOLIC BLOOD PRESSURE: 127 MMHG | OXYGEN SATURATION: 99 % | HEART RATE: 76 BPM | WEIGHT: 280 LBS | BODY MASS INDEX: 45 KG/M2 | HEIGHT: 66 IN | DIASTOLIC BLOOD PRESSURE: 80 MMHG

## 2024-02-15 DIAGNOSIS — I42.8 OTHER CARDIOMYOPATHIES: ICD-10-CM

## 2024-02-15 PROCEDURE — 93000 ELECTROCARDIOGRAM COMPLETE: CPT

## 2024-02-15 PROCEDURE — 99215 OFFICE O/P EST HI 40 MIN: CPT

## 2024-02-15 PROCEDURE — G2211 COMPLEX E/M VISIT ADD ON: CPT

## 2024-02-15 RX ORDER — EPLERENONE 25 MG/1
25 TABLET, COATED ORAL DAILY
Qty: 90 | Refills: 2 | Status: ACTIVE | COMMUNITY
Start: 2024-02-15

## 2024-02-15 NOTE — HISTORY OF PRESENT ILLNESS
[FreeTextEntry1] : This is a 53 year old woman with a history of obesity who presents to the office for a follow up cardiac evaluation.  She presented with chest pain in January of 2022, and underwent coronary angiography for an NSTEMI.   She was found to have SCAD, and is s/p POBA to OM1.  She had mild LV dysfunction at this point.  She had COVID at this point as well.  She was discharged, only to be readmitted to  with pleuritic chest pain. She was noted to have a thickened pericardium with a small pericardial effusion, and was treated for pericarditis.  She was also noted to have DVTs and b/l pulmonary emboli.  She was started on AC.  She had a groin hematoma at this point, and DAPT was stopped while she was started on Eliquis.   I repeated her echocardiogram, and she was noted to have a tethered mitral valve with moderate eccentric MR.  She just had another echocardiogram, and is noted to have mild LV dysfunction with an EF of 40%.  She continues to have frequent PVCs.  She no longer feels the extra beats.   Her degree of exertional dyspnea is at baseline.   No abnormal bleeding, PND, orthopnea, LE swelling, dizziness, or syncope.   She has gained weight, a process she is trying to reverse.  She is taking her Toprol and Entresto as prescribed.   I sent her for a cardiac MRI, and she was noted to have transmural infarct of the basal and mid inferolateral and basal anterolateral segments with an EF of 33%.

## 2024-02-15 NOTE — DISCUSSION/SUMMARY
[FreeTextEntry1] : This is a 53 year old woman with a history of obesity who presents to the office for a follow up cardiac evaluation after series of admissions.  She presented with chest pain in January of 2022, and underwent coronary angiography for an NSTEMI.   She was found to have SCAD, and is s/p POBA to OM1.  She had mild LV dysfunction at this point.  She had COVID at this point as well.  She was discharged, only to be readmitted to  with pleuritic chest pain. She was noted to have a thickened pericardium with a small pericardial effusion, and was treated for pericarditis.  She was also noted to have DVTs and b/l pulmonary emboli.  SHe was started on AC.  She had a groin hematoma at this point, and DAPT was stopped while she was started on Eliquis.    Her repeat echo showed a tethered mitral valve with moderate eccentric MR.    She just had another echocardiogram, and is noted to have mild LV dysfunction with an EF of 40%.  I sent her for a cardiac MRI, and she was noted to have transmural infarct of the basal and mid inferolateral and basal anterolateral segments with an EF of 33%.    GIven the EF and frequent ventricular ectopy, I am sending her to EP to be considered for an ICD.   She will continue Toprol 100 bid, and Entresto 24-26 bid.  SHe will be started on eplerenone 25 QD.   She will continue aspirin.   She will continue her statin drug. She will be treated to an LDL of less than 100.   She will follow in 1 month.  She knows to call the office with any issues.   [EKG obtained to assist in diagnosis and management of assessed problem(s)] : EKG obtained to assist in diagnosis and management of assessed problem(s)

## 2024-02-15 NOTE — REASON FOR VISIT
[2 - 4 times a month (2 pts)] : 2-4 times a month (2 points) [1 or 2 (0 pts)] : 1 or 2 (0 points) [Never (0 pts)] : Never (0 points) [Retired] : retired [] :  [# Of Children ___] : has [unfilled] children [Former] : Former [> 15 Years] : > 15 Years [No falls in past year] : Patient reported no falls in the past year [0] : 2) Feeling down, depressed, or hopeless: Not at all (0) [PHQ-2 Negative - No further assessment needed] : PHQ-2 Negative - No further assessment needed [de-identified] : social [[id number]] : 0 [Reports changes in hearing] : Reports no changes in hearing [Reports changes in vision] : Reports no changes in vision [Reports changes in dental health] : Reports no changes in dental health [ColonoscopyDate] : 01/21 [de-identified] : contacts [de-identified] : smoked < 1 ppd for 8 years [Hyperlipidemia] : hyperlipidemia

## 2024-03-18 ENCOUNTER — APPOINTMENT (OUTPATIENT)
Dept: HEART FAILURE | Facility: CLINIC | Age: 54
End: 2024-03-18
Payer: COMMERCIAL

## 2024-03-18 VITALS
HEIGHT: 66 IN | BODY MASS INDEX: 45.64 KG/M2 | WEIGHT: 284 LBS | DIASTOLIC BLOOD PRESSURE: 80 MMHG | OXYGEN SATURATION: 99 % | SYSTOLIC BLOOD PRESSURE: 127 MMHG | HEART RATE: 74 BPM

## 2024-03-18 PROCEDURE — 99204 OFFICE O/P NEW MOD 45 MIN: CPT

## 2024-03-18 RX ORDER — EMPAGLIFLOZIN 10 MG/1
10 TABLET, FILM COATED ORAL
Qty: 30 | Refills: 3 | Status: ACTIVE | COMMUNITY
Start: 2024-03-18 | End: 1900-01-01

## 2024-03-19 ENCOUNTER — RX RENEWAL (OUTPATIENT)
Age: 54
End: 2024-03-19

## 2024-03-21 ENCOUNTER — APPOINTMENT (OUTPATIENT)
Dept: CARDIOLOGY | Facility: CLINIC | Age: 54
End: 2024-03-21

## 2024-03-21 NOTE — CARDIOLOGY SUMMARY
[de-identified] : NSR with PVCs [de-identified] : Jan 2024: LVEF 35-40%, normal RV, LVEDD 6.0cm, mild MR [de-identified] : Feb 2024: LVEF 33%, There is severe thinning and hypokinesis of the basal anterolateral and inferolateral segments, as well as mid inferolateral segment, with associated transmural enhancement.

## 2024-03-21 NOTE — ASSESSMENT
[FreeTextEntry1] : Patient is a 53 year old female with obesity, SCAD, DVT, and NICM ( EF 30-35%) who comes in to establish care with heart failure.    # Chronic systolic heart failure: on exam she is warm and well perfused without edema. She is NYHA class 2 -GDMT:  c/w eplerenone 25mg daily, entresto 24/26mg BID, and Toprol 100mg daily. Will start jardiance 10mg daily -Device:  Her EF is  35-40% on echo and 33% on MRI. However she is noted to have PVCs on EKG, she is being sent to EP for evaluation for ICD -Advanced Therapies: CPET for risk stratification once GDMT optimized  -HF lifestyle modifications including daily weights, BP log, and low sodium diet reiterated. Pt given daily weight and BP log and verbalized understanding to notify office if >3lb/1 day or >5 lb/1 week weight gain

## 2024-03-21 NOTE — HISTORY OF PRESENT ILLNESS
[FreeTextEntry1] : Patient is a 53 year old female with obesity, SCAD, DVT, and NICM ( EF 30-35%) who comes in to establish care with heart failure.    In Jan 2022 she had COVID and an NSTEMI. She was at this time found to have SCAD of the first OM. Since then she has been started on GDMT overall has been doing well. She is compliant with her medications. She denies headaches, dizziness, chest pain, SOB, SARAVIA, orthopnea, PND, or lower extremity edema.

## 2024-03-21 NOTE — PHYSICAL EXAM
[Well Developed] : well developed [Normal Conjunctiva] : normal conjunctiva [Well Nourished] : well nourished [Normal Venous Pressure] : normal venous pressure [No Carotid Bruit] : no carotid bruit [Normal S1, S2] : normal S1, S2 [No Murmur] : no murmur [Clear Lung Fields] : clear lung fields [Good Air Entry] : good air entry [Soft] : abdomen soft [Non Tender] : non-tender [Normal Gait] : normal gait [No Edema] : no edema [No Cyanosis] : no cyanosis [No Rash] : no rash [Moves all extremities] : moves all extremities

## 2024-03-25 LAB
ANION GAP SERPL CALC-SCNC: 13 MMOL/L
BUN SERPL-MCNC: 9 MG/DL
CALCIUM SERPL-MCNC: 8.9 MG/DL
CHLORIDE SERPL-SCNC: 102 MMOL/L
CO2 SERPL-SCNC: 24 MMOL/L
CREAT SERPL-MCNC: 0.89 MG/DL
EGFR: 77 ML/MIN/1.73M2
GLUCOSE SERPL-MCNC: 73 MG/DL
POTASSIUM SERPL-SCNC: 4.2 MMOL/L
SODIUM SERPL-SCNC: 139 MMOL/L

## 2024-04-08 ENCOUNTER — APPOINTMENT (OUTPATIENT)
Dept: CARDIOLOGY | Facility: CLINIC | Age: 54
End: 2024-04-08
Payer: COMMERCIAL

## 2024-04-08 ENCOUNTER — APPOINTMENT (OUTPATIENT)
Dept: HEART FAILURE | Facility: CLINIC | Age: 54
End: 2024-04-08
Payer: COMMERCIAL

## 2024-04-08 VITALS
HEART RATE: 74 BPM | HEIGHT: 66 IN | WEIGHT: 283 LBS | BODY MASS INDEX: 45.48 KG/M2 | DIASTOLIC BLOOD PRESSURE: 71 MMHG | OXYGEN SATURATION: 100 % | SYSTOLIC BLOOD PRESSURE: 109 MMHG

## 2024-04-08 DIAGNOSIS — I34.0 NONRHEUMATIC MITRAL (VALVE) INSUFFICIENCY: ICD-10-CM

## 2024-04-08 PROCEDURE — G2211 COMPLEX E/M VISIT ADD ON: CPT

## 2024-04-08 PROCEDURE — 99214 OFFICE O/P EST MOD 30 MIN: CPT

## 2024-04-08 PROCEDURE — 99215 OFFICE O/P EST HI 40 MIN: CPT

## 2024-04-08 PROCEDURE — 93000 ELECTROCARDIOGRAM COMPLETE: CPT

## 2024-04-08 RX ORDER — SACUBITRIL AND VALSARTAN 49; 51 MG/1; MG/1
49-51 TABLET, FILM COATED ORAL TWICE DAILY
Qty: 60 | Refills: 3 | Status: ACTIVE | COMMUNITY
Start: 2024-04-08 | End: 1900-01-01

## 2024-04-08 NOTE — ASSESSMENT
[FreeTextEntry1] : Patient is a 53 year old female with obesity, SCAD, DVT, and NICM ( EF 30-35%) who comes in to establish care with heart failure.    # Chronic systolic heart failure: on exam she is warm and well perfused without edema. She is NYHA class 2 -GDMT:  c/w eplerenone 25mg daily, jardiance 10mg daily and Toprol 100mg daily. Will increase entresto to 49/51mg BID - repeat bloodwork ordered -Device:  Her EF is 35-40% on echo and 33% on MRI.  - Will see if she qualifies for cardiac rehab -Advanced Therapies: CPET for risk stratification once GDMT optimized if her EF remains low -HF lifestyle modifications including daily weights, BP log, and low sodium diet reiterated. Pt given daily weight and BP log and verbalized understanding to notify office if >3lb/1 day or >5 lb/1 week weight gain

## 2024-04-08 NOTE — PHYSICAL EXAM
[Well Developed] : well developed [Well Nourished] : well nourished [Normal Conjunctiva] : normal conjunctiva [Normal Venous Pressure] : normal venous pressure [No Carotid Bruit] : no carotid bruit [Normal S1, S2] : normal S1, S2 [No Murmur] : no murmur [Clear Lung Fields] : clear lung fields [Good Air Entry] : good air entry [Soft] : abdomen soft [Non Tender] : non-tender [Normal Gait] : normal gait [No Edema] : no edema [No Cyanosis] : no cyanosis [No Rash] : no rash [Moves all extremities] : moves all extremities

## 2024-04-08 NOTE — HISTORY OF PRESENT ILLNESS
[FreeTextEntry1] : Patient is a 53 year old female with obesity, SCAD, DVT, and NICM ( EF 30-35%) who comes in to establish care with heart failure.    In Jan 2022 she had COVID and an NSTEMI. She was at this time found to have SCAD of the first OM. Since then she has been started on GDMT overall has been doing well. She is compliant with her medications.   4/8/24: Patient comes in for followup. Overall feels well. She is taking her medications without side effects. . She denies headaches, dizziness, chest pain, SOB, SARAVIA, orthopnea, PND, or lower extremity edema.

## 2024-04-08 NOTE — CARDIOLOGY SUMMARY
[de-identified] : NSR with PVCs [de-identified] : Jan 2024: LVEF 35-40%, normal RV, LVEDD 6.0cm, mild MR [de-identified] : Feb 2024: LVEF 33%, There is severe thinning and hypokinesis of the basal anterolateral and inferolateral segments, as well as mid inferolateral segment, with associated transmural enhancement.

## 2024-04-08 NOTE — DISCUSSION/SUMMARY
[FreeTextEntry1] : This is a 53 year old woman with a history of obesity who presents to the office for a follow up cardiac evaluation after series of admissions.  She presented with chest pain in January of 2022, and underwent coronary angiography for an NSTEMI.   She was found to have SCAD, and is s/p POBA to OM1.  She had mild LV dysfunction at this point.  She had COVID at this point as well.  She was discharged, only to be readmitted to  with pleuritic chest pain. She was noted to have a thickened pericardium with a small pericardial effusion, and was treated for pericarditis.  She was also noted to have DVTs and b/l pulmonary emboli.  SHe was started on AC.  She had a groin hematoma at this point, and DAPT was stopped while she was started on Eliquis.    Her repeat echo showed a tethered mitral valve with moderate eccentric MR.    She just had another echocardiogram, and is noted to have mild LV dysfunction with an EF of 40%.  I sent her for a cardiac MRI, and she was noted to have transmural infarct of the basal and mid inferolateral and basal anterolateral segments with an EF of 33%.    Given the EF and frequent ventricular ectopy, I am sending her to EP to be considered for an ICD.   She will continue Toprol 100 bid, and Entresto  49-51 bid.  SHe will continue on eplerenone 25 QD and Jardiance.   She will continue aspirin.   She will continue her statin drug. She will be treated to an LDL of less than 100.   She will follow in 1 month.  She knows to call the office with any issues.   [EKG obtained to assist in diagnosis and management of assessed problem(s)] : EKG obtained to assist in diagnosis and management of assessed problem(s)

## 2024-04-18 ENCOUNTER — RX RENEWAL (OUTPATIENT)
Age: 54
End: 2024-04-18

## 2024-04-26 ENCOUNTER — MED ADMIN CHARGE (OUTPATIENT)
Age: 54
End: 2024-04-26

## 2024-04-26 ENCOUNTER — APPOINTMENT (OUTPATIENT)
Dept: CARDIOLOGY | Facility: CLINIC | Age: 54
End: 2024-04-26
Payer: COMMERCIAL

## 2024-04-26 PROCEDURE — 93306 TTE W/DOPPLER COMPLETE: CPT

## 2024-04-26 RX ORDER — PERFLUTREN 6.52 MG/ML
6.52 INJECTION, SUSPENSION INTRAVENOUS
Qty: 1 | Refills: 0 | Status: COMPLETED | OUTPATIENT
Start: 2024-04-26

## 2024-04-26 RX ADMIN — PERFLUTREN MG/ML: 6.52 INJECTION, SUSPENSION INTRAVENOUS at 00:00

## 2024-04-29 ENCOUNTER — APPOINTMENT (OUTPATIENT)
Dept: ELECTROPHYSIOLOGY | Facility: CLINIC | Age: 54
End: 2024-04-29
Payer: COMMERCIAL

## 2024-04-29 ENCOUNTER — NON-APPOINTMENT (OUTPATIENT)
Age: 54
End: 2024-04-29

## 2024-04-29 VITALS
OXYGEN SATURATION: 100 % | HEIGHT: 66 IN | SYSTOLIC BLOOD PRESSURE: 119 MMHG | HEART RATE: 70 BPM | BODY MASS INDEX: 46.28 KG/M2 | DIASTOLIC BLOOD PRESSURE: 77 MMHG | WEIGHT: 288 LBS

## 2024-04-29 DIAGNOSIS — I25.42 CORONARY ARTERY DISSECTION: ICD-10-CM

## 2024-04-29 PROCEDURE — 99204 OFFICE O/P NEW MOD 45 MIN: CPT | Mod: 25

## 2024-04-29 PROCEDURE — 93000 ELECTROCARDIOGRAM COMPLETE: CPT

## 2024-05-01 PROBLEM — I25.42 SPONTANEOUS DISSECTION OF CORONARY ARTERY: Status: ACTIVE | Noted: 2022-02-14

## 2024-05-01 NOTE — CARDIOLOGY SUMMARY
[de-identified] : 4/29/24: Sinus rhythm. Old inferolateral MI [de-identified] : 4/2024: 1. Technically difficult image quality. 2. Left ventricular cavity is moderately dilated. Left ventricular systolic function is moderately decreased with an ejection fraction of 37 % by Ennis's method of disks. 3. There is hypokinesis of the basal to mid inferolateral wall. 4. There is normal LV mass and normal geometry. 5. There is mild (grade 1) left ventricular diastolic dysfunction. 6. Normal right ventricular cavity size and probably normal systolic function. 7. The left atrium is normal in size. 8. Symmetric mitral valve leaflet tethering. 9. Trace mitral regurgitation. 10. Trileaflet aortic valve with normal systolic excursion. There is focal calcification of the aortic valve leaflets. 11. Compared to the transthoracic echocardiogram performed on 1/10/2024, LA is within normal limit's on today's exam.

## 2024-05-01 NOTE — HISTORY OF PRESENT ILLNESS
[FreeTextEntry1] : This is a 53 year old woman with history of obesity, HL, SCAD in 2022 s/p POBA to OM1 and resultant NYHA II ICM with moderate-severe systolic dysfunction. At baseline she notes no CP, palpitations, dizziness, or syncope. She is short of breath when climbing 2 flights of stairs.  cMRI in 2/24 showed an EF of 33% with scarring of the basal/mid anterolateral and inferolateral LV. She presents now to discuss implantation of a primary prevention ICD.

## 2024-05-01 NOTE — DISCUSSION/SUMMARY
[FreeTextEntry1] : In summary, this is a 53 year old woman with history of acute MI in 2022 due to SCAD with resultant infarction of her inferolateral wall and moderate-severe chronic systolic dysfunction. We discussed the risk of ventricular tachyarrhythmia in this setting and the role of primary prevention ICDs in improving mortality outcomes. We discussed the risks and merits of transvenous versus subcutaneous devices. I advised a subcutaneous device given her young age.  The rationale for device implantation and well as the procedural risks--including but not limited to pocket hematoma, infection, pneumothorax, pericardial bleed/tamponade, lead dislodgement, and death--were reviewed in detail. She would like to further consider her options.  She appeared to understand the whole discussion and verbalized that all of her questions were answered to her satisfaction.  Thank you for allowing me to be involved in the care of this pleasant woman. Please feel free to contact me with any questions.   Morris Burns MD  of Cardiology Electrophysiology Section [address] [place] [address] Office: [phone] Fax: [phone] [EKG obtained to assist in diagnosis and management of assessed problem(s)] : EKG obtained to assist in diagnosis and management of assessed problem(s)

## 2024-05-13 ENCOUNTER — NON-APPOINTMENT (OUTPATIENT)
Age: 54
End: 2024-05-13

## 2024-05-13 ENCOUNTER — APPOINTMENT (OUTPATIENT)
Dept: CARDIOLOGY | Facility: CLINIC | Age: 54
End: 2024-05-13
Payer: COMMERCIAL

## 2024-05-13 VITALS
DIASTOLIC BLOOD PRESSURE: 86 MMHG | OXYGEN SATURATION: 100 % | BODY MASS INDEX: 45.32 KG/M2 | WEIGHT: 282 LBS | SYSTOLIC BLOOD PRESSURE: 145 MMHG | HEART RATE: 73 BPM | HEIGHT: 66 IN

## 2024-05-13 VITALS — SYSTOLIC BLOOD PRESSURE: 120 MMHG | DIASTOLIC BLOOD PRESSURE: 76 MMHG

## 2024-05-13 DIAGNOSIS — Z00.00 ENCOUNTER FOR GENERAL ADULT MEDICAL EXAMINATION W/OUT ABNORMAL FINDINGS: ICD-10-CM

## 2024-05-13 DIAGNOSIS — I25.5 ISCHEMIC CARDIOMYOPATHY: ICD-10-CM

## 2024-05-13 DIAGNOSIS — I50.42 CHRONIC COMBINED SYSTOLIC (CONGESTIVE) AND DIASTOLIC (CONGESTIVE) HEART FAILURE: ICD-10-CM

## 2024-05-13 DIAGNOSIS — I42.0 ISCHEMIC CARDIOMYOPATHY: ICD-10-CM

## 2024-05-13 PROCEDURE — 93000 ELECTROCARDIOGRAM COMPLETE: CPT

## 2024-05-13 PROCEDURE — G2211 COMPLEX E/M VISIT ADD ON: CPT

## 2024-05-13 PROCEDURE — 99215 OFFICE O/P EST HI 40 MIN: CPT

## 2024-05-13 RX ORDER — SACUBITRIL AND VALSARTAN 24; 26 MG/1; MG/1
24-26 TABLET, FILM COATED ORAL TWICE DAILY
Qty: 60 | Refills: 0 | Status: DISCONTINUED | COMMUNITY
Start: 2023-11-17 | End: 2024-05-13

## 2024-05-13 NOTE — DISCUSSION/SUMMARY
[FreeTextEntry1] : This is a 53 year old woman with a history of obesity who presents to the office for a follow up cardiac evaluation after series of admissions.  She presented with chest pain in January of 2022, and underwent coronary angiography for an NSTEMI.   She was found to have SCAD, and is s/p POBA to OM1.  She had mild LV dysfunction at this point.  She had COVID at this point as well.  She was discharged, only to be readmitted to  with pleuritic chest pain. She was noted to have a thickened pericardium with a small pericardial effusion, and was treated for pericarditis.  She was also noted to have DVTs and b/l pulmonary emboli.  SHe was started on AC.  She had a groin hematoma at this point, and DAPT was stopped while she was started on Eliquis.    Her repeat echo showed a tethered mitral valve with moderate eccentric MR.    She just had another echocardiogram, and is noted to have mild LV dysfunction with an EF of 40%.  I sent her for a cardiac MRI, and she was noted to have transmural infarct of the basal and mid inferolateral and basal anterolateral segments with an EF of 33%.    Given the EF and frequent ventricular ectopy, she was seen by EP.  She is apprehensive about ICD placement.  I will continue to address this with her at future visits.  We discussed the risks and benefits of ICD placement, as well as the risk of delaying placement, including the risk of sudden cardiac death.    She will continue Toprol 100 bid, and Entresto  49-51 bid.  She will continue on eplerenone 25 QD and Jardiance.   She will continue aspirin.   She will continue her statin drug. She will be treated to an LDL of less than 100.   She will follow in 3 month.  She knows to call the office with any issues.  I am repeating a full set of blood work.  [EKG obtained to assist in diagnosis and management of assessed problem(s)] : EKG obtained to assist in diagnosis and management of assessed problem(s)

## 2024-05-13 NOTE — HISTORY OF PRESENT ILLNESS
[FreeTextEntry1] : This is a 53 year old woman with a history of obesity who presents to the office for a follow up cardiac evaluation.  She presented with chest pain in January of 2022, and underwent coronary angiography for an NSTEMI.   She was found to have SCAD, and is s/p POBA to OM1.  She had mild LV dysfunction at this point.  She had COVID at this point as well.  She was discharged, only to be readmitted to  with pleuritic chest pain. She was noted to have a thickened pericardium with a small pericardial effusion, and was treated for pericarditis.  She was also noted to have DVTs and b/l pulmonary emboli.  She was started on AC.  She had a groin hematoma at this point, and DAPT was stopped while she was started on Eliquis.   I repeated her echocardiogram, and she was noted to have a tethered mitral valve with moderate eccentric MR.  She just had another echocardiogram, and is noted to have mild LV dysfunction with an EF of 40%.  She continues to have frequent PVCs.  She no longer feels the extra beats.   Her degree of exertional dyspnea is at baseline.   No abnormal bleeding, PND, orthopnea, LE swelling, dizziness, or syncope.   She has gained weight, a process she is trying to reverse.  She is taking her Toprol and Entresto as prescribed.   I sent her for a cardiac MRI, and she was noted to have transmural infarct of the basal and mid inferolateral and basal anterolateral segments with an EF of 33%.  She has seen Dr Burns to consider ICD placement, but she is still apprehensive about it.

## 2024-08-26 ENCOUNTER — APPOINTMENT (OUTPATIENT)
Dept: CARDIOLOGY | Facility: CLINIC | Age: 54
End: 2024-08-26
Payer: COMMERCIAL

## 2024-08-26 ENCOUNTER — NON-APPOINTMENT (OUTPATIENT)
Age: 54
End: 2024-08-26

## 2024-08-26 VITALS
HEART RATE: 82 BPM | OXYGEN SATURATION: 100 % | DIASTOLIC BLOOD PRESSURE: 83 MMHG | WEIGHT: 275 LBS | HEIGHT: 66 IN | BODY MASS INDEX: 44.2 KG/M2 | SYSTOLIC BLOOD PRESSURE: 122 MMHG

## 2024-08-26 DIAGNOSIS — I42.8 OTHER CARDIOMYOPATHIES: ICD-10-CM

## 2024-08-26 PROCEDURE — 99214 OFFICE O/P EST MOD 30 MIN: CPT

## 2024-08-26 PROCEDURE — G2211 COMPLEX E/M VISIT ADD ON: CPT

## 2024-08-26 PROCEDURE — 93000 ELECTROCARDIOGRAM COMPLETE: CPT

## 2024-08-27 ENCOUNTER — RX RENEWAL (OUTPATIENT)
Age: 54
End: 2024-08-27

## 2024-08-27 RX ORDER — ROSUVASTATIN CALCIUM 5 MG/1
5 TABLET, FILM COATED ORAL
Qty: 45 | Refills: 2 | Status: ACTIVE | COMMUNITY
Start: 2024-08-26

## 2024-08-27 NOTE — HISTORY OF PRESENT ILLNESS
[FreeTextEntry1] : This is a 54 year old woman with a history of obesity who presents to the office for a follow up cardiac evaluation.  She presented with chest pain in January of 2022, and underwent coronary angiography for an NSTEMI.   She was found to have SCAD, and is s/p POBA to OM1.  She had mild LV dysfunction at this point.  She had COVID at this point as well.  She was discharged, only to be readmitted to  with pleuritic chest pain. She was noted to have a thickened pericardium with a small pericardial effusion, and was treated for pericarditis.  She was also noted to have DVTs and b/l pulmonary emboli.  She was started on AC.  She had a groin hematoma at this point, and DAPT was stopped while she was started on Eliquis.   I repeated her echocardiogram, and she was noted to have a tethered mitral valve with moderate eccentric MR.  She just had another echocardiogram, and is noted to have mild LV dysfunction with an EF of 40%.  She continues to have frequent PVCs.  She no longer feels the extra beats.   Her degree of exertional dyspnea is at baseline.   No abnormal bleeding, PND, orthopnea, LE swelling, dizziness, or syncope.   She has gained weight, a process she is trying to reverse.  She is taking her Toprol and Entresto as prescribed.   I sent her for a cardiac MRI, and she was noted to have transmural infarct of the basal and mid inferolateral and basal anterolateral segments with an EF of 33%.  She has seen Dr Burns to consider ICD placement, but she is still apprehensive about it.  She stopped taking her statin drug as she was getting muscle cramping without speaking to me.

## 2024-08-27 NOTE — PHYSICAL EXAM
June 30, 2023     Jason Vigil, [address] [facility] [address]    Patient: Татьяна Valenzuela   YOB: 1981   Date of Visit: 6/30/2023       Dear Dr Karla Viveros: Thank you for referring Татьяна Valenzuela to me for evaluation  Below are my notes for this consultation  If you have questions, please do not hesitate to call me  I look forward to following your patient along with you  Sincerely,        Lalit Riley DPM        CC: No Recipients    [facility]  6/30/2023  4:47 PM  Addendum  Assessment/Plan:    Diagnoses and all orders for this visit:    Acute left ankle pain  -     Ambulatory Referral to Podiatry  -     meloxicam (Mobic) 7 5 mg tablet; Take 1 tablet (7 5 mg total) by mouth daily  -     EMG 1 Limb; Future    Achilles tendinitis of both lower extremities  -     Ambulatory Referral to Podiatry  -     X-ray foot left 3+ views; Future  -     X-ray foot right 3+ views; Future    Type 2 diabetes mellitus with diabetic polyneuropathy, without long-term current use of insulin ([facility] [address] )  -     Ambulatory Referral to Podiatry  -     EMG 1 Limb; Future    Tarsal tunnel syndrome of left side  -     Ambulatory Referral to Podiatry  -     Ambulatory Referral to Pain Management; Future  -     EMG 1 Limb; Future    Pain of left heel  -     Ambulatory Referral to Podiatry  -     X-ray foot left 3+ views; Future         Imaging Reviewed at this visit (I personally reviewed/independently interpreted images and reports in PACS)  XR left ankle WB 3v 4/28/23: no acute osseous abnormality  XR left ankle 3v NWB 6/16/23: no acute osseous abnormality  Right and left foot 6v 6/30/23: small plantar and posterior calcaneal spurs  Right TNJ arthritic changes  IMPRESSION:  Acute left ankle pain and swelling    Left medial ankle pain likely tarsal tunnel syndomre  Uncontrolled type 2 diabetes with chronic bilateral lower extremity neuropathy, A1c 9 5% 4/1/23   BMI 38     PLAN:  I reviewed clinical exam and radiographic imaging (XR's) with patient in detail today  I have discussed with the patient the pathophysiology of this diagnosis and reviewed how the examination correlates with this diagnosis  I reviewed Dr Fercho Thompson note from 5/30/23  Was given lace up brace and recommended heel cups plus PT   I reviewed urgent care note from 6/16/23  No right foot pain today  Patient notes left medial ankle/plantar foot N/T/B (present since March 2023) which is concerning for tarsal tunnel syndrome  I ordered EMG to further assess and referred to pain mngmt as patient has high A1c and not a candidate for surgery at this time  Patient notes left ankle swelling and lateral pain without inciting event for about one month  She has baseline diabetic peripheral neuropathy and explains that she may have injured herself however is uncertain  XR's negative for fracture or dislocation  Differentials include ankle effusion, venous stasis, lateral ankle sprain  I applied an unna boot as below, recommended LE elevation, c/w ankle brace as previously given by Dr Usha Crouch and ordered meloxicam  Will consider MRI and basic labs next appt if no improvement  Meloxicam 7 5 mg daily rx'd for 10 days for pain  Do not take with other NSAIDs  Patient educated regarding bleeding risk of taking this medication as well as not taking any other nonsteroidal anti-inflammatory medications while taking this medication; counseled thoroughly regarding potential risk of Cardiovascular injury, Kidney injury, Gastrointestinal ulceration/bleeding  Patient voiced understanding  F/u 10 days for recheck    Splint, Casting, Strapping    Date/Time: 6/30/2023 1:30 PM    Performed by: Jorden Bhat DPM  Authorized by: Jorden Bhat DPM  Universal Protocol:  Consent: Verbal consent obtained    Risks and benefits: risks, benefits and alternatives were discussed  Consent given by: patient  Patient understanding: patient states understanding of the procedure being performed  Patient consent: the patient's understanding of the procedure matches consent given  Patient identity confirmed: verbally with patient      Pre-procedure details:     Sensation:  Unchanged  Procedure details:     Laterality:  Left    Location:  Leg    Leg:  L lower leg    Strapping: no  Cast type:  Unna boot      Supplies:  2 layer wrap  Post-procedure details:     Pain:  Improved    Sensation:  Unchanged    Patient tolerance of procedure: Tolerated well, no immediate complications        Subjective:     Patient ID: Liz Weiner is a 39 y o  female  Samuel Grimaldo presents to clinic today concerning left ankle and foot pain and diabetic foot check  Notes the inner ankle has been hurting since March and described as N/T/B  She has h/o TTS with surgery to right foot and notes symptoms are the same as prior to surgery  She went to Dr Jenni Torres last month who referred her to me for consult  Notes left lateral and anterior ankle pain and swelling for the past month without inciting event  Notes her feet are baseline numb due to her DM thus she is unsure if she hurt herself  She went to urgent care who referred her here  No pain to the right foot today  DM w/ PN A1c 9 5%      The following portions of the patient's history were reviewed and updated as appropriate: allergies, current medications, past family history, past medical history, past social history, past surgical history and problem list     Review of Systems   Constitutional: Negative for activity change, chills and fever  HENT: Negative  Respiratory: Negative for cough, chest tightness and shortness of breath  Cardiovascular: Negative for chest pain and leg swelling  Endocrine: Negative  Genitourinary: Negative  Musculoskeletal: Positive for gait problem (Left foot/ankle pain) and joint swelling (Left ankle)  Neurological: Positive for numbness (B/L feet)  Psychiatric/Behavioral: Negative    Negative for agitation and "behavioral problems  Objective:      /80 (BP Location: Left arm)   Pulse 90   Temp 97 9 °F (36 6 °C) (Temporal)   Ht 5' 2\" (1 575 m)   Wt 98 kg (216 lb)   LMP 05/25/2023   BMI 39 51 kg/m²         Physical Exam  Cardiovascular:      Pulses: Pulses are weak  Dorsalis pedis pulses are 1+ on the right side and 1+ on the left side  Posterior tibial pulses are 1+ on the right side and 1+ on the left side  Musculoskeletal:         General: Swelling (Left ankle, especially lateral) and tenderness (Left tarsal tunnel  Left lateral ankle and ankle joint) present  No signs of injury  Comments: ROM left ankle intact and without pain  + Pain with palpation to ATFL, lateral ankle joint, posterior ankle joint  Feet:      Right foot:      Skin integrity: Dry skin present  No ulcer, skin breakdown, erythema, warmth or callus  Left foot:      Skin integrity: Dry skin present  No ulcer, skin breakdown, erythema, warmth or callus  Skin:     Findings: No erythema or lesion  Neurological:      Comments: + N/T/B with palpation and tinel to left medial tarsal tunnel region  Diabetic Foot Exam    Patient's shoes and socks removed  Right Foot/Ankle   Right Foot Inspection  Skin Exam: skin normal, skin intact and dry skin  No warmth, no callus, no erythema, no maceration, no abnormal color, no pre-ulcer, no ulcer and no callus  Toe Exam: right toe deformity (HTs)  Sensory   Vibration: absent  Proprioception: absent  Monofilament testing: absent    Vascular  Capillary refills: < 3 seconds  The right DP pulse is 1+  The right PT pulse is 1+  Left Foot/Ankle  Left Foot Inspection  Skin Exam: skin normal, skin intact and dry skin  No warmth, no erythema, no maceration, normal color, no pre-ulcer, no ulcer and no callus  Toe Exam: left toe deformity (HTs)  No swelling       Sensory   Vibration: absent  Proprioception: absent  Monofilament testing: " absent    Vascular  Capillary refills: < 3 seconds  The left DP pulse is 1+  The left PT pulse is 1+       Assign Risk Category  Deformity present  Loss of protective sensation  Weak pulses  Risk: 2 [Well Developed] : well developed [Well Nourished] : well nourished [No Acute Distress] : no acute distress [Normal Conjunctiva] : normal conjunctiva [Normal Venous Pressure] : normal venous pressure [No Carotid Bruit] : no carotid bruit [Normal S1, S2] : normal S1, S2 [No Murmur] : no murmur [No Rub] : no rub [No Gallop] : no gallop [Clear Lung Fields] : clear lung fields [Good Air Entry] : good air entry [No Respiratory Distress] : no respiratory distress  [Soft] : abdomen soft [Non Tender] : non-tender [No Masses/organomegaly] : no masses/organomegaly [Normal Bowel Sounds] : normal bowel sounds [Normal Gait] : normal gait [No Edema] : no edema [No Cyanosis] : no cyanosis [No Clubbing] : no clubbing [No Varicosities] : no varicosities [No Rash] : no rash [No Skin Lesions] : no skin lesions [Moves all extremities] : moves all extremities [No Focal Deficits] : no focal deficits [Normal Speech] : normal speech [Alert and Oriented] : alert and oriented [Normal memory] : normal memory

## 2024-08-27 NOTE — DISCUSSION/SUMMARY
[FreeTextEntry1] : This is a 54 year old woman with a history of obesity who presents to the office for a follow up cardiac evaluation after series of admissions.  She presented with chest pain in January of 2022, and underwent coronary angiography for an NSTEMI.   She was found to have SCAD, and is s/p POBA to OM1.  She had mild LV dysfunction at this point.  She had COVID at this point as well.  She was discharged, only to be readmitted to  with pleuritic chest pain. She was noted to have a thickened pericardium with a small pericardial effusion, and was treated for pericarditis.  She was also noted to have DVTs and b/l pulmonary emboli.  SHe was started on AC.  She had a groin hematoma at this point, and DAPT was stopped while she was started on Eliquis.    Her repeat echo showed a tethered mitral valve with moderate eccentric MR.    She just had another echocardiogram, and is noted to have mild LV dysfunction with an EF of 40%.  I sent her for a cardiac MRI, and she was noted to have transmural infarct of the basal and mid inferolateral and basal anterolateral segments with an EF of 33%.    Given the EF and frequent ventricular ectopy, she was seen by EP.  She is apprehensive about ICD placement.  I will continue to address this with her at future visits.  We discussed the risks and benefits of ICD placement, as well as the risk of delaying placement, including the risk of sudden cardiac death.    She will continue Toprol 100 bid, and Entresto  49-51 bid.  She will continue on eplerenone 25 QD and Jardiance.   She will continue aspirin.   She stopped taking atorvastatin without speaking with me as she was getting muscle cramping.  I am starting rosuvastatin 5 QOD, and will follow to see if she develops cramps.   She will be treated to an LDL of less than 100.   She will follow in 3 month.  She knows to call the office with any issues.    [EKG obtained to assist in diagnosis and management of assessed problem(s)] : EKG obtained to assist in diagnosis and management of assessed problem(s)

## 2024-10-28 NOTE — ED PROVIDER NOTE - NS ED MD DISPO DIVISION
Pls let pt know this was denied , not sure why , ybe due to age   I will refer her to physiatry-- she had seen dr heladio arreola in the past --last visit in jan -    [facility]

## 2024-10-31 ENCOUNTER — RX RENEWAL (OUTPATIENT)
Age: 54
End: 2024-10-31

## 2024-11-08 ENCOUNTER — RX RENEWAL (OUTPATIENT)
Age: 54
End: 2024-11-08

## 2024-11-25 ENCOUNTER — RX RENEWAL (OUTPATIENT)
Age: 54
End: 2024-11-25

## 2024-12-03 ENCOUNTER — APPOINTMENT (OUTPATIENT)
Dept: CARDIOLOGY | Facility: CLINIC | Age: 54
End: 2024-12-03
Payer: COMMERCIAL

## 2024-12-03 ENCOUNTER — NON-APPOINTMENT (OUTPATIENT)
Age: 54
End: 2024-12-03

## 2024-12-03 VITALS
SYSTOLIC BLOOD PRESSURE: 108 MMHG | HEIGHT: 66 IN | OXYGEN SATURATION: 99 % | DIASTOLIC BLOOD PRESSURE: 58 MMHG | WEIGHT: 278 LBS | HEART RATE: 82 BPM | BODY MASS INDEX: 44.68 KG/M2

## 2024-12-03 DIAGNOSIS — I25.5 ISCHEMIC CARDIOMYOPATHY: ICD-10-CM

## 2024-12-03 DIAGNOSIS — I42.0 ISCHEMIC CARDIOMYOPATHY: ICD-10-CM

## 2024-12-03 DIAGNOSIS — I25.42 CORONARY ARTERY DISSECTION: ICD-10-CM

## 2024-12-03 PROCEDURE — 93000 ELECTROCARDIOGRAM COMPLETE: CPT

## 2024-12-03 PROCEDURE — 99214 OFFICE O/P EST MOD 30 MIN: CPT

## 2024-12-03 PROCEDURE — G2211 COMPLEX E/M VISIT ADD ON: CPT | Mod: NC

## 2024-12-04 NOTE — ED ADULT NURSE NOTE - NS ED NURSE RECORD ANOTHER VITAL SIGN
Naty Monroe is a 69 year old female presenting with BACK PAIN   Medication reconciliation reviewed  Allergies reviewed  Latex Allergy ,     Current Pain Score: 1/10  Pain level at best: 0/10  Pain level at worst: 2/10  Pain level at rest: 1/10  Pain level with activity: 0/10    If previous appointment was procedure, any functional improvement? Yes, 100% ongoing pain relief post caudal.           Yes

## 2024-12-06 LAB
ALBUMIN SERPL ELPH-MCNC: 4.1 G/DL
ALP BLD-CCNC: 68 U/L
ALT SERPL-CCNC: 15 U/L
ANION GAP SERPL CALC-SCNC: 15 MMOL/L
AST SERPL-CCNC: 12 U/L
BILIRUB SERPL-MCNC: 0.5 MG/DL
BUN SERPL-MCNC: 11 MG/DL
CALCIUM SERPL-MCNC: 9.4 MG/DL
CHLORIDE SERPL-SCNC: 103 MMOL/L
CHOLEST SERPL-MCNC: 182 MG/DL
CO2 SERPL-SCNC: 22 MMOL/L
CREAT SERPL-MCNC: 0.94 MG/DL
EGFR: 72 ML/MIN/1.73M2
ESTIMATED AVERAGE GLUCOSE: 120 MG/DL
GLUCOSE SERPL-MCNC: 84 MG/DL
HBA1C MFR BLD HPLC: 5.8 %
HCT VFR BLD CALC: 40.7 %
HDLC SERPL-MCNC: 57 MG/DL
HGB BLD-MCNC: 12.7 G/DL
LDLC SERPL CALC-MCNC: 115 MG/DL
MCHC RBC-ENTMCNC: 27 PG
MCHC RBC-ENTMCNC: 31.2 G/DL
MCV RBC AUTO: 86.6 FL
NONHDLC SERPL-MCNC: 125 MG/DL
PLATELET # BLD AUTO: 291 K/UL
POTASSIUM SERPL-SCNC: 4.7 MMOL/L
PROT SERPL-MCNC: 7.8 G/DL
RBC # BLD: 4.7 M/UL
RBC # FLD: 15.6 %
SODIUM SERPL-SCNC: 140 MMOL/L
TRIGL SERPL-MCNC: 54 MG/DL
TSH SERPL-ACNC: 0.31 UIU/ML
WBC # FLD AUTO: 5.93 K/UL

## 2025-02-05 ENCOUNTER — RX RENEWAL (OUTPATIENT)
Age: 55
End: 2025-02-05

## 2025-04-05 ENCOUNTER — NON-APPOINTMENT (OUTPATIENT)
Age: 55
End: 2025-04-05

## 2025-04-07 ENCOUNTER — NON-APPOINTMENT (OUTPATIENT)
Age: 55
End: 2025-04-07

## 2025-04-07 ENCOUNTER — APPOINTMENT (OUTPATIENT)
Dept: CARDIOLOGY | Facility: CLINIC | Age: 55
End: 2025-04-07
Payer: COMMERCIAL

## 2025-04-07 VITALS
WEIGHT: 268 LBS | OXYGEN SATURATION: 100 % | HEIGHT: 66 IN | HEART RATE: 61 BPM | SYSTOLIC BLOOD PRESSURE: 132 MMHG | BODY MASS INDEX: 43.07 KG/M2 | DIASTOLIC BLOOD PRESSURE: 78 MMHG

## 2025-04-07 VITALS — SYSTOLIC BLOOD PRESSURE: 112 MMHG | DIASTOLIC BLOOD PRESSURE: 70 MMHG

## 2025-04-07 DIAGNOSIS — I25.5 ISCHEMIC CARDIOMYOPATHY: ICD-10-CM

## 2025-04-07 DIAGNOSIS — I42.0 ISCHEMIC CARDIOMYOPATHY: ICD-10-CM

## 2025-04-07 PROCEDURE — 93000 ELECTROCARDIOGRAM COMPLETE: CPT

## 2025-04-07 PROCEDURE — 99214 OFFICE O/P EST MOD 30 MIN: CPT

## 2025-04-07 PROCEDURE — G2211 COMPLEX E/M VISIT ADD ON: CPT | Mod: NC

## 2025-04-07 RX ORDER — MECLIZINE HYDROCHLORIDE 12.5 MG/1
12.5 TABLET ORAL
Qty: 20 | Refills: 0 | Status: DISCONTINUED | COMMUNITY
Start: 2025-02-08

## 2025-04-07 RX ORDER — KETOCONAZOLE 20 MG/G
2 CREAM TOPICAL
Qty: 60 | Refills: 0 | Status: DISCONTINUED | COMMUNITY
Start: 2024-07-09

## 2025-04-07 RX ORDER — TIRZEPATIDE 7.5 MG/.5ML
7.5 INJECTION, SOLUTION SUBCUTANEOUS
Qty: 6 | Refills: 0 | Status: DISCONTINUED | COMMUNITY
Start: 2025-02-08

## 2025-04-07 RX ORDER — CHLORHEXIDINE GLUCONATE 4 %
400 (240 MG) LIQUID (ML) TOPICAL
Qty: 90 | Refills: 0 | Status: ACTIVE | COMMUNITY
Start: 2024-06-01

## 2025-04-07 RX ORDER — TIRZEPATIDE 5 MG/.5ML
5 INJECTION, SOLUTION SUBCUTANEOUS
Qty: 2 | Refills: 0 | Status: DISCONTINUED | COMMUNITY
Start: 2024-11-19

## 2025-04-07 RX ORDER — LEVOTHYROXINE SODIUM 0.12 MG/1
125 TABLET ORAL
Qty: 90 | Refills: 0 | Status: DISCONTINUED | COMMUNITY
Start: 2025-03-12

## 2025-04-07 RX ORDER — ZOLPIDEM TARTRATE 10 MG/1
10 TABLET ORAL
Qty: 30 | Refills: 0 | Status: DISCONTINUED | COMMUNITY
Start: 2024-11-19

## 2025-04-07 RX ORDER — LEVOTHYROXINE SODIUM 0.14 MG/1
137 TABLET ORAL
Qty: 90 | Refills: 0 | Status: DISCONTINUED | COMMUNITY
Start: 2024-12-11

## 2025-04-07 RX ORDER — CLINDAMYCIN PHOSPHATE 20 MG/G
2 CREAM VAGINAL
Qty: 40 | Refills: 0 | Status: DISCONTINUED | COMMUNITY
Start: 2025-02-26

## 2025-04-07 RX ORDER — OMEGA-3-ACID ETHYL ESTERS CAPSULES 1 G/1
1 CAPSULE, LIQUID FILLED ORAL
Qty: 360 | Refills: 0 | Status: DISCONTINUED | COMMUNITY
Start: 2024-11-25

## 2025-06-09 ENCOUNTER — RX RENEWAL (OUTPATIENT)
Age: 55
End: 2025-06-09

## 2025-07-07 ENCOUNTER — RX RENEWAL (OUTPATIENT)
Age: 55
End: 2025-07-07

## 2025-08-19 ENCOUNTER — NON-APPOINTMENT (OUTPATIENT)
Age: 55
End: 2025-08-19

## 2025-08-20 ENCOUNTER — APPOINTMENT (OUTPATIENT)
Dept: CARDIOLOGY | Facility: CLINIC | Age: 55
End: 2025-08-20
Payer: COMMERCIAL

## 2025-08-20 VITALS
OXYGEN SATURATION: 99 % | SYSTOLIC BLOOD PRESSURE: 125 MMHG | HEIGHT: 66 IN | BODY MASS INDEX: 44.36 KG/M2 | HEART RATE: 68 BPM | WEIGHT: 276 LBS | DIASTOLIC BLOOD PRESSURE: 81 MMHG

## 2025-08-20 DIAGNOSIS — I42.0 ISCHEMIC CARDIOMYOPATHY: ICD-10-CM

## 2025-08-20 DIAGNOSIS — I25.5 ISCHEMIC CARDIOMYOPATHY: ICD-10-CM

## 2025-08-20 PROCEDURE — G2211 COMPLEX E/M VISIT ADD ON: CPT | Mod: NC

## 2025-08-20 PROCEDURE — 99214 OFFICE O/P EST MOD 30 MIN: CPT

## 2025-08-20 PROCEDURE — 93000 ELECTROCARDIOGRAM COMPLETE: CPT

## 2025-09-05 ENCOUNTER — RX RENEWAL (OUTPATIENT)
Age: 55
End: 2025-09-05